# Patient Record
Sex: FEMALE | Race: WHITE | Employment: OTHER | URBAN - METROPOLITAN AREA
[De-identification: names, ages, dates, MRNs, and addresses within clinical notes are randomized per-mention and may not be internally consistent; named-entity substitution may affect disease eponyms.]

---

## 2019-12-17 ENCOUNTER — HOSPITAL ENCOUNTER (OUTPATIENT)
Dept: PHYSICAL THERAPY | Age: 84
Discharge: HOME | End: 2019-12-17
Payer: MEDICARE

## 2019-12-17 PROCEDURE — 97162 PT EVAL MOD COMPLEX 30 MIN: CPT

## 2019-12-17 PROCEDURE — 97530 THERAPEUTIC ACTIVITIES: CPT

## 2019-12-17 NOTE — PROGRESS NOTES
PT DAILY TREATMENT NOTE    Patient Name: Demaris Klinefelter  Date:2019  : 1930  [x]  Patient  Verified  Payor: Mitul Vidales / Plan: [place] MEDICARE [place] & B / Product Type: Medicare /    In time:3:05 pm  Out time:3:55 pm   Total Treatment Time (min): 50  Total Timed Codes (min): 20  1:1 Treatment Time ( [address] only): 50   Visit #: 1 of 16    Treatment Area: Other abnormalities of gait and mobility [R26.89]    SUBJECTIVE  Pain Level (0-10 scale):  2  Any medication changes, allergies to medications, adverse drug reactions, diagnosis change, or new procedure performed?: [x] No    [] Yes (see summary sheet for update)  Subjective functional status/changes:   [] No changes reported    Hx Present Illness: C/C of decreased mobility, right hip pain   S/P Right Tibia fracture -DOI in 2019, per pt's son had ORIF 2 days later  8 weeks of inpatient rehab - SNF in [place]   8 weeks of HHPT and [facility] and in home nursing care  D/C from [facility]   DOI ~8/10/19  Per pt's son - has debilitating arthritis affecting right hip and knee  Per pt and pt's son was walking in home and pt had \"soft fall\"  Lives with son    Prior to fall \"I just started using a cane\"  Denies fear of falling   Since surgery as been walking with FW RW  Since accident has been using  RW in home and WC to exit home   Per pt's son at present is \"sponge bathing\" in 1/2 bath downstairs, unable to get in and out of tub  Per pt's son no assistance for toileting or dressing       Pain:  _7__/10 max       __0_/10 min     _2___/10 now    Location: right hip - lateral hip,buttock    bilateral knees      [] Sharp    [x] Dull      [] Burning     []  Aching     [] Throbbing      [] Tingling     [] Other:       []  Constant                   [x] Intermittent        Previous treatment:   HHPT, SNFx 8 weeks     PMHX: PMHx/Surgical Hx:  please see past medical hx form     Social/Recreation/Work: Work Hx: retited  Living Situation: lives with son, 3 story home, has chair lift, 3 steps on first floor  3 steps to enter/exit   Recreational Activities: gardening, shopping, out to eat      Patient Goal(s): \"get back to where I was before the fall\"    Cognition: A & O x 4      OBJECTIVE    Modalities Rationale:    min [] Estim, type/location:                                      []  att     []  unatt     []  w/US     []  w/ice    []  w/heat    min []  Mechanical Traction: type/lbs                   []  pro   []  sup   []  int   []  cont    []  before manual    []  after manual    min []  Ultrasound, settings/location:      min []  Iontophoresis w/ dexamethasone, location:                                               []  take home patch       []  in clinic    min []  Ice     []  Heat    location/position:     min []  Vasopneumatic Device, press/temp:     min []  Other:    [] Skin assessment post-treatment (if applicable):    []  intact    []  redness- no adverse reaction     []redness - adverse reaction:        30 min [x]Eval                  []Re-Eval             With   [] TE   [x] TA- 20 min   [] neuro   [] other: Patient Education: [x] Review HEP    [] Progressed/Changed HEP based on:   [] positioning   [] body mechanics   [] transfers   [] heat/ice application    [x] other: pt and family education regarding exam findings, fall risk, gait, expectations from PT     Other Objective/Functional Measures:    Movement/gait:  Entered clinic in [facility], with FW RW step to gait pattern    Required UE assistance to transfer Right LE, increased time for sit<>supine     Visual Inspection: substantial edema in bilateral LE Right >Left  Extremely dry skin bilateral LE    Palpation: 2+ pitting edema  Palpable adjustments at all joints at right LE with manual and gait                           AROM/PROM Right Left   Hip IR 15 20   Hip ER 10 22   Hip Flex 83 95                    Strength Right Left   Hip Flex 2- 3   Knee Ext 3 4-   Hamstrings 3 3        DF (gross) 3- 3   PF (gross) 2 2 Special Tests Right Left                                   Other Right Left                                       Other Comments: BP: 104/60 mmHg  5 time sit to stand: able to complete 3 times at 30.3 sec from 20 in with bilateral UE  TU.56 sec with FW RW and CGA with step to gait pattern     Sharp right hip pain with weight bearing     Pain Level (0-10 scale) post treatment: 2    ASSESSMENT/Changes in Function:   Pt is an 80 y.o. female with C/C of decreased mobility and right hip pain. Pt is A&O x4. Per pts son is S/P Right Tibia ORIF following fall in August when was walking inside home with FW RW. Pt lives with son and prior to fall was Mod I with all ADLs. Pt presented to PT in [facility] with 2+ pitting edema in bilateral LE. Objective Findings include decreased LE ROM, palpable adjustments in ankle, knee and hip with weight bearing and ROM, decreased strength,decreasedmobility and increased fall risk as evidenced by 5 time sit to stand and TUG. Patient will continue to benefit from skilled PT services to modify and progress therapeutic interventions, address functional mobility deficits, address ROM deficits, address strength deficits, analyze and address soft tissue restrictions, analyze and cue movement patterns, analyze and modify body mechanics/ergonomics, assess and modify postural abnormalities, address imbalance/dizziness and instruct in home and community integration to attain remaining goals. [x]  See Plan of Care  []  See progress note/recertification  []  See Discharge Summary         Progress towards goals / Updated goals:  Short Term Goals: STG- To be accomplished in 6 treatment(s):  1. Pt will be independent with HEP to encourage prophylaxis. Eval:held due to time    2. Pt will be able to perform 5 sit to stands without rest to indicate increased mobility. Eval: able to complete 3    Long Term Goals: LTG- To be accomplished in 16 treatment(s):  1.   Pt will be able to enter/exit clinic with FW RW and walk with step through gait pattern to indicate improved community intergration. Eval:entered with WC, step to gait pattern     2. Pt will improve 5 time sit to  less than 30 sec Mod I to indicate improved mobility and functional strength. Eval:5 time sit to stand: able to complete 3 times at 30.3 sec from 20 in with bilateral UE    3. Pt will improve TUG to 30 sec with FW RW and mod I to indicate improved mobility.   Eval:TU.56 sec with FW RW and CGA with step to gait pattern       PLAN  [x]  Upgrade activities as tolerated     []  Continue plan of care  []  Update interventions per flow sheet       []  Discharge due to:_  []  Other:_      Chuy Eddy, PT, DPT 2019  2:54 PM    Future Appointments   Date Time Provider [place]   2019  3:00 PM Remigio Lamar, PT, DPT [facility]

## 2019-12-18 NOTE — PROGRESS NOTES
[facility] at the [address], [place], [address] [facility]  Phone: [phone]      Fax:  [phone]       Plan of Care/ Statement of Necessity for Physical Therapy Services      Patient name: Dane Diaz Start of Care: 2019   Referral source: Ibrahima Mata MD : 1930    Medical Diagnosis: Other abnormalities of gait and mobility [R26.89]   Onset Date:DOI 2019    Treatment Diagnosis: Gait Dysfunction    Prior Hospitalization: see medical history Provider#: [id number]   Medications: Verified on Patient summary List    Comorbidities: HTN, Allergies, Arthritis    Prior Level of Function: Prior to fall \"I just started using a cane\", Mod I with all ADLs      The Plan of Care and following information is based on the information from the initial evaluation. Assessment/ key information:   Pt is an 80 y.o. female with C/C of decreased mobility and right hip pain. Pt is A&O x4. Per pts son is S/P Right Tibia ORIF following fall in August when was walking inside home with FW RW. Pt lives with son and prior to fall was Mod I with all ADLs. Pt presented to PT in [facility] with 2+ pitting edema in bilateral LE. Objective Findings include decreased LE ROM, palpable adjustments in ankle, knee and hip with weight bearing and ROM, decreased strength,decreasedmobility and increased fall risk as evidenced by 5 time sit to stand and TUG. Suspect substantial OA changes in right hip and knee contributing to gait and tolerance to mobility. Evaluation Complexity History HIGH Complexity :3+ comorbidities / personal factors will impact the outcome/ POC ; Examination HIGH Complexity : 4+ Standardized tests and measures addressing body structure, function, activity limitation and / or participation in recreation  ;Presentation MEDIUM Complexity : Evolving with changing characteristics  ; Clinical Decision Making MEDIUM Complexity : FOTO score of 26-74  Overall Complexity Rating: MEDIUM  Problem List: pain affecting function, decrease ROM, decrease strength, edema affecting function, impaired gait/ balance, decrease ADL/ functional abilitiies, decrease activity tolerance, decrease flexibility/ joint mobility and decrease transfer abilities   Treatment Plan may include any combination of the following: Therapeutic exercise, Therapeutic activities, Neuromuscular re-education, Physical agent/modality, Gait/balance training, Manual therapy, Patient education, Self Care training, Functional mobility training and Home safety training  Patient / Family readiness to learn indicated by: asking questions, trying to perform skills and interest  Persons(s) to be included in education: patient (P) and family support person (FSP);list pt's son  Barriers to Learning/Limitations: yes;  other premorbid medical condition  Patient Goal (s): get back to where I was before the [address] [place] [address]  Patient Self Reported Health Status: fair  Rehabilitation Potential: fair-good    Short Term Goals: STG- To be accomplished in 6 treatment(s):  1. Pt will be independent with HEP to encourage prophylaxis. Eval:held due to time     2. Pt will be able to perform 5 sit to stands without rest to indicate increased mobility. Eval: able to complete 3     Long Term Goals: LTG- To be accomplished in 16 treatment(s):  1. Pt will be able to enter/exit clinic with FW RW and walk with step through gait pattern to indicate improved community intergration. Eval:entered with WC, step to gait pattern      2. Pt will improve 5 time sit to  less than 30 sec Mod I to indicate improved mobility and functional strength. Eval:5 time sit to stand: able to complete 3 times at 30.3 sec from 20 in with bilateral UE     3. Pt will improve TUG to 30 sec with FW RW and mod I to indicate improved mobility.   Eval:TU.56 sec with FW RW and CGA with step to gait pattern     Frequency / Duration: Patient to be seen 16 treatments. Patient/ Caregiver education and instruction: Diagnosis, prognosis, self care and activity modification   [x]  Plan of care has been reviewed with PTA    Certification Period: 12/17/19 - 3/16/19  Varun Norman PT, DPT 12/17/2019 7:26 PM  _____________________________________________________________________  I certify that the above Therapy Services are being furnished while the patient is under my care. I agree with the treatment plan and certify that this therapy is necessary.     Physician's Signature:____________Date:_________TIME:________    [employer], Date and Time must be completed for valid certification **    Please sign and return to [facility] at the [address], [place], [address] [facility]       Phone: [phone]      Fax:  [phone]

## 2019-12-19 ENCOUNTER — HOSPITAL ENCOUNTER (OUTPATIENT)
Dept: PHYSICAL THERAPY | Age: 84
Discharge: HOME | End: 2019-12-19
Payer: MEDICARE

## 2019-12-19 PROCEDURE — 97110 THERAPEUTIC EXERCISES: CPT

## 2019-12-19 NOTE — PROGRESS NOTES
PT DAILY TREATMENT NOTE    Patient Name: Ebonie Odell  Date:2019  : 1930  [x]  Patient  Verified  Payor: Hu Hind / Plan: [place] MEDICARE PART A & B / Product Type: Medicare /    In time:3:30  Out time:4:25  Total Treatment Time (min): 55  Total Timed Codes (min): 55  1:1 Treatment Time ( [address] only): 55   Visit #: 2 of 16    Treatment Area: Other abnormalities of gait and mobility [R26.89]    SUBJECTIVE  Pain Level (0-10 scale): 5-6  Any medication changes, allergies to medications, adverse drug reactions, diagnosis change, or new procedure performed?: [x] No    [] Yes (see summary sheet for update)  Subjective functional status/changes:   [] No changes reported  \"My knees are stiff. \"    OBJECTIVE      55 min Therapeutic Exercise:  [x] See flow sheet :   Rationale: increase ROM and increase strength to improve the patients ability to perform daily activities with decreased pain and symptom levels          With   [] TE   [] TA   [] neuro   [] other: Patient Education: [x] Review HEP    [] Progressed/Changed HEP based on:   [] positioning   [] body mechanics   [] transfers   [] heat/ice application    [] other:      Other Objective/Functional Measures: Audible hip clunking in standing on right     Pain Level (0-10 scale) post treatment: 7    ASSESSMENT/Changes in Function: Pt tolerated exercises fairly with ability to complete some standing exercises today however challenged with stabilizing on right LE with right lateral lean and increased forward flexion. Updated HEP today to include new exercises with son reporting trying to find compression stockings that fit since the medium size from [facility] was too small. Pt reporting increased right glut soreness post session. SBA for transfers with mod A for LE for supine<>sit.      Patient will continue to benefit from skilled PT services to modify and progress therapeutic interventions, address functional mobility deficits, address strength deficits, analyze and cue movement patterns, analyze and modify body mechanics/ergonomics, assess and modify postural abnormalities, address imbalance/dizziness and instruct in home and community integration to attain remaining goals. []  See Plan of Care  []  See progress note/recertification  []  See Discharge Summary         Progress towards goals / Updated goals:  Short Term Goals: STG- To be accomplished in 6 treatment(s):  1.  Pt will be independent with HEP to encourage prophylaxis. Eval:held due to time  Current: HEP dispesned today      2. Pt will be able to perform 5 sit to stands without rest to indicate increased mobility. Eval: able to complete 3  Current: able to complete 5 from 20 in      Long Term Goals: LTG- To be accomplished in 16 treatment(s):  1.  Pt will be able to enter/exit clinic with FW RW and walk with step through gait pattern to indicate improved community intergration. Eval:entered with WC, step to gait pattern      2.  Pt will improve 5 time sit to  less than 30 sec Mod I to indicate improved mobility and functional strength. Eval:5 time sit to stand: able to complete 3 times at 30.3 sec from 20 in with bilateral UE     3.  Pt will improve TUG to 30 sec with FW RW and mod I to indicate improved mobility.   Eval:TU.56 sec with FW RW and CGA with step to gait pattern        PLAN  [x]  Upgrade activities as tolerated     [x]  Continue plan of care  []  Update interventions per flow sheet       []  Discharge due to:_  []  Other:_      Almita Johnson 2019  3:37 PM    Future Appointments   Date Time Provider [place]   2019 10:15 AM Lynn Ty PT, DPT [facility]   1/3/2020  8:45 AM Janny Overton [facility]   2020  9:30 AM Lynn Ty PT, DPT [facility]   2020 11:30 AM Lynn Ty PT, DPT [facility]   2020 11:15 AM Lynn Ty PT, DPT [facility]   2020 10:45 AM Lynn Ty PT, DPT [facility]   2020 12:00 PM Volodymyr Sumi [facility]   1/17/2020 11:45 AM Curtis Grijalva PT, DPT [facility]   1/21/2020 10:45 AM Curtis Grijalva PT, DPT [facility]   1/23/2020 12:00 PM Winston Overton [facility]   1/24/2020 12:30 PM Curtis Grijalva PT, DPT [facility]   1/28/2020 10:45 AM Curtis Grijalva PT, DPT [facility]   1/30/2020 11:15 AM Curtis Grijalva PT, DPT [facility]   1/31/2020 11:45 AM Curtis Grijalva PT, DPT [facility]

## 2019-12-23 ENCOUNTER — HOSPITAL ENCOUNTER (OUTPATIENT)
Dept: PHYSICAL THERAPY | Age: 84
Discharge: HOME | End: 2019-12-23
Payer: MEDICARE

## 2019-12-23 PROCEDURE — 97110 THERAPEUTIC EXERCISES: CPT

## 2019-12-23 NOTE — PROGRESS NOTES
PT DAILY TREATMENT NOTE    Patient Name: Stuart Varghese  Date:2019  : 1930  [x]  Patient  Verified  Payor: Gerhardt Hinders / Plan: [place] MEDICARE PART A & B / Product Type: Medicare /    In time:10:19 am  Out time:11:00 am  Total Treatment Time (min): 41  Total Timed Codes (min): 41  1:1 Treatment Time ( [address] only): 41   Visit #: 3 of 16    Treatment Area: Other abnormalities of gait and mobility [R26.89]    SUBJECTIVE  Pain Level (0-10 scale): 5  Any medication changes, allergies to medications, adverse drug reactions, diagnosis change, or new procedure performed?: [x] No    [] Yes (see summary sheet for update)  Subjective functional status/changes:   [] No changes reported  \"This right hip hurts me a fair bit. \"  Reports non-compliance with HEP over weekend.     OBJECTIVE    Modalities Rationale:      min [] Estim, type/location:                                      []  att     []  unatt     []  w/US     []  w/ice    []  w/heat    min []  Mechanical Traction: type/lbs                   []  pro   []  sup   []  int   []  cont    []  before manual    []  after manual    min []  Ultrasound, settings/location:      min []  Iontophoresis w/ dexamethasone, location:                                               []  take home patch       []  in clinic    min []  Ice     []  Heat    location/position:     min []  Vasopneumatic Device, press/temp:     min []  Other:    [] Skin assessment post-treatment (if applicable):    []  intact    []  redness- no adverse reaction     []redness - adverse reaction:          41 min Therapeutic Exercise:  [x] See flow sheet :   Rationale: increase ROM, increase strength, improve coordination, improve balance and increase proprioception to improve the patients ability to perform daily activities with decreased pain and symptom levels          With   [] TE   [] TA   [] neuro   [] other: Patient Education: [x] Review HEP    [] Progressed/Changed HEP based on:   [] positioning   [] body mechanics   [] transfers   [] heat/ice application    [] other:      Other Objective/Functional Measures:   Sit to stand from 20 in surface  Utilized manual cues and stabilization for sit to stand to bear weight through right LE and decrease toe out    Audible, palpable re-adjustments at Right LE from hip to ankle with AROM and weight bearing     Pain Level (0-10 scale) post treatment: 7    ASSESSMENT/Changes in Function:   Increased pain with weight bearing and AROM of right LE. Able to decrease with cues to weight bear. Substantial toe out bilaterally. Unable to achieve stance or swing on either LE, increased use of UE with gait. Discussed with pt and pts mother trying to minimize toe out with resting, walking and supine. Patient will continue to benefit from skilled PT services to modify and progress therapeutic interventions, address functional mobility deficits, address ROM deficits, address strength deficits, analyze and address soft tissue restrictions, analyze and cue movement patterns, analyze and modify body mechanics/ergonomics, assess and modify postural abnormalities, address imbalance/dizziness and instruct in home and community integration to attain remaining goals. []  See Plan of Care  []  See progress note/recertification  []  See Discharge Summary         Progress towards goals / Updated goals:  Short Term Goals: STG- To be accomplished in 6 treatment(s):  1.  Pt will be independent with HEP to encourage prophylaxis. Eval:held due to time  Current: Reports non compliance since last appointment      2. Pt will be able to perform 5 sit to stands without rest to indicate increased mobility.    Eval: able to complete 3  Current: Progressing 12/23/19 able to complete 5 from 20 in with increased time       Long Term Goals: LTG- To be accomplished in 16 treatment(s):  1.  Pt will be able to enter/exit clinic with FW RW and walk with step through gait pattern to indicate improved community intergration. Eval:entered with WC, step to gait pattern      2.  Pt will improve 5 time sit to  less than 30 sec Mod I to indicate improved mobility and functional strength. Eval:5 time sit to stand: able to complete 3 times at 30.3 sec from 20 in with bilateral UE     3.  Pt will improve TUG to 30 sec with FW RW and mod I to indicate improved mobility.   Eval:TU.56 sec with FW RW and CGA with step to gait pattern           PLAN  [x]  Upgrade activities as tolerated     []  Continue plan of care  []  Update interventions per flow sheet       []  Discharge due to:_  []  Other:_      Charity Barrios PT, DPT 2019  10:27 AM    Future Appointments   Date Time Provider [place]   1/3/2020  8:45 AM Crista Overton [facility]   2020  9:30 AM Barnet Tang, PT, DPT [facility]   2020 11:30 AM Barnet Tang, PT, DPT [facility]   2020 11:15 AM Barnet Tang, PT, DPT [facility]   2020 10:45 AM Barnet Tang, PT, DPT [facility]   2020 12:00 PM Crista Overton [facility]   2020 11:45 AM Barnet Tang, PT, DPT [facility]   2020 10:45 AM Barnet Tang, PT, DPT [facility]   2020 12:00 PM Crista Overton [facility]   2020 12:30 PM Barmaged Tang, PT, DPT [facility]   2020 10:45 AM Barnet Tang, PT, DPT [facility]   2020 11:15 AM Barnet Tang, PT, DPT [facility]   2020 11:45 AM [facility]

## 2020-01-03 ENCOUNTER — HOSPITAL ENCOUNTER (OUTPATIENT)
Dept: PHYSICAL THERAPY | Age: 85
Discharge: HOME | End: 2020-01-03
Payer: MEDICARE

## 2020-01-03 PROCEDURE — 97110 THERAPEUTIC EXERCISES: CPT

## 2020-01-03 NOTE — PROGRESS NOTES
PT DAILY TREATMENT NOTE    Patient Name: Krys Mackay  Date:1/3/2020  : 1930  [x]  Patient  Verified  Payor: Linder Cheadle / Plan: [place] / Product Type: Medicare /    In time:8:55  Out time:9:55  Total Treatment Time (min): 60  Total Timed Codes (min): 60  1:1 Treatment Time ( [address] only): 45   Visit #: 4 of 16    Treatment Area: Other abnormalities of gait and mobility [R26.89]    SUBJECTIVE  Pain Level (0-10 scale): 5  Any medication changes, allergies to medications, adverse drug reactions, diagnosis change, or new procedure performed?: [x] No    [] Yes (see summary sheet for update)  Subjective functional status/changes:   [] No changes reported  \"My knees and hips are sore this morning. \"    OBJECTIVE      60 min Therapeutic Exercise:  [x] See flow sheet :   Rationale: increase ROM and increase strength to improve the patients ability to perform daily activities with decreased pain and symptom levels    With   [] TE   [] TA   [] neuro   [] other: Patient Education: [x] Review HEP    [] Progressed/Changed HEP based on:   [] positioning   [] body mechanics   [] transfers   [] heat/ice application    [] other:      Other Objective/Functional Measures:   Challenged with putting weight through right LE with standing exercises despite tactile cues     Pain Level (0-10 scale) post treatment: 5    ASSESSMENT/Changes in Function: Pt continues to c/o right LE pain with audible repositioning in standing. Pt very hesistant to place weight through right LE with exercises needing consistent tactile cues from therapist. Added SB rollouts and forward reaching to help with anterior weight shift with sit to stands.      Patient will continue to benefit from skilled PT services to modify and progress therapeutic interventions, address functional mobility deficits, address strength deficits, analyze and cue movement patterns, analyze and modify body mechanics/ergonomics, assess and modify postural abnormalities, address imbalance/dizziness and instruct in home and community integration to attain remaining goals. []  See Plan of Care  []  See progress note/recertification  []  See Discharge Summary         Progress towards goals / Updated goals:  Short Term Goals: STG- To be accomplished in 6 treatment(s):  1.  Pt will be independent with HEP to encourage prophylaxis. Eval:held due to time  Current: Reports non compliance despite education on importance      2. Pt will be able to perform 5 sit to stands without rest to indicate increased mobility. Eval: able to complete 3  Current: Progressing 19 able to complete 5 from 20 in with increased time       Long Term Goals: LTG- To be accomplished in 16 treatment(s):  1.  Pt will be able to enter/exit clinic with FW RW and walk with step through gait pattern to indicate improved community intergration. Eval:entered with WC, step to gait pattern   Current: walked with RW from stepper to parallel bars with cues to lift left LE     2.  Pt will improve 5 time sit to  less than 30 sec Mod I to indicate improved mobility and functional strength. Eval:5 time sit to stand: able to complete 3 times at 30.3 sec from 20 in with bilateral UE  Current:      3.  Pt will improve TUG to 30 sec with FW RW and mod I to indicate improved mobility.   Eval:TU.56 sec with FW RW and CGA with step to gait pattern        PLAN  [x]  Upgrade activities as tolerated     [x]  Continue plan of care  []  Update interventions per flow sheet       []  Discharge due to:_  []  Other:_      Shahab Girard 1/3/2020  8:57 AM    Future Appointments   Date Time Provider Gayle Valencia   2020  9:30 AM Amanda Cruz, PT, DPT [facility]   2020 11:30 AM Amanda Cruz, PT, DPT [facility]   2020 11:15 AM Amanda Cruz, PT, DPT [facility]   2020 10:45 AM Amanda Cruz, PT, DPT [facility]   2020 12:00 PM Rosendo Overton [facility]   2020 11:45 AM Kade Villanueva PT, DPT [facility]   1/21/2020 10:45 AM Kade Villanueva PT, DPT [facility]   1/23/2020 12:00 PM Remesic, Tula Riedel [facility]   1/24/2020 12:30 PM Kade Villanueva PT, DPT [facility]   1/28/2020 10:45 AM Kade Villanueva PT, DPT [facility]   1/30/2020 11:15 AM Kade Villanueva PT, DPT [facility]   1/31/2020 11:45 AM Kade Villanueva PT, DPT [facility]

## 2020-01-06 ENCOUNTER — HOSPITAL ENCOUNTER (OUTPATIENT)
Dept: PHYSICAL THERAPY | Age: 85
Discharge: HOME | End: 2020-01-06
Payer: MEDICARE

## 2020-01-06 PROCEDURE — 97110 THERAPEUTIC EXERCISES: CPT

## 2020-01-06 PROCEDURE — 97530 THERAPEUTIC ACTIVITIES: CPT

## 2020-01-07 ENCOUNTER — HOSPITAL ENCOUNTER (OUTPATIENT)
Dept: PHYSICAL THERAPY | Age: 85
Discharge: HOME | End: 2020-01-07
Payer: MEDICARE

## 2020-01-07 PROCEDURE — 97530 THERAPEUTIC ACTIVITIES: CPT

## 2020-01-07 PROCEDURE — 97110 THERAPEUTIC EXERCISES: CPT

## 2020-01-07 NOTE — PROGRESS NOTES
PT DAILY TREATMENT NOTE    Patient Name: Marisela Mcdaniels  Date:2020  : 1930  [x]  Patient  Verified  Payor: Ronaldo Butler / Plan: [place] MEDICARE PART A & B / Product Type: Medicare /    In time:11:32 am  Out time:12:27 pm   Total Treatment Time (min): 55  Total Timed Codes (min): 55  1:1 Treatment Time ( [address] only): 47   Visit #: 6 of 16    Treatment Area: Other abnormalities of gait and mobility [R26.89]    SUBJECTIVE  Pain Level (0-10 scale): 0  Any medication changes, allergies to medications, adverse drug reactions, diagnosis change, or new procedure performed?: [x] No    [] Yes (see summary sheet for update)  Subjective functional status/changes:   [] No changes reported  \"I am doing ok. \"    OBJECTIVE    Modalities Rationale:       min [] Estim, type/location:                                      []  att     []  unatt     []  w/US     []  w/ice    []  w/heat    min []  Mechanical Traction: type/lbs                   []  pro   []  sup   []  int   []  cont    []  before manual    []  after manual    min []  Ultrasound, settings/location:      min []  Iontophoresis w/ dexamethasone, location:                                               []  take home patch       []  in clinic    min []  Ice     []  Heat    location/position:     min []  Vasopneumatic Device, press/temp:     min []  Other:    [] Skin assessment post-treatment (if applicable):    []  intact    []  redness- no adverse reaction     []redness  adverse reaction:          45 min Therapeutic Exercise:  [x] See flow sheet :   Rationale: increase ROM, increase strength, improve coordination, improve balance and increase proprioception to improve the patients ability to perform daily activities with decreased pain and symptom levels    10 min Therapeutic Activity:  [x]  See flow sheet :   Rationale: increase ROM, increase strength, improve coordination, improve balance and increase proprioception  to improve the patients ability to perform daily activities with decreased pain and symptom levels            With   [] TE   [] TA   [] neuro   [] other: Patient Education: [x] Review HEP    [] Progressed/Changed HEP based on:   [] positioning   [] body mechanics   [] transfers   [] heat/ice application    [] other:      Other Objective/Functional Measures:   Marked right LE valgus    Very challenged with weight bearing through right LE with standing, gait and even through buttock with sitting  Good quad contraction with standing TKE with maximal cues. extreme    Pain Level (0-10 scale) post treatment: 8    ASSESSMENT/Changes in Function:   Continued audible repositioning of right LE from ankle to hip with all activities. Pt very hesistant to place weight through right LE with exercises needing consistent tactile cues from therapist. Increased pain in Right LE following activities. Atone time unable to advance left LE due to pain and lack of weight bearing through right LE. Patient will continue to benefit from skilled PT services to modify and progress therapeutic interventions, address functional mobility deficits, address ROM deficits, address strength deficits, analyze and address soft tissue restrictions, analyze and cue movement patterns, analyze and modify body mechanics/ergonomics, assess and modify postural abnormalities, address imbalance/dizziness and instruct in home and community integration to attain remaining goals. []  See Plan of Care  []  See progress note/recertification  []  See Discharge Summary         Progress towards goals / Updated goals:  Short Term Goals: STG- To be accomplished in 6 treatment(s):  1.  Pt will be independent with HEP to encourage prophylaxis. Eval:held due to time  Current: Pt's son reports focus has been on standing and moving at home     2. Pt will be able to perform 5 sit to stands without rest to indicate increased mobility.    Eval: able to complete 3  Current: Progressing 1/6/20 able to complete 7 during treatment session      Long Term Goals: LTG- To be accomplished in 16 treatment(s):  1.  Pt will be able to enter/exit clinic with FW RW and walk with step through gait pattern to indicate improved community intergration. Eval:entered with WC, step to gait pattern   Current: walked with RW from stepper to parallel bars with cues to lift left LE     2.  Pt will improve 5 time sit to  less than 30 sec Mod I to indicate improved mobility and functional strength. Eval:5 time sit to stand: able to complete 3 times at 30.3 sec from 20 in with bilateral UE  Current: very challenged with sit to stands      3.  Pt will improve TUG to 30 sec with FW RW and mod I to indicate improved mobility.   Eval:TU.56 sec with FW RW and CGA with step to gait pattern   Current: Progressing 20 walking 10-15 yd in clinic with very slow gait pattern     PLAN  [x]  Upgrade activities as tolerated     []  Continue plan of care  []  Update interventions per flow sheet       []  Discharge due to:_  []  Other:_      Yolande Oneal PT, DPT 2020  11:42 AM    Future Appointments   Date Time Provider Gayle Valencia   2020 11:15 AM Najma Patel PT, DPT [facility]   2020 10:45 AM Najma Patel PT, DPT [facility]   2020 12:00 PM Jenise Overton [facility]   2020 11:45 AM Najma Patel PT, DPT [facility]   2020 10:45 AM Najma Patel PT, DPT [facility]   2020 12:00 PM Jenise Overton [facility]   2020 12:30 PM Najma Patel PT, DPT [facility]   2020 10:45 AM Najma Patel PT, DPT [facility]   2020 11:15 AM Naj[facility] Patel PT, [facility]   2020 11:00 AM Jenise Overton [facility]

## 2020-01-09 ENCOUNTER — HOSPITAL ENCOUNTER (OUTPATIENT)
Dept: PHYSICAL THERAPY | Age: 85
Discharge: HOME | End: 2020-01-09
Payer: MEDICARE

## 2020-01-09 PROCEDURE — 97530 THERAPEUTIC ACTIVITIES: CPT

## 2020-01-09 PROCEDURE — 97110 THERAPEUTIC EXERCISES: CPT

## 2020-01-09 NOTE — PROGRESS NOTES
PT DAILY TREATMENT NOTE    Patient Name: Mecca Tucker  Date:2020  : 1930  [x]  Patient  Verified  Payor: Shawnee Barron / Plan: [place] MEDICARE PART A & B / Product Type: Medicare /    In time:11:16 am  Out time:12:15 pm   Total Treatment Time (min): 59  Total Timed Codes (min): 59  1:1 Treatment Time ( [address] only): 45   Visit #: 7 of 16    Treatment Area: Other abnormalities of gait and mobility [R26.89]    SUBJECTIVE  Pain Level (0-10 scale): 0  Any medication changes, allergies to medications, adverse drug reactions, diagnosis change, or new procedure performed?: [x] No    [] Yes (see summary sheet for update)  Subjective functional status/changes:   [] No changes reported  \"I am doing ok. \"  Per pt's son - working on walking at home and pt very reluctant to get up and walk. Pain in right hip with PT    OBJECTIVE    Modalities Rationale:    min [] Estim, type/location:                                      []  att     []  unatt     []  w/US     []  w/ice    []  w/heat    min []  Mechanical Traction: type/lbs                   []  pro   []  sup   []  int   []  cont    []  before manual    []  after manual    min []  Ultrasound, settings/location:      min []  Iontophoresis w/ dexamethasone, location:                                               []  take home patch       []  in clinic    min []  Ice     []  Heat    location/position:     min []  Vasopneumatic Device, press/temp:     min []  Other:    [] Skin assessment post-treatment (if applicable):    []  intact    []  redness- no adverse reaction     []redness  adverse reaction:        45 min Therapeutic Exercise:  [x]? See flow sheet :   Rationale: increase ROM, increase strength, improve coordination, improve balance and increase proprioception to improve the patients ability to perform daily activities with decreased pain and symptom levels     14 min Therapeutic Activity:  [x]?   See flow sheet :   Rationale: increase ROM, increase strength, improve coordination, improve balance and increase proprioception  to improve the patients ability to perform daily activities with decreased pain and symptom levels             With   [] TE   [] TA   [] neuro   [] other: Patient Education: [x] Review HEP    [] Progressed/Changed HEP based on:   [] positioning   [] body mechanics   [] transfers   [] heat/ice application    [] other:      Other Objective/Functional Measures:   Sit to stand from [facility] to allow elevated rail for hands, increased use of UE with standing and sitting but emphasized weight bearing through bilateral LE     Marked Right LE valgus    Very apprehensive with WB through Right LE    Frequent, excessive bilateral LE toe out and ER    Pain Level (0-10 scale) post treatment: 8    ASSESSMENT/Changes in Function:   Pt was able to complete 9 sit to stands from [facility]. While gait is slow pt did demonstrated improved step length and quality with walking with RW. Almost refuses to bear weight through right L, needing consistent tactile cues from therapist. Increased pain in Right LE following activities. . Continued audible repositioning of right LE from ankle to hip with all activities    Patient will continue to benefit from skilled PT services to modify and progress therapeutic interventions, address functional mobility deficits, address ROM deficits, address strength deficits, analyze and address soft tissue restrictions, analyze and cue movement patterns, analyze and modify body mechanics/ergonomics, assess and modify postural abnormalities, address imbalance/dizziness and instruct in home and community integration to attain remaining goals. []  See Plan of Care  []  See progress note/recertification  []  See Discharge Summary         Progress towards goals / Updated goals:  Short Term Goals: STG- To be accomplished in 6 treatment(s):  1.  Pt will be independent with HEP to encourage prophylaxis.   Eval:held due to time  Current: Pt's son reports focus has been on standing and moving at home     2. Pt will be able to perform 5 sit to stands without rest to indicate increased mobility. Eval: able to complete 3  Current: Progressing 20 able to complete 9  This session, 5 slowly without prolonged sitting rest     Long Term Goals: LTG- To be accomplished in 16 treatment(s):  1.  Pt will be able to enter/exit clinic with FW RW and walk with step through gait pattern to indicate improved community intergration. Eval:entered with WC, step to gait pattern   Current: walked with RW from stepper to parallel bars with cues to lift left LE     2.  Pt will improve 5 time sit to  less than 30 sec Mod I to indicate improved mobility and functional strength. Eval:5 time sit to stand: able to complete 3 times at 30.3 sec from 20 in with bilateral UE  Current: very challenged with sit to stands      3.  Pt will improve TUG to 30 sec with FW RW and mod I to indicate improved mobility.   Eval:TU.56 sec with FW RW and CGA with step to gait pattern   Current: Progressing 20 walking 10-15 yd in clinic with very slow gait pattern     PLAN  [x]  Upgrade activities as tolerated     []  Continue plan of care  []  Update interventions per flow sheet       []  Discharge due to:_  []  Other:_      Ha Bush PT, DPT 2020  11:21 AM    Future Appointments   Date Time Provider Gayle Valencia   2020 10:45 AM Antonio oWng PT, DPT [facility]   2020 12:00 PM Kike Overton [facility]   2020 11:45 AM Antonio Wong PT, DPT [facility]   2020 10:45 AM Antonio Wong PT DPT [facility]   2020 12:00 PM Kike Overton [facility]   2020 12:30 PM Antonio Wong PT, DPT [facility]   2020 10:45 AM Antonio Wong PT, DPT [facility]   2020 11:15 AM Antonio Wong PT, JUSTINT [facility]   2020 11:00 AM Kike OvertonHPTBMELANY [facility]

## 2020-01-14 ENCOUNTER — HOSPITAL ENCOUNTER (OUTPATIENT)
Dept: PHYSICAL THERAPY | Age: 85
Discharge: HOME | End: 2020-01-14
Payer: MEDICARE

## 2020-01-14 PROCEDURE — 97530 THERAPEUTIC ACTIVITIES: CPT

## 2020-01-14 PROCEDURE — 97110 THERAPEUTIC EXERCISES: CPT

## 2020-01-14 NOTE — PROGRESS NOTES
[facility] [address] [facility], [place], [address] [facility]  Phone: [phone]      Fax:  [phone]    Progress Note  Patient name: Iliana Riggs Start of Care: 2019   Referral source: Bianca Henriquez MD : 1930               Medical Diagnosis: Other abnormalities of gait and mobility [R26.89]    Onset Date:               Treatment Diagnosis: Gait Dysfunction    Prior Hospitalization: see medical history Provider#: [id number]   Medications: Verified on Patient summary List    Comorbidities: HTN, Allergies, Arthritis    Prior Level of Function: Prior to fall \"I just started using a cane\", Mod I with all ADLs    Visits from Start of Care: 8    Missed Visits: 0    Progress Towards Goals:   Short Term Goals: STG- To be accomplished in 6 treatment(s):  1.  Pt will be independent with HEP to encourage prophylaxis. Eval:held due to time  Current: Pt's son reports focus has been on standing and moving at home     2. Pt will be able to perform 5 sit to stands without rest to indicate increased mobility. Eval: able to complete 3  Current: Progressing 20 able to complete 10 this session, Able to perform 5 sit to stands without rest from  [address]- To be accomplished in 16 treatment(s):  1.  Pt will be able to enter/exit clinic with FW RW and walk with step through gait pattern to indicate improved community intergration. Eval:entered with WC, step to gait pattern   Current: walked with RW from stepper to parallel bars with cues to lift left LE     2.  Pt will improve 5 time sit to  less than 30 sec Mod I to indicate improved mobility and functional strength.   Eval:5 time sit to stand: able to complete 3 times at 30.3 sec from 20 in with bilateral UE  Current: Progressing 20 Able to perform 5 sit to stands without rest from [facility]     3.  Pt will improve TUG to 30 sec with FW RW and mod I to indicate improved mobility. Eval:TU.56 sec with FW RW and CGA with step to gait pattern   Current: Progressing TUG: trial 1: 104 sec with RW, SBA and step through gait, took 20 sec to stand  Trial 2: 96 sec with RW and SBA and step through, took 19 sec to stand    Key Functional Changes:   Able to perform 5 sit to stands without rest from [facility]  TUG: trial 1: 104 sec with RW, SBA and step through gait, took 20 sec to stand  Trial 2: 96 sec with RW and SBA and step through, took 19 sec to stand    Pt has been seen for 8 visits including initial evaluation C/C of decreased mobility and right hip pain. Pt is A&O x4. Per pts son is S/P Right Tibia ORIF following fall in August when was walking inside home with FW RW.  Pt is very limited in participation in PT and tolerance to activities to to constant and painful realignment of right LE from hip to ankle with AROM of right LE and weight bearing. Painful at right hip with walking >2-3 steps. Increased time with transfers and increased use of L-spine. Right LE rests in ER and knee flexion. Apprehensive with weight bearing through right LE.    Please advise as to POC with Right hip - question if femoral head is positioned in acetabulum at rest.     Updated Goals: to be achieved in 8 treatments:   Same as above     ASSESSMENT/RECOMMENDATIONS:  [x]Continue therapy per initial plan/protocol at a frequency of  8 treatments  []Continue therapy with the following recommended changes:_____________________      _____________________________________________________________________  []Discontinue therapy progressing towards or have reached established goals  []Discontinue therapy due to lack of appreciable progress towards goals  []Discontinue therapy due to lack of attendance or compliance  []Await Physician's recommendations/decisions regarding therapy  []Other:________________________________________________________________    Thank you for this referral.   Francheska Adams PT, JUSTINT 1/14/2020 2:08 PM  NOTE TO PHYSICIAN:  PLEASE COMPLETE THE ORDERS BELOW AND   FAX TO [facility]: [phone]  If you are unable to process this request in 24 hours please contact our office: [phone]        []  I have read the above report and request that my patient continue as recommended. []  I have read the above report and request that my patient continue therapy with the following changes/special instructions:________________________________________  []I have read the above report and request that my patient be discharged from therapy.

## 2020-01-14 NOTE — PROGRESS NOTES
PT DAILY TREATMENT NOTE    Patient Name: Jazmin Mccollum  Date:2020  : 1930  [x]  Patient  Verified  Payor: Ana Soliman / Plan: [place] MED[place] & B / Product Type: Medicare /    In time:10:49 am  Out time:11:45 am  Total Treatment Time (min): 56  Total Timed Codes (min): 56  1:1 Treatment Time ( [address] only): 46   Visit #: 8 of 16    Treatment Area: Other abnormalities of gait and mobility [R26.89]    SUBJECTIVE  Pain Level (0-10 scale): 0  Any medication changes, allergies to medications, adverse drug reactions, diagnosis change, or new procedure performed?: [x] No    [] Yes (see summary sheet for update)  Subjective functional status/changes:   [] No changes reported  \"I am ok\"  Pt's son reports that has MD appointment at 1:10 today. This weekend pt was very reluctant to walk. OBJECTIVE    Modalities Rationale:      min [] Estim, type/location:                                      []  att     []  unatt     []  w/US     []  w/ice    []  w/heat    min []  Mechanical Traction: type/lbs                   []  pro   []  sup   []  int   []  cont    []  before manual    []  after manual    min []  Ultrasound, settings/location:      min []  Iontophoresis w/ dexamethasone, location:                                               []  take home patch       []  in clinic    min []  Ice     []  Heat    location/position:     min []  Vasopneumatic Device, press/temp:     min []  Other:    [] Skin assessment post-treatment (if applicable):    []  intact    []  redness- no adverse reaction     []redness  adverse reaction:        26 min Therapeutic Exercise:  [x]? ? See flow sheet :   Rationale: increase ROM, increase strength, improve coordination, improve balance and increase proprioception to improve the patients ability to perform daily activities with decreased pain and symptom levels     31 min Therapeutic Activity:  [x]? ?  See flow sheet :   Rationale: increase ROM, increase strength, improve coordination, improve balance and increase proprioception  to improve the patients ability to perform daily activities with decreased pain and symptom levels          With   [] TE   [] TA   [] neuro   [] other: Patient Education: [x] Review HEP    [] Progressed/Changed HEP based on:   [] positioning   [] body mechanics   [] transfers   [] heat/ice application    [] other:      Other Objective/Functional Measures:   Able to perform 5 sit to stands without rest from [facility]  TUG: trial 1: 104 sec with RW, SBA and step through gait, took 20 sec to stand  Trial 2: 96 sec with RW and SBA and step through, took 19 sec to stand    Pain Level (0-10 scale) post treatment: 8    ASSESSMENT/Changes in Function:   Pt has been seen for 8 visits including initial evaluation C/C of decreased mobility and right hip pain. Pt is A&O x4. Per pts son is S/P Right Tibia ORIF following fall in August when was walking inside home with FW RW.  Pt is very limited in participation in PT and tolerance to activities to to constant and painful realignment of right LE from hip to ankle with AROM of right LE and weight bearing. Painful at right hip with walking >2-3 steps. Increased time with transfers and increased use of L-spine. Right LE rests in ER and knee flexion. Apprehensive with weight bearing through right LE. Please advise as to POC with Right hip - question if femoral head is positioned in acetabulum at rest.     Patient will continue to benefit from skilled PT services to modify and progress therapeutic interventions, address functional mobility deficits, address ROM deficits, address strength deficits, analyze and address soft tissue restrictions, analyze and cue movement patterns, analyze and modify body mechanics/ergonomics, assess and modify postural abnormalities, address imbalance/dizziness and instruct in home and community integration to attain remaining goals.      []  See Plan of Care  [x]  See progress note/recertification  [] See Discharge Summary         Progress towards goals / Updated goals:  Short Term Goals: STG- To be accomplished in 6 treatment(s):  1.  Pt will be independent with HEP to encourage prophylaxis. Eval:held due to time  Current: Pt's son reports focus has been on standing and moving at home     2. Pt will be able to perform 5 sit to stands without rest to indicate increased mobility. Eval: able to complete 3  Current: Progressing 20 able to complete 10 this session, Able to perform 5 sit to stands without rest from  [address]- To be accomplished in 16 treatment(s):  1.  Pt will be able to enter/exit clinic with FW RW and walk with step through gait pattern to indicate improved community intergration. Eval:entered with WC, step to gait pattern   Current: walked with RW from stepper to parallel bars with cues to lift left LE     2.  Pt will improve 5 time sit to  less than 30 sec Mod I to indicate improved mobility and functional strength. Eval:5 time sit to stand: able to complete 3 times at 30.3 sec from 20 in with bilateral UE  Current: Progressing 20 Able to perform 5 sit to stands without rest from [facility]     3.  Pt will improve TUG to 30 sec with FW RW and mod I to indicate improved mobility.   Eval:TU.56 sec with FW RW and CGA with step to gait pattern   Current: Progressing TUG: trial 1: 104 sec with RW, SBA and step through gait, took 20 sec to stand  Trial 2: 96 sec with RW and SBA and step through, took 19 sec to stand    PLAN  [x]  Upgrade activities as tolerated     []  Continue plan of care  []  Update interventions per flow sheet       []  Discharge due to:_  []  Other:_      Ronnie Cross, PT, DPT 2020  11:23 AM    Future Appointments   Date Time Provider Gayle Valencia   2020 12:00 PM Etelvina Overton Manual [facility]   2020 11:45 AM Derek Olguin PT, DPT [facility]   2020 10:45 AM Derek Olguin PT, DPT [facility]   2020 12:00 PM Nadine Davila [facility]   1/24/2020 12:30 PM Lillian Allen PT, DPT [facility]   1/28/2020 10:45 AM Lillian Allen PT, DPT [facility]   1/30/2020 11:15 AM Lillian Allen PT, DPT [facility]   1/31/2020 11:00 AM Carmina Overton [facility]

## 2020-01-16 ENCOUNTER — HOSPITAL ENCOUNTER (OUTPATIENT)
Dept: PHYSICAL THERAPY | Age: 85
Discharge: HOME | End: 2020-01-16
Payer: MEDICARE

## 2020-01-16 PROCEDURE — 97110 THERAPEUTIC EXERCISES: CPT

## 2020-01-16 PROCEDURE — 97530 THERAPEUTIC ACTIVITIES: CPT

## 2020-01-16 NOTE — PROGRESS NOTES
PT DAILY TREATMENT NOTE    Patient Name: Reyes Cliche  Date:2020  : 1930  [x]  Patient  Verified  Payor: Gabi Avendaño / Plan: [place] MEDICARE PART A & B / Product Type: Medicare /    In time:12:02  Out time:12:55  Total Treatment Time (min): 53  Total Timed Codes (min): 53  1:1 Treatment Time ( [address] only): 45   Visit #: 9 of 16    Treatment Area: Other abnormalities of gait and mobility [R26.89]    SUBJECTIVE  Pain Level (0-10 scale): 4  Any medication changes, allergies to medications, adverse drug reactions, diagnosis change, or new procedure performed?: [x] No    [] Yes (see summary sheet for update)  Subjective functional status/changes:   [] No changes reported  Son reporting pt starting on steroid dose pack 3 days in now with getting steroid injection next Thursday in right hip. OBJECTIVE      38 min Therapeutic Exercise:  [x] See flow sheet :   Rationale: increase ROM and increase strength to improve the patients ability to perform daily activities with decreased pain and symptom levels    15 min Therapeutic Activity:  [x]  See flow sheet :   Rationale: improve coordination, improve balance and increase proprioception  to improve the patients ability to perform daily activities with decreased pain and symptom levels     With   [] TE   [] TA   [] neuro   [] other: Patient Education: [x] Review HEP    [] Progressed/Changed HEP based on:   [] positioning   [] body mechanics   [] transfers   [] heat/ice application    [] other:      Other Objective/Functional Measures:   Improved form with sit to stands with cues to look forward      Pain Level (0-10 scale) post treatment: 7    ASSESSMENT/Changes in Function: Pt continues to be challenged with weight shifting onto right LE with standing activities however able to step forward and back with left LE with RW today without dragging it. Improved form with sit to stands with cues to look forward. Less realignment of right LE from hip to ankle today. Patient will continue to benefit from skilled PT services to modify and progress therapeutic interventions, address functional mobility deficits, address strength deficits, analyze and cue movement patterns, analyze and modify body mechanics/ergonomics, assess and modify postural abnormalities, address imbalance/dizziness and instruct in home and community integration to attain remaining goals. []  See Plan of Care  []  See progress note/recertification  []  See Discharge Summary         Progress towards goals / Updated goals:  Short Term Goals: STG- To be accomplished in 6 treatment(s):  1.  Pt will be independent with HEP to encourage prophylaxis. Eval:held due to time  Last PN: Pt's son reports focus has been on standing and moving at home  Current;      2. Pt will be able to perform 5 sit to stands without rest to indicate increased mobility. Eval: able to complete 3  Last PN: able to complete 10 this session, Able to perform 5 sit to stands without rest from [facility]  Current:      Long Term Goals: LTG- To be accomplished in 16 treatment(s):  1.  Pt will be able to enter/exit clinic with FW RW and walk with step through gait pattern to indicate improved community intergration. Eval:entered with WC, step to gait pattern   Last PN: walked with RW from stepper to parallel bars with cues to lift left LE  Current: improved dania with RW today wth walking from stepper to chair outside of parallel bars progressing 20     2.  Pt will improve 5 time sit to  less than 30 sec Mod I to indicate improved mobility and functional strength. Eval:5 time sit to stand: able to complete 3 times at 30.3 sec from 20 in with bilateral UE  Last PN:  Able to perform 5 sit to stands without rest from [facility]  Current:      3.  Pt will improve TUG to 30 sec with FW RW and mod I to indicate improved mobility.   Eval:TU.56 sec with FW RW and CGA with step to gait pattern   Last PN: TUG: trial 1: 104 sec with RW, SBA and step through gait, took 20 sec to stand  Trial 2: 96 sec with RW and SBA and step through, took 19 sec to stand  Current:        PLAN  [x]  Upgrade activities as tolerated     [x]  Continue plan of care  []  Update interventions per flow sheet       []  Discharge due to:_  []  Other:_      Ty Overton 1/16/2020  2:03 PM    Future Appointments   Date Time Provider Gayle Valencia   1/17/2020 11:45 AM Gita Vogel PT, DPT [facility]   1/21/2020 10:45 AM Gita Vogel PT, DPT [facility]   1/23/2020 12:00 PM Ty Ovreton [facility] [place] [facility]   1/24/2020 12:30 PM Gita Vogel PT, DPT [facility]   1/28/2020 10:45 AM Gita Vogel PT, DPT [facility]   1/30/2020 11:15 AM Gita Vogel PT, DPT [facility]   1/31/2020 11:00 AM Ty Overton [facility]

## 2020-01-17 ENCOUNTER — HOSPITAL ENCOUNTER (OUTPATIENT)
Dept: PHYSICAL THERAPY | Age: 85
Discharge: HOME | End: 2020-01-17
Payer: MEDICARE

## 2020-01-17 PROCEDURE — 97110 THERAPEUTIC EXERCISES: CPT

## 2020-01-17 PROCEDURE — 97530 THERAPEUTIC ACTIVITIES: CPT

## 2020-01-17 NOTE — PROGRESS NOTES
PT DAILY TREATMENT NOTE    Patient Name: Jazmin Mccollum  PESB:  : 1930  [x]  Patient  Verified  Payor: Ana Soliman / Plan: [place] MEDICARE PART A & B / Product Type: Medicare /    In time:11:45 am   Out time:12:42 pm   Total Treatment Time (min): 57  Total Timed Codes (min): 57  1:1 Treatment Time ( [address] only): 47   Visit #: 10 of 16    Treatment Area: Other abnormalities of gait and mobility [R26.89]    SUBJECTIVE  Pain Level (0-10 scale): 0  Any medication changes, allergies to medications, adverse drug reactions, diagnosis change, or new procedure performed?: [x] No    [] Yes (see summary sheet for update)  Subjective functional status/changes:   [] No changes reported  Per pt's son Red Crawford is cranky today. \"    OBJECTIVE    Modalities Rationale:   min [] Estim, type/location:                                      []  att     []  unatt     []  w/US     []  w/ice    []  w/heat    min []  Mechanical Traction: type/lbs                   []  pro   []  sup   []  int   []  cont    []  before manual    []  after manual    min []  Ultrasound, settings/location:      min []  Iontophoresis w/ dexamethasone, location:                                               []  take home patch       []  in clinic    min []  Ice     []  Heat    location/position:     min []  Vasopneumatic Device, press/temp:     min []  Other:    [] Skin assessment post-treatment (if applicable):    []  intact    []  redness- no adverse reaction     []redness  adverse reaction:          40 min Therapeutic Exercise:  [x] See flow sheet :   Rationale: increase ROM, increase strength, improve coordination, improve balance and increase proprioception to improve the patients ability to perform daily activities with decreased pain and symptom levels      17 min Therapeutic Activity:  [x]  See flow sheet :   Rationale: increase ROM, increase strength, improve coordination, improve balance and increase proprioception  to improve the patients ability to perform daily activities with decreased pain and symptom levels            With   [] TE   [] TA   [] neuro   [] other: Patient Education: [x] Review HEP    [] Progressed/Changed HEP based on:   [] positioning   [] body mechanics   [] transfers   [] heat/ice application    [] other:      Other Objective/Functional Measures:   Sit to stand from WC 21 in surface - increased use of UE but did respond to cues to look forward   16 yd with RW and CGA  Excessive toe out bilaterally     Pain Level (0-10 scale) post treatment: 7    ASSESSMENT/Changes in Function:   Pt reported feeling gluts with seated PNFs and standing reach. Very reluctant to bear weight through right LE. Increased LE readjustments compared to yesterday with standing. Pt reported muscle soreness. Patient will continue to benefit from skilled PT services to modify and progress therapeutic interventions, address functional mobility deficits, address ROM deficits, address strength deficits, analyze and address soft tissue restrictions, analyze and cue movement patterns, analyze and modify body mechanics/ergonomics, assess and modify postural abnormalities, address imbalance/dizziness and instruct in home and community integration to attain remaining goals. []  See Plan of Care  []  See progress note/recertification  []  See Discharge Summary         Progress towards goals / Updated goals:  Short Term Goals: STG- To be accomplished in 6 treatment(s):  1.  Pt will be independent with HEP to encourage prophylaxis. Eval:held due to time  Last PN: Pt's son reports focus has been on standing and moving at home  Current;      2. Pt will be able to perform 5 sit to stands without rest to indicate increased mobility.    Eval: able to complete 3  Last PN: able to complete 10 this session, Able to perform 5 sit to stands without rest from [facility]  Current:      Long Term Goals: LTG- To be accomplished in 16 treatment(s):  1.  Pt will be able to enter/exit clinic with FW RW and walk with step through gait pattern to indicate improved community intergration. Eval:entered with WC, step to gait pattern   Last PN: walked with RW from stepper to parallel bars with cues to lift left LE  Current: improved dania with RW today wth walking from stepper to chair outside of parallel bars progressing 20     2.  Pt will improve 5 time sit to  less than 30 sec Mod I to indicate improved mobility and functional strength. Eval:5 time sit to stand: able to complete 3 times at 30.3 sec from 20 in with bilateral UE  Last PN:  [facility] perform [facility] sit [facility]  Current:      3.  Pt will improve TUG to 30 sec with FW RW and mod I to indicate improved mobility.   Eval:TU.56 sec with FW RW and CGA with step to gait pattern   Last PN: TUG: trial 1: 104 sec with RW, SBA and step through gait, took 20 sec to stand  Trial 2: 96 sec with RW and SBA and step through, took 19 sec to stand  Current: Pt walked 17 yds in clinic with improved step through - progressing     PLAN  [x]  Upgrade activities as tolerated     []  Continue plan of care  []  Update interventions per flow sheet       []  Discharge due to:_  []  Other:_      Jenn Marie PT, DPT 2020  12:19 PM    Future Appointments   Date Time Provider Gayle Valencia   2020 10:45 AM Dannielle Cooks, PT, DPT [facility]   2020 12:00 PM Rayshawn Overton [facility]   2020 12:30 PM Dannielle Cooks, PT, DPT [facility] [place] [facility]   2020 10:45 AM Dannielle Cooks, PT, DPT [facility] [place] [facility]   2020 11:15 AM [facility]ann[facility]   2020 11:00 AM Rayshawn Overton [facility]

## 2020-01-21 ENCOUNTER — HOSPITAL ENCOUNTER (OUTPATIENT)
Dept: PHYSICAL THERAPY | Age: 85
Discharge: HOME | End: 2020-01-21
Payer: MEDICARE

## 2020-01-21 PROCEDURE — 97530 THERAPEUTIC ACTIVITIES: CPT

## 2020-01-21 PROCEDURE — 97110 THERAPEUTIC EXERCISES: CPT

## 2020-01-21 NOTE — PROGRESS NOTES
PT DAILY TREATMENT NOTE    Patient Name: Joanne Cid  Date:2020  : 1930  [x]  Patient  Verified  Payor: Ewelina Breath / Plan: [place] MEDICARE PART A & B / Product Type: Medicare /    In time: 10:47 am  Out time:11:46 am   Total Treatment Time (min): 59  Total Timed Codes (min): 59  1:1 Treatment Time ( [address] only): 59   Visit #: 11 of 16    Treatment Area: Other abnormalities of gait and mobility [R26.89]    SUBJECTIVE  Pain Level (0-10 scale): 4  Any medication changes, allergies to medications, adverse drug reactions, diagnosis change, or new procedure performed?: [x] No    [] Yes (see summary sheet for update)  Subjective functional status/changes:   [] No changes reported  \"I am here. Earl still a little sore (indicated buttocks)\"    OBJECTIVE    Modalities Rationale:    min [] Estim, type/location:                                      []  att     []  unatt     []  w/US     []  w/ice    []  w/heat    min []  Mechanical Traction: type/lbs                   []  pro   []  sup   []  int   []  cont    []  before manual    []  after manual    min []  Ultrasound, settings/location:      min []  Iontophoresis w/ dexamethasone, location:                                               []  take home patch       []  in clinic    min []  Ice     []  Heat    location/position:     min []  Vasopneumatic Device, press/temp:     min []  Other:    [] Skin assessment post-treatment (if applicable):    []  intact    []  redness- no adverse reaction     []redness  adverse reaction:        44 min Therapeutic Exercise:  [x]? See flow sheet :   Rationale: increase ROM, increase strength, improve coordination, improve balance and increase proprioception to improve the patients ability to perform daily activities with decreased pain and symptom levels        15 min Therapeutic Activity:  [x]?   See flow sheet :   Rationale: increase ROM, increase strength, improve coordination, improve balance and increase proprioception  to improve the patients ability to perform daily activities with decreased pain and symptom levels          With   [] TE   [] TA   [] neuro   [] other: Patient Education: [x] Review HEP    [] Progressed/Changed HEP based on:   [] positioning   [] body mechanics   [] transfers   [] heat/ice application    [] other:      Other Objective/Functional Measures:   Walked 20 yds with RW and CGA,increased WB through UE on walker during right stance   Sit to stand from [facility] - increased forward flexion, decreased anterior weight shift, increased UE use     Pain Level (0-10 scale) post treatment: 8    ASSESSMENT/Changes in Function:   Pt reported increased soreness through weekend - but more muscular. Noted overal decreased Right LE adjustments with steroid dose pack. Demonstrated increased motivation in last 2 sessions. Challenged with seated ball lifts. Patient will continue to benefit from skilled PT services to modify and progress therapeutic interventions, address functional mobility deficits, address ROM deficits, address strength deficits, analyze and address soft tissue restrictions, analyze and cue movement patterns, analyze and modify body mechanics/ergonomics, assess and modify postural abnormalities, address imbalance/dizziness and instruct in home and community integration to attain remaining goals. []  See Plan of Care  []  See progress note/recertification  []  See Discharge Summary         Progress towards goals / Updated goals:  Short Term Goals: STG- To be accomplished in 6 treatment(s):  1.  Pt will be independent with HEP to encourage prophylaxis. Eval:held due to time  Last PN: Pt's son reports focus has been on standing and moving at home  Current;      2. Pt will be able to perform 5 sit to stands without rest to indicate increased mobility.    Eval: able to complete 3  Last PN: able to complete 10 this session, Able to perform 5 sit to stands without rest from [facility]  Current: MET 1/21/20 performed 5 without rest break.     Long Term Goals: LTG- To be accomplished in 16 treatment(s):  1.  Pt will be able to enter/exit clinic with FW RW and walk with step through gait pattern to indicate improved community intergration. Eval:entered with WC, step to gait pattern   Last PN: walked with RW from stepper to parallel bars with cues to lift left LE  Current: improved dania with RW today wth walking from stepper to chair outside of parallel bars progressing 20     2.  Pt will improve 5 time sit to  less than 30 sec Mod I to indicate improved mobility and functional strength. Eval:5 time sit to stand: able to complete 3 times at 30.3 sec from 20 in with bilateral UE  Last PN:  Able to perform 5 sit to stands without rest from [facility]  Current:      3.  Pt will improve TUG to 30 sec with FW RW and mod I to indicate improved mobility.   Eval:TU.56 sec with FW RW and CGA with step to gait pattern   Last PN: TUG: trial 1: 104 sec with RW, SBA and step through gait, took 20 sec to stand  Trial 2: 96 sec with RW and SBA and step through, took 19 sec to stand  Current: Pt walked 20 yds in clinic with improved step through - progressing 20    PLAN  [x]  Upgrade activities as tolerated     []  Continue plan of care  []  Update interventions per flow sheet       []  Discharge due to:_  []  Other:_      Ronnie Greene PT, DPT 2020  10:53 AM    Future Appointments   Date Time Provider Gayle Valencia   2020 12:00 PM Remesic, Tula Riedel [facility]   2020 12:30 PM Kade Villanueva PT, [facility]   2020 10:45 AM Kade Villanueva PT, DPIGOR [facility] [place] [facility]   2020 11:15 AM Kade Villanueva PT, DPT [facility]   2020 11:00 AM Remesic, Tula Riedel [facility]

## 2020-01-23 ENCOUNTER — HOSPITAL ENCOUNTER (OUTPATIENT)
Dept: PHYSICAL THERAPY | Age: 85
Discharge: HOME | End: 2020-01-23
Payer: MEDICARE

## 2020-01-23 PROCEDURE — 97530 THERAPEUTIC ACTIVITIES: CPT

## 2020-01-23 PROCEDURE — 97110 THERAPEUTIC EXERCISES: CPT

## 2020-01-23 NOTE — PROGRESS NOTES
PT DAILY TREATMENT NOTE    Patient Name: Yang Hernández  Date:2020  : 1930  [x]  Patient  Verified  Payor: Ashlyn Conn / Plan: [place] / Product Type: Medicare /    In time:12:00 Out time: 1:17  Total Treatment Time (min): 77 (15min break for rest room)   Total Timed Codes (min): 62  1:1 Treatment Time ( [address] only): 40  Visit #: 12 of 16    Treatment Area: Other abnormalities of gait and mobility [R26.89]    SUBJECTIVE  Pain Level (0-10 scale): 4  Any medication changes, allergies to medications, adverse drug reactions, diagnosis change, or new procedure performed?: [x] No    [] Yes (see summary sheet for update)  Subjective functional status/changes:   [] No changes reported  \"My hip hurts. \"    OBJECTIVE    42 min Therapeutic Exercise:  [x] See flow sheet :   Rationale: increase ROM and increase strength to improve the patients ability to perform daily activities with decreased pain and symptom levels    20 min Therapeutic Activity:  []  See flow sheet :   Rationale: improve coordination, improve balance and increase proprioception  to improve the patients ability to perform daily activities with decreased pain and symptom levels     With   [] TE   [] TA   [] neuro   [] other: Patient Education: [x] Review HEP    [] Progressed/Changed HEP based on:   [] positioning   [] body mechanics   [] transfers   [] heat/ice application    [] other:      Other Objective/Functional Measures:   TUG scores below   Very fatigued with sit to stands with step under left foot     Pain Level (0-10 scale) post treatment: 6-7    ASSESSMENT/Changes in Function: Pt very fatigued with sit to stand with step under left to emphasize weight shift onto right LE. Less \"clunking\" today with exercises. TUG score did improve today on second trial however increased time for standing and turning.     Patient will continue to benefit from skilled PT services to modify and progress therapeutic interventions, address functional mobility deficits, address strength deficits, analyze and cue movement patterns, analyze and modify body mechanics/ergonomics, assess and modify postural abnormalities, address imbalance/dizziness and instruct in home and community integration to attain remaining goals. []  See Plan of Care  []  See progress note/recertification  []  See Discharge Summary         Progress towards goals / Updated goals:  Short Term Goals: STG- To be accomplished in 6 treatment(s):  1.  Pt will be independent with HEP to encourage prophylaxis. Eval:held due to time  Last PN: Pt's son reports focus has been on standing and moving at home  Current;      2. Pt will be able to perform 5 sit to stands without rest to indicate increased mobility. Eval: able to complete 3  Last PN: able to complete 10 this session, Able to perform 5 sit to stands without rest from [facility]  Current: MET 20 performed 5 without rest break.     Long Term Goals: LTG- To be accomplished in 16 treatment(s):  1.  Pt will be able to enter/exit clinic with FW RW and walk with step through gait pattern to indicate improved community intergration. Eval:entered with WC, step to gait pattern   Last PN: walked with RW from stepper to parallel bars with cues to lift left LE  Current: improved dania with RW today wth walking from stepper to chair outside of parallel bars progressing 20     2.  Pt will improve 5 time sit to  less than 30 sec Mod I to indicate improved mobility and functional strength. Eval:5 time sit to stand: able to complete 3 times at 30.3 sec from 20 in with bilateral UE  Last PN:  Able to perform 5 sit to stands without rest from [facility]  Current:      3.  Pt will improve TUG to 30 sec with FW RW and mod I to indicate improved mobility.   Eval:TU.56 sec with FW RW and CGA with step to gait pattern   Last PN: TUG: trial 1: 104 sec with RW, SBA and step through gait, took 20 sec to stand  Trial 2: 96 sec with RW and SBA and step through, took 19 sec to stand  Current: 4 min 3 seconds first  Trial, 2 min 25 sec second trials, 15 seconds sit to stand with increased time with turns progressing 1/23/20    PLAN  [x]  Upgrade activities as tolerated     [x]  Continue plan of care  []  Update interventions per flow sheet       []  Discharge due to:_  []  Other:_      William Tariq 1/23/2020  11:49 AM    Future Appointments   Date Time Provider Gayle Valencia   1/23/2020 12:00 PM Remesic, Elinor Media [facility]   1/24/2020 12:30 PM Dorthey Lints, PT [facility]   1/28/2020 10:45 AM Harpal Raja, PT, DPT [facility]   1/30/2020 11:15 AM Harpal Raja, PT, DPT [facility]   1/31/2020 11:00 AM Remesic, [place] Media [facility]   2/3/2020 12:45 PM Dorthey Lints, PT [facility]   2/6/2020 10:30 AM Dorthey Lints, PT [facility]   2/7/2020 11:00 AM Dorthey Lints, PT [facility]   2/11/2020  3:45 PM Remesic, [place] Media [facility]   2/12/2020 12:15 PM Harpal Raja, PT, DPT [facility]   2/14/2020  1:15 PM Harpal Raja, PT, DPT [facility]   2/18/2020  9:15 AM Remesic, Elinor Media [facility]   2/19/2020 12:15 PM Remesic, [place] Media [facility]   2/21/2020 12:30 PM Harpal Raja, PT, DPT [facility]   2/24/2020  2:30 PM Harpal Raja, PT, DPT [facility]   2/26/2020 12:15 PM Harpal To PT, DPT [facility]   2/28/2020 11:45 AM Harpal To PT, DPT [facility]

## 2020-01-24 ENCOUNTER — HOSPITAL ENCOUNTER (OUTPATIENT)
Dept: PHYSICAL THERAPY | Age: 85
Discharge: HOME | End: 2020-01-24
Payer: MEDICARE

## 2020-01-24 PROCEDURE — 97110 THERAPEUTIC EXERCISES: CPT | Performed by: PHYSICAL THERAPIST

## 2020-01-24 PROCEDURE — 97530 THERAPEUTIC ACTIVITIES: CPT | Performed by: PHYSICAL THERAPIST

## 2020-01-24 NOTE — PROGRESS NOTES
PT DAILY TREATMENT NOTE    Patient Name: Claudeen Shoemaker  Date:2020  : 1930  [x]  Patient  Verified  Payor: Mary Jane Ice / Plan: [place] MEDICARE PART A & B / Product Type: Medicare /    In time:1230  Out time:1335  Total Treatment Time (min): 65  Total Timed Codes (min): 65  1:1 Treatment Time ( [address] only): 65   Visit #: 13 of 16    Treatment Area: Other abnormalities of gait and mobility [R26.89]    SUBJECTIVE  Pain Level (0-10 scale): 4 knees   Any medication changes, allergies to medications, adverse drug reactions, diagnosis change, or new procedure performed?: [x] No    [] Yes (see summary sheet for update)  Subjective functional status/changes:   [] No changes reported  Pt seen by  yesterday and received an injection in her right hip   Today she said her knees are sore     OBJECTIVE  45 min Therapeutic Exercise:  [x]? See flow sheet :   Rationale: increase ROM and increase strength to improve the patients ability to perform daily activities with decreased pain and symptom levels     20 min Therapeutic Activity:  [x]? See flow sheet :   Rationale: improve coordination, improve balance and increase proprioception  to improve the patients ability to perform daily activities with decreased pain and symptom levels          With   [] TE   [] TA   [] neuro   [] other: Patient Education: [x] Review HEP    [] Progressed/Changed HEP based on:   [] positioning   [] body mechanics   [] transfers   [] heat/ice application    [] other:      Other Objective/Functional Measures: ex per grid      Pain Level (0-10 scale) post treatment: 4    ASSESSMENT/Changes in Function: sore in her hip but tolerated 70' walk with wheeled walker , cues to not drag left foot with step . Did well with standing reach , nervous with standing lift ball over head but did very well. Cues for sit to stand .      Patient will continue to benefit from skilled PT services to modify and progress therapeutic interventions, address functional mobility deficits, address ROM deficits, address strength deficits, analyze and address soft tissue restrictions, analyze and cue movement patterns, analyze and modify body mechanics/ergonomics, assess and modify postural abnormalities and address imbalance/dizziness to attain remaining goals. [x]  See Plan of Care  []  See progress note/recertification  []  See Discharge Summary         Progress towards goals / Updated goals:  Short Term Goals: STG- To be accomplished in 6 treatment(s):  1.  Pt will be independent with HEP to encourage prophylaxis. Eval:held due to time  Last PN: Pt's son reports focus has been on standing and moving at home  Current; pt son notes she walks at home approx 76' loop and sometimes can do this 2x .      2. Pt will be able to perform 5 sit to stands without rest to indicate increased mobility. Eval: able to complete 3  Last PN: able to complete 10 this session, Able to perform 5 sit to stands without rest from [facility]  Current: MET 20 performed 5 without rest break.     Long Term Goals: LTG- To be accomplished in 16 treatment(s):  1.  Pt will be able to enter/exit clinic with FW RW and walk with step through gait pattern to indicate improved community intergration. Eval:entered with WC, step to gait pattern   Last PN: walked with RW from stepper to parallel bars with cues to lift left LE  Current: improved dania with RW today wth walking from stepper to chair outside of parallel bars progressing 20, same      2.  Pt will improve 5 time sit to  less than 30 sec Mod I to indicate improved mobility and functional strength. Eval:5 time sit to stand: able to complete 3 times at 30.3 sec from 20 in with bilateral UE  Last PN:  Able to perform 5 sit to stands without rest from [facility]  Current:      3.  Pt will improve TUG to 30 sec with FW RW and mod I to indicate improved mobility.   Eval:TU.56 sec with FW RW and CGA with step to gait pattern   Last PN: TUG: trial 1: 104 sec with RW, SBA and step through gait, took 20 sec to stand  Trial 2: 96 sec with RW and SBA and step through, took 19 sec to stand  Current: 4 min 3 seconds first  Trial, 2 min 25 sec second trials, 15 seconds sit to stand with increased time with turns progressing 1/23/20    PLAN  [x]  Upgrade activities as tolerated     [x]  Continue plan of care  []  Update interventions per flow sheet       []  Discharge due to:_  []  Other:_      Laina Valverde PT 1/24/2020  1:44 PM    Future Appointments   Date Time Provider Gayle Valencia   1/28/2020 10:45 AM Delcimeghan Och, PT, DPT [facility]   1/30/2020 11:15 AM Delcharito Och, PT, DPT [facility]   1/31/2020 11:00 AM Rosendo Overton [facility]   2/3/2020 12:45 PM Dalton Vera, PT [facility]   2/6/2020 10:30 AM Dalton Vera PT [facility]   2/7/2020 11:00 AM Dalton Vera PT [facility]   2/11/2020  3:45 PM Rosendo Overton [facility]   2/12/2020 12:15 PM Amanda Och, PT, DPT [facility]   2/14/2020  1:15 PM Amanda Och, PT, DPT [facility]   2/18/2020  9:15 AM Rosendo Overton [facility]   2/19/2020 12:15 PM Rosendo Overton [facility]   2/21/2020 12:30 PM Delannaa Och, PT, DPT [facility]   2/24/2020  2:30 PM Delcia Och, PT, DPT [facility]   2/26/2020 12:15 PM Delcia Och, PT, DPT [facility]   2/28/2020 11:45 AM Amanda Cruz PTMARCO [facility]

## 2020-01-28 ENCOUNTER — HOSPITAL ENCOUNTER (OUTPATIENT)
Dept: PHYSICAL THERAPY | Age: 85
Discharge: HOME | End: 2020-01-28
Payer: MEDICARE

## 2020-01-28 PROCEDURE — 97530 THERAPEUTIC ACTIVITIES: CPT

## 2020-01-28 PROCEDURE — 97110 THERAPEUTIC EXERCISES: CPT

## 2020-01-28 NOTE — PROGRESS NOTES
PT DAILY TREATMENT NOTE    Patient Name: Alyssa Babb  Date:2020  : 1930  [x]  Patient  Verified  Payor: Rosalio Loya / Plan: [place] MEDICARE PART A & B / Product Type: Medicare /    In time:10:45 am  Out time:11:45 am  Total Treatment Time (min): 60  Total Timed Codes (min): 60  1:1 Treatment Time ( [address] only): 40   Visit #: 14 of 16    Treatment Area: Other abnormalities of gait and mobility [R26.89]    SUBJECTIVE  Pain Level (0-10 scale): 4  Any medication changes, allergies to medications, adverse drug reactions, diagnosis change, or new procedure performed?: [x] No    [] Yes (see summary sheet for update)  Subjective functional status/changes:   [] No changes reported  \"My hips are as sore as a boil. \"    OBJECTIVE    Modalities Rationale:   min [] Estim, type/location:                                      []  att     []  unatt     []  w/US     []  w/ice    []  w/heat    min []  Mechanical Traction: type/lbs                   []  pro   []  sup   []  int   []  cont    []  before manual    []  after manual    min []  Ultrasound, settings/location:      min []  Iontophoresis w/ dexamethasone, location:                                               []  take home patch       []  in clinic    min []  Ice     []  Heat    location/position:     min []  Vasopneumatic Device, press/temp:     min []  Other:    [] Skin assessment post-treatment (if applicable):    []  intact    []  redness- no adverse reaction     []redness  adverse reaction:           30 min Therapeutic Exercise:  [x]? ? See flow sheet :   Rationale: increase ROM, increase strength, improve coordination, improve balance and increase proprioception to improve the patients ability to perform daily activities with decreased pain and symptom levels        30 min Therapeutic Activity:  [x]? ?  See flow sheet :   Rationale: increase ROM, increase strength, improve coordination, improve balance and increase proprioception  to improve the patients ability to perform daily activities with decreased pain and symptom levels          With   [] TE   [] TA   [] neuro   [] other: Patient Education: [x] Review HEP    [] Progressed/Changed HEP based on:   [] positioning   [] body mechanics   [] transfers   [] heat/ice application    [] other:      Other Objective/Functional Measures:   Gait: increased WB through UE on RW with Right Stance, gait 75 ft, 30 ft with SBA    Pain Level (0-10 scale) post treatment: 6    ASSESSMENT/Changes in Function:   Continued audible and palpable Right LE adjustments with WB. Held standing activities due to hip pain. Very challenged with sit to stands. Patient will continue to benefit from skilled PT services to modify and progress therapeutic interventions, address functional mobility deficits, address ROM deficits, address strength deficits, analyze and address soft tissue restrictions, analyze and cue movement patterns, analyze and modify body mechanics/ergonomics, assess and modify postural abnormalities, address imbalance/dizziness and instruct in home and community integration to attain remaining goals. []  See Plan of Care  []  See progress note/recertification  []  See Discharge Summary         Progress towards goals / Updated goals:  Short Term Goals: STG- To be accomplished in 6 treatment(s):  1.  Pt will be independent with HEP to encourage prophylaxis. Eval:held due to time  Last PN: Pt's son reports focus has been on standing and moving at home  Current; pt son notes she walks at home approx 76' loop and sometimes can do this 2x .      2. Pt will be able to perform 5 sit to stands without rest to indicate increased mobility.    Eval: able to complete 3  Last PN: able to complete 10 this session, Able to perform 5 sit to stands without rest from [facility]  Current: MET 1/21/20 performed 5 without rest break.     Long Term Goals: LTG- To be accomplished in 16 treatment(s):  1.  Pt will be able to enter/exit clinic with FW RW and walk with step through gait pattern to indicate improved community intergration. Eval:entered with WC, step to gait pattern   Last PN: walked with RW from stepper to parallel bars with cues to lift left LE  Current: improved dania with RW today wth walking from stepper to chair outside of parallel bars progressing 20, same      2.  Pt will improve 5 time sit to  less than 30 sec Mod I to indicate improved mobility and functional strength. Eval:5 time sit to stand: able to complete 3 times at 30.3 sec from 20 in with bilateral UE  Last PN:  Able to perform 5 sit to stands without rest from [facility]  Current: 20 unsure as to if 30 sec is realistic goal, increased tolerance to sit to stands,can complete 5 without stopping.     3.  Pt will improve TUG to 30 sec with FW RW and mod I to indicate improved mobility.   Eval:TU.56 sec with FW RW and CGA with step to gait pattern   Last PN: TUG: trial 1: 104 sec with RW, SBA and step through gait, took 20 sec to stand  Trial 2: 96 sec with RW and SBA and step through, took 19 sec to stand  Current: 4 min 3 seconds first  Trial, 2 min 25 sec second trials, 15 seconds sit to stand with increased time with turns progressing 20       PLAN  []  Upgrade activities as tolerated     []  Continue plan of care  []  Update interventions per flow sheet       []  Discharge due to:_  []  Other:_      Yaakov Prader, PT, DPT 2020  11:24 AM    Future Appointments   Date Time Provider Gayle Valencia   2020 11:15 AM Santiago Patterson, PT, DPT [facility]   2020 11:00 AM Hilary Overton [facility]   2/3/2020 12:45 PM Shanique Panda PT LIG[facility]   2020 10:30 AM [place] [facility] [place] [facility]   2020 11:00 AM MANDY Lackey [facility]   2020  3:45 PM Hilary Overton [facility]   2020 12:15 PM Santiago Patterson PT, JUSTINT LIGIA [facility]   2020  1:15 PM Santiago Patterson PT, MARCO STRONG [facility]   2020  9:15 AM Remesic, Kike Fabian [facility]   2/19/2020 12:15 PM Remesic, Kike Fabian [facility]   2/21/2020 12:30 PM Antonio Wong PT, DPT [facility]   2/24/2020  2:30 PM Antonio Wong PT, DPT [facility]   2/26/2020 12:15 PM nAtonio Wong PT, DPT [facility]   2/28/2020 11:45 AM Antonio Wong PT, DPT [facility]

## 2020-01-30 ENCOUNTER — HOSPITAL ENCOUNTER (OUTPATIENT)
Dept: PHYSICAL THERAPY | Age: 85
Discharge: HOME | End: 2020-01-30
Payer: MEDICARE

## 2020-01-30 PROCEDURE — 97530 THERAPEUTIC ACTIVITIES: CPT

## 2020-01-30 PROCEDURE — 97110 THERAPEUTIC EXERCISES: CPT

## 2020-01-30 NOTE — PROGRESS NOTES
PT DAILY TREATMENT NOTE    Patient Name: Nicolás Mendieta  Date:2020  : 1930  [x]  Patient  Verified  Payor: Marty Drafts / Plan: [place] & B / Product Type: Medicare /    In time:11:18 am  Out time: 12:15 pm  Total Treatment Time (min): 57  Total Timed Codes (min): 57  1:1 Treatment Time ( [address] only): 40   Visit #: 15 of 16    Treatment Area: Other abnormalities of gait and mobility [R26.89]    SUBJECTIVE  Pain Level (0-10 scale): 4  Any medication changes, allergies to medications, adverse drug reactions, diagnosis change, or new procedure performed?: [x] No    [] Yes (see summary sheet for update)  Subjective functional status/changes:   [] No changes reported  \"My hips have been sore. \"    OBJECTIVE    Modalities Rationale:       min [] Estim, type/location:                                      []  att     []  unatt     []  w/US     []  w/ice    []  w/heat    min []  Mechanical Traction: type/lbs                   []  pro   []  sup   []  int   []  cont    []  before manual    []  after manual    min []  Ultrasound, settings/location:      min []  Iontophoresis w/ dexamethasone, location:                                               []  take home patch       []  in clinic    min []  Ice     []  Heat    location/position:     min []  Vasopneumatic Device, press/temp:     min []  Other:    [] Skin assessment post-treatment (if applicable):    []  intact    []  redness- no adverse reaction     []redness  adverse reaction:        27 min Therapeutic Exercise:  [x]? ?? See flow sheet :   Rationale: increase ROM, increase strength, improve coordination, improve balance and increase proprioception to improve the patients ability to perform daily activities with decreased pain and symptom levels        30 min Therapeutic Activity:  [x]? ??  See flow sheet : included walking with pt and discussion of progress with pt and pt's son and their goals for PT as well as realistic expectations. Rationale: increase ROM, increase strength, improve coordination, improve balance and increase proprioception  to improve the patients ability to perform daily activities with decreased pain and symptom levels          With   [] TE   [] TA   [] neuro   [] other: Patient Education: [x] Review HEP    [] Progressed/Changed HEP based on:   [] positioning   [] body mechanics   [] transfers   [] heat/ice application    [] other:      Other Objective/Functional Measures:   See goals for data      Pain Level (0-10 scale) post treatment: 8    ASSESSMENT/Changes in Function:   Pt reports feeling significant progress since starting PT despite significant right hip and knee pain. Pt has been seen for 15 visits following right tibial fracture. Have been focusing treatment on mobility - sit<>stands, walking household distances with RW and standing and exercise tolerance. Painful at right hip with walking and weight bearing. Increased time with transfers and increased use of L-spine and decreased use of LE. Right LE rests in ER and knee flexion. Apprehensive with weight bearing through right LE. Discussed with pt and pt's sone goals of PT focusing on safety with transfers, gait inside home and general mobility     Patient will continue to benefit from skilled PT services to modify and progress therapeutic interventions, address functional mobility deficits, address ROM deficits, address strength deficits, analyze and address soft tissue restrictions, analyze and cue movement patterns, analyze and modify body mechanics/ergonomics, assess and modify postural abnormalities, address imbalance/dizziness and instruct in home and community integration to attain remaining goals.      []  See Plan of Care  [x]  See progress note/recertification  []  See Discharge Summary         Progress towards goals / Updated goals:  Short Term Goals: STG- To be accomplished in 6 treatment(s):  1.  Pt will be independent with HEP to encourage prophylaxis. Eval:held due to time  Last PN: Pt's son reports focus has been on standing and moving at home  Current; pt son notes she walks at home approx 76' loop and sometimes can do this 2x .      2. Pt will be able to perform 5 sit to stands without rest to indicate increased mobility. Eval: able to complete 3  Last PN: able to complete 10 this session, Able to perform 5 sit to stands without rest from [facility]  Current: MET 20 performed 5 without rest break.     Long Term Goals: LTG- To be accomplished in 16 treatment(s):  1.  Pt will be able to enter/exit clinic with FW RW and walk with step through gait pattern to indicate improved community intergration. Eval:entered with WC, step to gait pattern   Last PN: walked with RW from stepper to parallel bars with cues to lift left LE  Current: improved dania with RW today wth walking from stepper to chair outside of parallel bars progressing 20     2.  Pt will improve 5 time sit to  less than 30 sec Mod I to indicate improved mobility and functional strength. Eval:5 time sit to stand: able to complete 3 times at 30.3 sec from 20 in with bilateral UE  Last PN:  Able to perform 5 sit to stands without rest from [facility]  Current: Progressing 20 unsure as to if 30 sec is realistic goal, increased tolerance to sit to stands can complete 5 without stopping from WC in 90.4 sec     3.  Pt will improve TUG to 30 sec with FW RW and mod I to indicate improved mobility.   Eval:TU.56 sec with FW RW and CGA with step to gait pattern   Last PN: TUG: trial 1: 104 sec with RW, SBA and step through gait, took 20 sec to stand  Trial 2: 96 sec with RW and SBA and step through, took 19 sec to stand  Current: 4 min 3 seconds first  Trial, 2 min 25 sec second trials, 15 seconds sit to stand with increased time with turns progressing 20          PLAN  [x]  Upgrade activities as tolerated     []  Continue plan of care  []  Update interventions per flow sheet []  Discharge due to:_  []  Other:_      Rickey Malhotra, PT, DPT 1/30/2020  11:55 AM    Future Appointments   Date Time Provider [place] Lovei   1/31/2020 11:00 AM Remesic, Rain James [facility]   2/3/2020 12:45 PM Michel Pique, PT [facility]   2/6/2020 10:30 AM Michel Pique, PT [facility]   2/7/2020 11:00 AM Michel Pique, PT [facility]   2/11/2020  3:45 PM Remesic, Rain James [facility]   2/12/2020 12:15 PM Geoffery Hampshire, PT, DPT [facility]   2/14/2020  1:15 PM Geoffery Hampshire, PT, DPT [facility]   2/18/2020  9:15 AM Remesic, Rain James [facility]   2/19/2020 12:15 PM Remesic, Rain James [facility]   2/21/2020 12:30 PM Geoffery Hampshire, PT, DPT [facility]   2/24/2020  2:30 PM Geoffery Hampshire, PT, DPT [facility]   2/26/2020 12:15 PM Geoffery Hampshire, PT, DPT [facility]   2/28/2020 11:45 AM Geoffery Hampshire, PT, DPT [facility]

## 2020-01-31 ENCOUNTER — HOSPITAL ENCOUNTER (OUTPATIENT)
Dept: PHYSICAL THERAPY | Age: 85
Discharge: HOME | End: 2020-01-31
Payer: MEDICARE

## 2020-01-31 PROCEDURE — 97110 THERAPEUTIC EXERCISES: CPT

## 2020-01-31 PROCEDURE — 97530 THERAPEUTIC ACTIVITIES: CPT

## 2020-01-31 NOTE — PROGRESS NOTES
[facility] at the [address], [place], [address] [facility]  Phone: [phone]      Fax:  [phone]    Progress Note  Patient name: Alina Hughes Start of Care: 12/17/2019   Referral source: Liliana Donato MD FOD: 94/7/8370               Medical Diagnosis: Other abnormalities of gait and mobility [R26.89]    Onset Date:DOI August 2019               Treatment Diagnosis: Gait Dysfunction    Prior Hospitalization: see medical history Provider#: [id number]   Medications: Verified on Patient summary List    Comorbidities: HTN, Allergies, Arthritis    Prior Level of Function: Prior to fall \"I just started using a cane\", Mod I with all ADLs       Visits from Start of Care: 15    Missed Visits: 0    Progress Towards Goals: Short Term Goals: STG- To be accomplished in 6 treatment(s):  1.  Pt will be independent with HEP to encourage prophylaxis. Eval:held due to time  Last PN: Pt's son reports focus has been on standing and moving at home  Current; pt son notes she walks at home approx 76' loop and sometimes can do this 2x .      2. Pt will be able to perform 5 sit to stands without rest to indicate increased mobility. Eval: able to complete 3  Last PN: able to complete 10 this session, Able to perform 5 sit to stands without rest from [facility]  Current: MET 1/21/20 performed 5 without rest break.     Long Term Goals: LTG- To be accomplished in 16 treatment(s):  1.  Pt will be able to enter/exit clinic with FW RW and walk with step through gait pattern to indicate improved community intergration. Eval:entered with WC, step to gait pattern   Last PN: walked with RW from stepper to parallel bars with cues to lift left LE  Current: improved dania with RW today wth walking from stepper to chair outside of parallel bars progressing 1/30/20     2.  Pt will improve 5 time sit to  less than 30 sec Mod I to indicate improved mobility and functional strength.   Eval:5 time sit to stand: able to complete 3 times at 30.3 sec from 20 in with bilateral UE  Last PN:  Able to perform 5 sit to stands without rest from [facility]  Current: Progressing 20 unsure as to if 30 sec is realistic goal, increased tolerance to sit to stands can complete 5 without stopping from  in 90.4 sec     3.  Pt will improve TUG to 30 sec with FW RW and mod I to indicate improved mobility. Eval:TU.56 sec with FW RW and CGA with step to gait pattern   Last PN: TUG: trial 1: 104 sec with RW, SBA and step through gait, took 20 sec to stand  Trial 2: 96 sec with RW and SBA and step through, took 19 sec to stand  Current: 4 min 3 seconds first  Trial, 2 min 25 sec second trials, 15 seconds sit to stand with increased time with turns progressing 20       Key Functional Changes:   Pt reports feeling significant progress since starting PT despite significant right hip and knee pain. Pt has been seen for 15 visits following right tibial fracture. Have been focusing treatment on mobility - sit<>stands, walking household distances with RW and standing and exercise tolerance. Painful at right hip with walking and weight bearing. Increased time with transfers and increased use of L-spine and decreased use of LE. Right LE rests in ER and knee flexion. Apprehensive with weight bearing through right LE. Discussed with pt and pt's sone goals of PT focusing on safety with transfers, gait inside home and general mobility     Updated Goals: to be achieved in 12 treatments:  2.  Pt will improve 5 time sit to  less than 30 sec Mod I to indicate improved mobility and functional strength. Eval:5 time sit to stand: able to complete 3 times at 30.3 sec from 20 in with bilateral UE  Last PN:  Able to perform 5 sit to stands without rest from [facility]  Current: Progressing 20 unsure as to if 30 sec is realistic goal, increased tolerance to sit to stands can complete 5 without stopping from [facility] in 90.4 sec  2.  Updated to 5 sit to stands from [facility] in less than 1 min Mod I to indicate improved mobility and functional strength.       3.  Pt will improve TUG to 30 sec with FW RW and mod I to indicate improved mobility. Eval:TU.56 sec with FW RW and CGA with step to gait pattern   Last PN: TUG: trial 1: 104 sec with RW, SBA and step through gait, took 20 sec to stand  Trial 2: 96 sec with RW and SBA and step through, took 19 sec to stand  Current: 4 min 3 seconds first  Trial, 2 min 25 sec second trials, 15 seconds sit to stand with increased time with turns progressing 20  3. Updated: TUG in less than 94 sec with RW W RW and mod I to indicate improved mobility. ASSESSMENT/RECOMMENDATIONS:  [x]Continue therapy per initial plan/protocol at a frequency of  12 vists  []Continue therapy with the following recommended changes:_____________________      _____________________________________________________________________  []Discontinue therapy progressing towards or have reached established goals  []Discontinue therapy due to lack of appreciable progress towards goals  []Discontinue therapy due to lack of attendance or compliance  []Await Physician's recommendations/decisions regarding therapy  []Other:________________________________________________________________    Thank you for this referral.   Juan Manuel Guadarrama, PT, DPT 2020 7:17 PM  NOTE TO PHYSICIAN:  [facility]  AND   FAX TO [facility]: [phone]  If you are unable to process this request in 24 hours please contact our office: [phone]        []  I have read the above report and request that my patient continue as recommended. []  I have read the above report and request that my patient continue therapy with the following changes/special instructions:________________________________________  []I have read the above report and request that my patient be discharged from therapy.

## 2020-01-31 NOTE — PROGRESS NOTES
PT DAILY TREATMENT NOTE    Patient Name: Joanne Cid  Date:2020  : 1930  [x]  Patient  Verified  Payor: [place] MEDICARE / Plan: [place] MEDICARE PART A & B / Product Type: Medicare /    In time:11:00  Out time:12:00  Total Treatment Time (min): 60  Total Timed Codes (min): 60  1:1 Treatment Time ( [address] only): 54   Visit #: 16 of 27    Treatment Area: Other abnormalities of gait and mobility [R26.89]    SUBJECTIVE  Pain Level (0-10 scale): 4  Any medication changes, allergies to medications, adverse drug reactions, diagnosis change, or new procedure performed?: [x] No    [] Yes (see summary sheet for update)  Subjective functional status/changes:   [] No changes reported  \"My thighs are sore. \"    OBJECTIVE        25 min Therapeutic Exercise:  [x] See flow sheet :   Rationale: increase ROM and increase strength to improve the patients ability to perform daily activities with decreased pain and symptom levels    35 min Therapeutic Activity:  [x]  See flow sheet :   Rationale: improve coordination, improve balance and increase proprioception  to improve the patients ability to perform daily activities with decreased pain and symptom levels       With   [] TE   [] TA   [] neuro   [] other: Patient Education: [x] Review HEP    [] Progressed/Changed HEP based on:   [] positioning   [] body mechanics   [] transfers   [] heat/ice application    [] other:      Other Objective/Functional Measures:  Increased time reaching with right hand standing to RW  Very challenged with waking backwards in // bars       Pain Level (0-10 scale) post treatment: 5    ASSESSMENT/Changes in Function: Pt able to tolerated more standing activites today with only 2 rest breaks however still remains challenged with weight shifting to right LE. Very slow dania with walking backwards today in // bars.      Patient will continue to benefit from skilled PT services to modify and progress therapeutic interventions, address functional mobility deficits, address strength deficits, analyze and cue movement patterns, analyze and modify body mechanics/ergonomics, assess and modify postural abnormalities, address imbalance/dizziness and instruct in home and community integration to attain remaining goals. []  See Plan of Care  []  See progress note/recertification  []  See Discharge Summary         Progress towards goals / Updated goals:  Short Term Goals: STG- To be accomplished in 6 treatment(s):  1.  Pt will be independent with HEP to encourage prophylaxis. Eval:held due to time  Last PN: pt son notes she walks at home approx 76' loop and sometimes can do this 2x  Current: .      2. Pt will be able to perform 5 sit to stands without rest to indicate increased mobility. Eval: able to complete 3  Last PN: MET  performed 5 without rest break.     Long Term Goals: LTG- To be accomplished in 16 treatment(s):  1.  Pt will be able to enter/exit clinic with FW RW and walk with step through gait pattern to indicate improved community intergration. Eval:entered with WC, step to gait pattern   Last PN improved dania with RW today wth walking from stepper to chair outside of parallel bars  Current:        2.  Pt will improve 5 time sit to  less than 30 sec Mod I to indicate improved mobility and functional strength. Eval:5 time sit to stand: able to complete 3 times at 30.3 sec from 20 in with bilateral UE  Last PN:  Able to perform 5 sit to stands without rest from [facility]  Current: Progressing 1/30/20 unsure as to if 30 sec is realistic goal, increased tolerance to sit to stands can complete 5 without stopping from WC in 90.4 sec  2.  Updated to 5 sit to stands from [facility] in less than 1 min Mod I to indicate improved mobility and functional strength  Current: challenged with placing weight through right LE upon standing and increased time with reaching with right hand to RW        3.  Pt will improve TUG to 30 sec with FW RW and mod I to indicate improved mobility. Eval:TU.56 sec with FW RW and CGA with step to gait pattern   Last PN: TUG: trial 1: 104 sec with RW, SBA and step through gait, took 20 sec to stand  Trial 2: 96 sec with RW and SBA and step through, took 19 sec to stand  Current: 4 min 3 seconds first  Trial, 2 min 25 sec second trials, 15 seconds sit to stand with increased time with turns   3. Updated: TUG in less than 94 sec with RW W RW and mod I to indicate improved mobility.   Current:     PLAN  [x]  Upgrade activities as tolerated     [x]  Continue plan of care  []  Update interventions per flow sheet       []  Discharge due to:_  []  Other:_      Paulina Chiu 2020  10:51 AM    Future Appointments   Date Time Provider [place]   2020 11:00 AM Jenise Overton [facility]   2/3/2020 12:45 PM Jude Shultz PT [facility]   2020 10:30 AM Jude Shultz PT [facility]   2020 11:00 AM Jude Shultz PT [facility]   2020  3:45 PM Jenise Overton [facility]   2020 12:15 PM Najma Patel PT, DPT [facility]   2020  1:15 PM Najma Patel PT, DPT [facility]   2020  9:15 AM Jenise Overton [facility]   2020 12:15 PM Jenise Overton [facility]   2020 12:30 PM Najma Patel PT, DPT [facility]   2020  2:30 PM Najma Patel PT, DPT [facility]   2020 12:15 PM Najma Patel PT, DPT [facility]   2020 11:45 AM Najma Patel PT, DPT [facility]

## 2020-02-03 ENCOUNTER — HOSPITAL ENCOUNTER (OUTPATIENT)
Dept: PHYSICAL THERAPY | Age: 85
Discharge: HOME | End: 2020-02-03
Payer: MEDICARE

## 2020-02-03 PROCEDURE — 97110 THERAPEUTIC EXERCISES: CPT | Performed by: PHYSICAL THERAPIST

## 2020-02-03 PROCEDURE — 97530 THERAPEUTIC ACTIVITIES: CPT | Performed by: PHYSICAL THERAPIST

## 2020-02-03 NOTE — PROGRESS NOTES
[facility]  Neuroscience   [address] [facility] [place], [address]  Office:  [phone]  Fax: [phone]                  Initial Office Exam  Patient Name: Marc Wyatt  Age: 80 y.o. Gender: female   Handedness: right handed   Presenting Concern: memory loss  Referring Provider: Shiela Cantu FOR REFERRAL:  This comprehensive and medically necessary neuropsychological assessment was requested to assist a differential diagnosis of cognitive complaints as well as to comment on capacity for medical decision making. The use and purpose of this examination, as well as the extent and limitations of confidentiality, were explained prior to obtaining permission to participate. Instructions were provided regarding the necessity to put forth optimal effort and answer questions truthfully in order to obtain reliable and accurate test results. REVIEW OF RECORDS:  Ms. Ana Powell was referred by neurology where she is followed for memory loss which first emerged three years ago. Concerns for decision making have also been raised. Ms. Ana Powell is  and resides with her son who manages her finances. She is independent for ADLs but no longer drives. Records indicate that there is a pending lawsuit being brought by Ms. Hughes's other children stating that her son Trevin Licea has a \"undue influence\" on her. Medications include aspirin, hydrochlorothiazide, losartan, and metoprolol XL 70. An MRI has been ordered. CLINICAL INTERVIEW:  Ms. Ana Powell arrived for her appointment accompanied by her adult son who participated in the clinical interview. Consistent with records, they reported memory loss which first emerged 2-3 years ago. According to her son, memory has recently shown some improvement. History is negative for syncope, seizure, stroke, and head trauma. Sleep and appetite disturbance was denied. Pain complaints include arthritis.  Family history of neurological illness was denied. With regard to emotional functioning, Ms. Hughes denied a significant psychiatric history. Socially, Ms. Hughes has been  since 2004; she resides with her son. An additional biological child and one adopted child lives outside of the home. Academically, Ms. Hughes completed her bachelor's degree in mathematics. She denied a history of LD and ADHD. Hobbies include watching television, reading the newspaper, weekly hair appointments, and weekly luncheon's. Functionally, Ms. Sukhi Renee son no longer drives. Although her son maintains medical and financial POA, she manages her medications and bill payment independently. Since breaking her leg in August 2019, Ms. Hughes receives assistance for shopping, meal preparation, housekeeping, and laundry. In home health care provides services three days a week for ADL care. In home skilled care has been received for this injury. CAPACITY:  Ms. Floyd Schrader was engaged in a semi-structured interview regarding medical decision making. She was able to demonstrate familiarity with her active medical problems, treatment planning including prescribed medical regimens. MENTAL STATUS:    Sensorium  Awake, Aware, Alert   Orientation person, place and situation   Relations cooperative   Eye Contact appropriate   Appearance:  age appropriate   Motor Behavior:  within normal limits   Speech:  normal pitch and normal volume   Vocabulary average   Thought Process: within normal limits   Thought Content free of delusions and free of hallucinations   Suicidal ideations none   Homicidal ideations none   Mood:  euthymic   Affect:  mood-congruent   Memory recent  impaired   Memory remote:  adequate   Concentration:  adequate   Abstraction:  abstract   Insight:  fair   Reliability fair   Judgment:  fair         DIAGNOSTIC IMPRESSIONS:  1. Cognitive Decline: R/O Major Neurocognitive Disorder       PLAN:  1.  Complete a comprehensive neuropsychological assessment to provide a differential diagnosis of presenting concerns as well as to assist with disposition and treatment planning as appropriate. 2. Consider compensatory and remedial cognitive training. 3. Consider nonpharmacological interventions for mood disorder. 4. Consider referral for elder health nurse to provide an in-home functional assessment. 5. Consider placement issues to provide greater structure and supervision to ensure safety, health and well-being. [id number] x 1 Review of records. Face to face interview w/ patient. Determine test protocol: 60 minutes. Total 1 unit      Maggi Escobedo, PHD  Licensed Clinical Psychologist    This note was created using voice recognition software. Despite editing, there may be syntax errors. This note will not be viewable in [address]

## 2020-02-03 NOTE — PROGRESS NOTES
PT DAILY TREATMENT NOTE    Patient Name: Angelito Castano  Date:2/3/2020  : 1930  [x]  Patient  Verified  Payor: Abhinav Signs / Plan: [place] MED[place] & B / Product Type: Medicare /    In time:1250  Out time:1350  Total Treatment Time (min): 60  Total Timed Codes (min): 60  1:1 Treatment Time ( [address] only): 45   Visit #: 17 of 27    Treatment Area: Other abnormalities of gait and mobility [R26.89]    SUBJECTIVE  Pain Level (0-10 scale): 0  Any medication changes, allergies to medications, adverse drug reactions, diagnosis change, or new procedure performed?: [x] No    [] Yes (see summary sheet for update)  Subjective functional status/changes:   [x] No changes reported  Pt alert , had chiropractic appt earlier today then out to lunch to dept in W/C with son pt reports no pain at rest in sitting at begin of appt . OBJECTIVE           25 min Therapeutic Exercise:  [x]? See flow sheet :   Rationale: increase ROM and increase strength to improve the patients ability to perform daily activities with decreased pain and symptom levels     35 min Therapeutic Activity:  [x]? See flow sheet :   Rationale: improve coordination, improve balance and increase proprioception  to improve the patients ability to perform daily activities with decreased pain and symptom levels           With   [] TE   [] TA   [] neuro   [] other: Patient Education: [x] Review HEP    [] Progressed/Changed HEP based on:   [] positioning   [] body mechanics   [] transfers   [] heat/ice application    [] other:      Other Objective/Functional Measures: see grid      Pain Level (0-10 scale) post treatment: 5    ASSESSMENT/Changes in Function: pt with pain with walking in right hip knee, at beginning of appt it did not limit her initial walk but end of session it limited her distance and tolerance. Pt had some difficulty backing up when turning around with walker then backing to sit in chair.  Pt did well with sitting PNF with 2# UE and standing and reaching cross body. No adverse affect with therapy today . Patient will continue to benefit from skilled PT services to modify and progress therapeutic interventions, address functional mobility deficits, address ROM deficits, address strength deficits, analyze and address soft tissue restrictions, analyze and cue movement patterns, analyze and modify body mechanics/ergonomics, assess and modify postural abnormalities and address imbalance/dizziness to attain remaining goals. [x]  See Plan of Care  []  See progress note/recertification  []  See Discharge Summary         Progress towards goals / Updated goals:  Short Term Goals: STG- To be accomplished in 6 treatment(s):  1.  Pt will be independent with HEP to encourage prophylaxis. Eval:held due to time  Last PN: pt son notes she walks at home approx 76' loop and sometimes can do this 2x  Current: same      2. Pt will be able to perform 5 sit to stands without rest to indicate increased mobility. Eval: able to complete 3  Last PN: MET  performed 5 without rest break.     Long Term Goals: LTG- To be accomplished in 16 treatment(s):  1.  Pt will be able to enter/exit clinic with FW RW and walk with step through gait pattern to indicate improved community intergration. Eval:entered with WC, step to gait pattern   Last PN improved dania with RW today wth walking from stepper to chair outside of parallel bars  Current:        2.  Pt will improve 5 time sit to  less than 30 sec Mod I to indicate improved mobility and functional strength. Eval:5 time sit to stand: able to complete 3 times at 30.3 sec from 20 in with bilateral UE  Last PN:  Able to perform 5 sit to stands without rest from [facility]  Current: Progressing 1/30/20 unsure as to if 30 sec is realistic goal, increased tolerance to sit to stands can complete 5 without stopping from WC in 90.4 sec  2.  Updated to 5 sit to stands from [facility] in less than 1 min Mod I to indicate improved mobility and functional strength  Current: challenged with placing weight through right LE upon standing and increased time with reaching with right hand to RW        3.  Pt will improve TUG to 30 sec with FW RW and mod I to indicate improved mobility. Eval:TU.56 sec with FW RW and CGA with step to gait pattern   Last PN: TUG: trial 1: 104 sec with RW, SBA and step through gait, took 20 sec to stand  Trial 2: 96 sec with RW and SBA and step through, took 19 sec to stand  Current: 4 min 3 seconds first  Trial, 2 min 25 sec second trials, 15 seconds sit to stand with increased time with turns   3. Updated: TUG in less than 94 sec with RW W RW and mod I to indicate improved mobility.   Current:     PLAN  [x]  Upgrade activities as tolerated     [x]  Continue plan of care  []  Update interventions per flow sheet       []  Discharge due to:_  []  Other:_      Meagan Santana PT 2/3/2020  4:33 PM    Future Appointments   Date Time Provider Gayle Valencia   2020 10:30 AM Jacqueline Oh PT [facility]   2020 11:00 AM Jacqueline Oh PT [facility]   2020  3:45 PM Rems[place]idacAsia [facility]   2020 12:15 PM Crosbyton Laser, PT, DPT [facility]   2020  1:15 PM Cros[place] Laser, PT, DPT [facility]   2020  9:15 AM [place] [facility]   2020 12:15 PM [place] [facility]   2020 12:30 PM Cros[place] Laser, PT, DPT [facility]   2020  2:30 PM Milo Laser, PT, DPT [facility]   2020 12:15 PM Cros[place]ton Laser, PT, DPT [facility]   2020 11:45 AM Crosbyton Laser, PT, DPT [facility]

## 2020-02-06 ENCOUNTER — HOSPITAL ENCOUNTER (OUTPATIENT)
Dept: PHYSICAL THERAPY | Age: 85
Discharge: HOME | End: 2020-02-06
Payer: MEDICARE

## 2020-02-06 PROCEDURE — 97110 THERAPEUTIC EXERCISES: CPT | Performed by: PHYSICAL THERAPIST

## 2020-02-06 PROCEDURE — 97530 THERAPEUTIC ACTIVITIES: CPT | Performed by: PHYSICAL THERAPIST

## 2020-02-07 ENCOUNTER — HOSPITAL ENCOUNTER (OUTPATIENT)
Dept: PHYSICAL THERAPY | Age: 85
Discharge: HOME | End: 2020-02-07
Payer: MEDICARE

## 2020-02-07 PROCEDURE — 97530 THERAPEUTIC ACTIVITIES: CPT | Performed by: PHYSICAL THERAPIST

## 2020-02-07 PROCEDURE — 97110 THERAPEUTIC EXERCISES: CPT | Performed by: PHYSICAL THERAPIST

## 2020-02-07 NOTE — PROGRESS NOTES
PT DAILY TREATMENT NOTE    Patient Name: Grant Yan  Date:2020  : 1930  [x]  Patient  Verified  Payor: Kathe Linker / Plan: [place] MEDICARE PART A & B / Product Type: Medicare /    In time:1030  Out time:1132  Total Treatment Time (min): 62  Total Timed Codes (min): 62  1:1 Treatment Time ( [address] only): 50  Visit #: 18 of 27    Treatment Area: Other abnormalities of gait and mobility [R26.89]    SUBJECTIVE  Pain Level (0-10 scale): 5 bilateral hips   Any medication changes, allergies to medications, adverse drug reactions, diagnosis change, or new procedure performed?: [x] No    [] Yes (see summary sheet for update)  Subjective functional status/changes:   [x] No changes reported      OBJECTIVE        27 min Therapeutic Exercise:  [x]? ? See flow sheet :   Rationale: increase ROM and increase strength to improve the patients ability to perform daily activities with decreased pain and symptom levels     35 min Therapeutic Activity:  [x]? ?  See flow sheet :   Rationale: improve coordination, improve balance and increase proprioception  to improve the patients ability to perform daily activities with decreased pain and symptom levels            With   [] TE   [] TA   [] neuro   [] other: Patient Education: [x] Review HEP    [] Progressed/Changed HEP based on:   [] positioning   [] body mechanics   [] transfers   [] heat/ice application    [] other:      Other Objective/Functional Measures: see ex grid    Sit to stand 5x: approx 20 sec sit to stand and back to sit each consitantly today 5 in row without break    Pain Level (0-10 scale) post treatment: 3     ASSESSMENT/Changes in Function: pt with popping in knee hip with full heel slides in sitting, initial walking, sit to stand and turning to sit with increase smoothness and less cueing , less verbalization of hip discomfort. End of session tolerated increase distance walking and able to walk backwards with walker 5-6 steps without c/o.  In standing close contact guard assist pt difficulty reach up overhead tends to lean on left leg only and stay flexed even with cues attempted 5 x then rested. Patient will continue to benefit from skilled PT services to modify and progress therapeutic interventions, address functional mobility deficits, address ROM deficits, address strength deficits, analyze and address soft tissue restrictions, analyze and cue movement patterns, analyze and modify body mechanics/ergonomics, assess and modify postural abnormalities and address imbalance/dizziness to attain remaining goals. [x]  See Plan of Care  []  See progress note/recertification  []  See Discharge Summary         Progress towards goals / Updated goals:  Short Term Goals: STG- To be accomplished in 6 treatment(s):  1.  Pt will be independent with HEP to encourage prophylaxis. Eval:held due to time  Last PN: pt son notes she walks at home approx 76' loop and sometimes can do this 2x  Current: same , issued orange tband for abd in sitting and HS for HEP      2. Pt will be able to perform 5 sit to stands without rest to indicate increased mobility. Eval: able to complete 3  Last PN: MET  performed 5 without rest break. Takes approx 20 sec sit to stand and back to sit consitantly today 5 in row without break 2/7/20      Long Term Goals: LTG- To be accomplished in 16 treatment(s):  1.  Pt will be able to enter/exit clinic with FW RW and walk with step through gait pattern to indicate improved community intergration. Eval:entered with WC, step to gait pattern   Last PN improved dania with RW today wth walking from stepper to chair outside of parallel bars  Current: increase ease today with walking at end of session.        2.  Pt will improve 5 time sit to  less than 30 sec Mod I to indicate improved mobility and functional strength.   Eval:5 time sit to stand: able to complete 3 times at 30.3 sec from 20 in with bilateral UE  Last PN:  Able to perform 5 sit to stands without rest from [facility]  Current: Progressing 20 unsure as to if 30 sec is realistic goal, increased tolerance to sit to stands can complete 5 without stopping from WC in 90.4 sec       2. Updated to 5 sit to stands from [facility] in less than 1 min Mod I to indicate improved mobility and functional strength  Current: challenged with placing weight through right LE upon standing and increased time with reaching with right hand to RW  Current 2020 : 1:53 min today 5 reps      3.  Pt will improve TUG to 30 sec with FW RW and mod I to indicate improved mobility. Eval:TU.56 sec with FW RW and CGA with step to gait pattern   Last PN: TUG: trial 1: 104 sec with RW, SBA and step through gait, took 20 sec to stand  Trial 2: 96 sec with RW and SBA and step through, took 19 sec to stand  Current: 4 min 3 seconds first  Trial, 2 min 25 sec second trials, 15 seconds sit to stand with increased time with turns   3. Updated: TUG in less than 94 sec with RW W RW and mod I to indicate improved mobility.   Current:     PLAN  [x]  Upgrade activities as tolerated     [x]  Continue plan of care  []  Update interventions per flow sheet       []  Discharge due to:_  []  Other:_      Lady Christian, PT 2020  8:34 AM    Future Appointments   Date Time Provider Gayle Valencia   2020 11:00 AM Jesika Thompson, PT [facility]   2020  3:45 PM Remesic, Genevie Dessert [facility]   2020 12:15 PM Johnson Kidney, PT, DPT [facility]   2020  1:15 PM Johnson Kidney, PT, DPT [facility]   2020  9:15 AM Aaronc Genevie Dessert [facility]   2020 12:15 PM Remesic, Genevie Dessert [facility]   2020 12:30 PM Johnson Kidney, PT, DPT [facility]   2020  2:30 PM Johnson Kidney[facility]   2020 12:15 PM Johnson Kidney, PT, DPT [facility]   2020 11:45 AM Johnson Kidney, PT, DPT [facility]

## 2020-02-07 NOTE — PROGRESS NOTES
PT DAILY TREATMENT NOTE    Patient Name: Erin Juarez  Date:2020  : 1930  [x]  Patient  Verified  Payor: [place] MEDICARE / Plan: [place] MEDICARE PART A & B / Product Type: Medicare /    In time:1100  Out time:1208  Total Treatment Time (min): 68  Total Timed Codes (min): 68  1:1 Treatment Time ( [address] only): 68   Visit #:     Treatment Area: Other abnormalities of gait and mobility [R26.89]    SUBJECTIVE  Pain Level (0-10 scale): 4  Any medication changes, allergies to medications, adverse drug reactions, diagnosis change, or new procedure performed?: [x] No    [] Yes (see summary sheet for update)  Subjective functional status/changes:   [x] No changes reported      OBJECTIVE        30 min Therapeutic Exercise:  [x]? ?? See flow sheet :   Rationale: increase ROM and increase strength to improve the patients ability to perform daily activities with decreased pain and symptom levels     38 min Therapeutic Activity:  [x]? ??  See flow sheet :   Rationale: improve coordination, improve balance and increase proprioception  to improve the patients ability to perform daily activities with decreased pain and symptom levels          With   [] TE   [] TA   [] neuro   [] other: Patient Education: [x] Review HEP    [] Progressed/Changed HEP based on:   [] positioning   [] body mechanics   [] transfers   [] heat/ice application    [] other:      Other Objective/Functional Measures: sit to stand 5x ( 1 reps pt stood for longer due to pain in right hip) 2:27 min total     Back haynes walking 7 steps with wheeled walker      Pain Level (0-10 scale) post treatment: 4    ASSESSMENT/Changes in Function: pt continues to progress with smoothness and endurance in walking and did the best today with back haynes walking as she has yet done.  In standing reach pt has difficulty keeping weight bearing on right leg and tends to shift to left side only , pt did attempt weight shift to right to toe tap with left in standing able to perform 5 x     Patient will continue to benefit from skilled PT services to modify and progress therapeutic interventions, address functional mobility deficits, address ROM deficits, address strength deficits, analyze and address soft tissue restrictions, analyze and cue movement patterns, analyze and modify body mechanics/ergonomics, assess and modify postural abnormalities and address imbalance/dizziness to attain remaining goals. [x]  See Plan of Care  []  See progress note/recertification  []  See Discharge Summary         Progress towards goals / Updated goals:  Short Term Goals: STG- To be accomplished in 6 treatment(s):  1.  Pt will be independent with HEP to encourage prophylaxis. Eval:held due to time  Last PN: pt son notes she walks at home approx 76' loop and sometimes can do this 2x  Current: same , issued orange tband for abd in sitting and HS for HEP      2. Pt will be able to perform 5 sit to stands without rest to indicate increased mobility. Eval: able to complete 3  Last PN: MET  performed 5 without rest break. Takes approx 20 sec sit to stand and back to sit consitantly today 5 in row without break 2/6 /20 .     Long Term Goals: LTG- To be accomplished in 16 treatment(s):  1.  Pt will be able to enter/exit clinic with FW RW and walk with step through gait pattern to indicate improved community intergration. Eval:entered with WC, step to gait pattern   Last PN improved dania with RW today wth walking from stepper to chair outside of parallel bars  Current: increase ease today with walking at end of session.        2.  Pt will improve 5 time sit to  less than 30 sec Mod I to indicate improved mobility and functional strength.   Eval:5 time sit to stand: able to complete 3 times at 30.3 sec from 20 in with bilateral UE  Last PN:  Able to perform 5 sit to stands without rest from [facility]  Current: Progressing 1/30/20 unsure as to if 30 sec is realistic goal, increased tolerance to sit to stands can complete 5 without stopping from WC in 90.4 sec       2. Updated to 5 sit to stands from [facility] in less than 1 min Mod I to indicate improved mobility and functional strength  Current: challenged with placing weight through right LE upon standing and increased time with reaching with right hand to RW  Current :  fluxuates due to pain today 5x in 2:27 min , yesturday 5x in 1:53 min      3.  Pt will improve TUG to 30 sec with FW RW and mod I to indicate improved mobility. Eval:TU.56 sec with FW RW and CGA with step to gait pattern   Last PN: TUG: trial 1: 104 sec with RW, SBA and step through gait, took 20 sec to stand  Trial 2: 96 sec with RW and SBA and step through, took 19 sec to stand  Current: 4 min 3 seconds first  Trial, 2 min 25 sec second trials, 15 seconds sit to stand with increased time with turns   3. Updated: TUG in less than 94 sec with RW W RW and mod I to indicate improved mobility.   Current:     PLAN  [x]  Upgrade activities as tolerated     [x]  Continue plan of care  []  Update interventions per flow sheet       []  Discharge due to:_  []  Other:_      William Rodriguez PT 2020  11:51 AM    Future Appointments   Date Time Provider Gayle Valencia   2020  3:45 PM Remesic, Tula Riedel [facility]   2020 12:15 PM Kade Villanueva PT, DPT [facility]   2020  1:15 PM Kade Villanueva PT, DPT [facility]   2020  9:15 AM Remesic, Tula Riedel [facility]   2020 12:15 PM Remesic, Tula Riedel [facility]   2020 12:30 PM Kade Villanueva PT, DPT [facility]   2020  2:30 PM Kade Villanueva PT, DPT [facility]   2020 12:15 PM Kade Villanueva PT, DPT [facility]   2020 11:45 AM K[facility]

## 2020-02-11 ENCOUNTER — HOSPITAL ENCOUNTER (OUTPATIENT)
Dept: PHYSICAL THERAPY | Age: 85
Discharge: HOME | End: 2020-02-11
Payer: MEDICARE

## 2020-02-11 PROCEDURE — 97530 THERAPEUTIC ACTIVITIES: CPT

## 2020-02-11 PROCEDURE — 97110 THERAPEUTIC EXERCISES: CPT

## 2020-02-11 NOTE — PROGRESS NOTES
PT DAILY TREATMENT NOTE    Patient Name: Harmony Snow  ADGE:  : 1930  [x]  Patient  Verified  Payor: Sudheer Judi / Plan: [place] MEDICARE PART A & B / Product Type: Medicare /    In time:3:45  Out time:4:55  Total Treatment Time (min): 70  Total Timed Codes (min): 70  1:1 Treatment Time ( [address] only): 50   Visit #: 20 of 27    Treatment Area: Other abnormalities of gait and mobility [R26.89]    SUBJECTIVE  Pain Level (0-10 scale): 4  Any medication changes, allergies to medications, adverse drug reactions, diagnosis change, or new procedure performed?: [x] No    [] Yes (see summary sheet for update)  Subjective functional status/changes:   [] No changes reported  \"My hip. \"    OBJECTIVE      50 min Therapeutic Exercise:  [x] See flow sheet :   Rationale: increase ROM and increase strength to improve the patients ability to perform daily activities with decreased pain and symptom levels    20 min Therapeutic Activity:  [x]  See flow sheet :   Rationale: improve coordination, improve balance and increase proprioception  to improve the patients ability to perform daily activities with decreased pain and symptom levels     With   [] TE   [] TA   [] neuro   [] other: Patient Education: [x] Review HEP    [] Progressed/Changed HEP based on:   [] positioning   [] body mechanics   [] transfers   [] heat/ice application    [] other:      Other Objective/Functional Measures:   Significant decreased speed with walking backwards  Improved sit to stands      Pain Level (0-10 scale) post treatment: 4    ASSESSMENT/Changes in Function: Pt very challenged with standing exercises at the wall today with SBA from therapist and RW in front of pt. Easily fatigued with walking backwards. Pt still very hesistant with placing weight on right LE and lifting left in standing.      Patient will continue to benefit from skilled PT services to modify and progress therapeutic interventions, address functional mobility deficits, address strength deficits, analyze and cue movement patterns, analyze and modify body mechanics/ergonomics, assess and modify postural abnormalities, address imbalance/dizziness and instruct in home and community integration to attain remaining goals. []  See Plan of Care  []  See progress note/recertification  []  See Discharge Summary         Progress towards goals / Updated goals:  Short Term Goals: STG- To be accomplished in 6 treatment(s):  1.  Pt will be independent with HEP to encourage prophylaxis. Eval:held due to time  Last PN: pt son notes she walks at home approx 76' loop and sometimes can do this 2x  Current: compliance per son report, walking everyday goal MET     2. Pt will be able to perform 5 sit to stands without rest to indicate increased mobility. Eval: able to complete 3  Last PN: MET  performed 5 without rest break. Takes approx 20 sec sit to stand and back to sit consitantly today 5 in row without break 2/6 /20 .     Long Term Goals: LTG- To be accomplished in 16 treatment(s):  1.  Pt will be able to enter/exit clinic with FW RW and walk with step through gait pattern to indicate improved community intergration. Eval:entered with WC, step to gait pattern   Last PN improved dania with RW today wth walking from stepper to chair outside of parallel bars  Current: increase ease today with walking at end of session, less VC to decrease foot drag        2.  Pt will improve 5 time sit to  less than 30 sec Mod I to indicate improved mobility and functional strength.   Eval:5 time sit to stand: able to complete 3 times at 30.3 sec from 20 in with bilateral UE  Last PN:  Able to perform 5 sit to stands without rest from [facility]  Current: Progressing 1/30/20 unsure as to if 30 sec is realistic goal, increased tolerance to sit to stands can complete 5 without stopping from WC in 90.4 sec        2. Updated to 5 sit to stands from [facility] in less than 1 min Mod I to indicate improved mobility and functional strength  Current: challenged with placing weight through right LE upon standing and increased time with reaching with right hand to RW  Current :  fluxuates due to pain today 5x in 2:27 min , yesturday 5x in 1:53 min      3.  Pt will improve TUG to 30 sec with FW RW and mod I to indicate improved mobility. Eval:TU.56 sec with FW RW and CGA with step to gait pattern   Last PN: TUG: trial 1: 104 sec with RW, SBA and step through gait, took 20 sec to stand  Trial 2: 96 sec with RW and SBA and step through, took 19 sec to stand  Current: 4 min 3 seconds first  Trial, 2 min 25 sec second trials, 15 seconds sit to stand with increased time with turns   3. Updated: TUG in less than 94 sec with RW W RW and mod I to indicate improved mobility.   Current:        PLAN  [x]  Upgrade activities as tolerated     [x]  Continue plan of care  []  Update interventions per flow sheet       []  Discharge due to:_  []  Other:_      Ephriam Manual Remesic 2020  6:28 PM    Future Appointments   Date Time Provider Gayle Valencia   2020 10:45 AM Remesic, Ephriam Manual [facility]   2020  1:15 PM Derek Olguin, PT, DPT [facility]   2020  9:15 AM Remesic, Ephriam Manual [facility]   2020 12:15 PM Remesic, Ephriam Manual [facility]   2020 12:30 PM Derek Olguin, PT, DPT [facility]   2020  2:30 PM Derek Olguin, PT, DPT [facility]   2020 12:15 PM Derek Olguin, PT, DPT [facility]   2020 11:45 AM Derek Olguin, PT, DPT [facility]

## 2020-02-12 ENCOUNTER — HOSPITAL ENCOUNTER (OUTPATIENT)
Dept: PHYSICAL THERAPY | Age: 85
Discharge: HOME | End: 2020-02-12
Payer: MEDICARE

## 2020-02-12 PROCEDURE — 97110 THERAPEUTIC EXERCISES: CPT

## 2020-02-12 PROCEDURE — 97530 THERAPEUTIC ACTIVITIES: CPT

## 2020-02-12 NOTE — PROGRESS NOTES
PT DAILY TREATMENT NOTE    Patient Name: Reyes Cliche  QYD  : 1930  [x]  Patient  Verified  Payor: Gabi Avendaño / Plan: [place] / Product Type: Medicare /    In time:10:40  Out time:11:40  Total Treatment Time (min): 60  Total Timed Codes (min): 60  1:1 Treatment Time ( [address] only): 45   Visit #: 21 of 27    Treatment Area: Other abnormalities of gait and mobility [R26.89]    SUBJECTIVE  Pain Level (0-10 scale): 3-4  Any medication changes, allergies to medications, adverse drug reactions, diagnosis change, or new procedure performed?: [x] No    [] Yes (see summary sheet for update)  Subjective functional status/changes:   [] No changes reported  \"My hip is killing me. \"    OBJECTIVE    30 min Therapeutic Exercise:  [x] See flow sheet :   Rationale: increase ROM and increase strength to improve the patients ability to perform daily activities with decreased pain and symptom levels    30 min Therapeutic Activity:  [x]  See flow sheet :   Rationale: improve coordination, improve balance and increase proprioception  to improve the patients ability to perform daily activities with decreased pain and symptom levels           With   [] TE   [] TA   [] neuro   [] other: Patient Education: [x] Review HEP    [] Progressed/Changed HEP based on:   [] positioning   [] body mechanics   [] transfers   [] heat/ice application    [] other:      Other Objective/Functional Measures:   5x sit to stand 1 min 24 seconds     Pain Level (0-10 scale) post treatment: 3-4    ASSESSMENT/Changes in Function: Improved 5x sit to stand time today with less break in between reps. Still challenged with transition from stand to walk however. Very fatigued with sidestepping in parallel bars and wall wash flexion and sideways at wall.      Patient will continue to benefit from skilled PT services to modify and progress therapeutic interventions, address functional mobility deficits, address strength deficits, analyze and cue movement patterns, analyze and modify body mechanics/ergonomics, assess and modify postural abnormalities, address imbalance/dizziness and instruct in home and community integration to attain remaining goals. []  See Plan of Care  []  See progress note/recertification  []  See Discharge Summary         Progress towards goals / Updated goals:  Short Term Goals: STG- To be accomplished in 6 treatment(s):  1.  Pt will be independent with HEP to encourage prophylaxis. Eval:held due to time  Last PN: pt son notes she walks at home approx 76' loop and sometimes can do this 2x  Current: compliance per son report, walking everyday goal MET     2. Pt will be able to perform 5 sit to stands without rest to indicate increased mobility. Eval: able to complete 3  Last PN: MET  performed 5 without rest break. Takes approx 20 sec sit to stand and back to sit consitantly today 5 in row without break 2/6 /20 .     Long Term Goals: LTG- To be accomplished in 16 treatment(s):  1.  Pt will be able to enter/exit clinic with FW RW and walk with step through gait pattern to indicate improved community intergration. Eval:entered with WC, step to gait pattern   Last PN improved dania with RW today wth walking from stepper to chair outside of parallel bars  Current: increase ease today with walking at end of session, less VC to decrease foot drag        2.  Pt will improve 5 time sit to  less than 30 sec Mod I to indicate improved mobility and functional strength.   Eval:5 time sit to stand: able to complete 3 times at 30.3 sec from 20 in with bilateral UE  Last PN:  Able to perform 5 sit to stands without rest from [facility]  Current: Progressing 1/30/20 unsure as to if 30 sec is realistic goal, increased tolerance to sit to stands can complete 5 without stopping from WC in 90.4 sec        2. Updated to 5 sit to stands from [facility] [address] [address] in less than 1 min Mod I to indicate improved mobility and functional strength  Last PN: : challenged with placing weight through right LE upon standing and increased time with reaching with right hand to RW  Current :  able to complete in 1 min 24 seconds today progressing 20     3.  Pt will improve TUG to 30 sec with FW RW and mod I to indicate improved mobility. Eval:TU.56 sec with FW RW and CGA with step to gait pattern   Last PN: TUG: trial 1: 104 sec with RW, SBA and step through gait, took 20 sec to stand  Trial 2: 96 sec with RW and SBA and step through, took 19 sec to stand  Current: 4 min 3 seconds first  Trial, 2 min 25 sec second trials, 15 seconds sit to stand with increased time with turns   3. Updated: TUG in less than 94 sec with RW W RW and mod I to indicate improved mobility.   Current:     PLAN  [x]  Upgrade activities as tolerated     [x]  Continue plan of care  []  Update interventions per flow sheet       []  Discharge due to:_  []  Other:_      Gunjan Youssef 2020  11:20 AM    Future Appointments   Date Time Provider Gayle Valencia   2020  1:15 PM Neema Clark PT, DPT [facility]   2020  9:15 AM Cami Overton [facility]   2020 12:15 PM Cami Overton [facility]   2020 12:30 PM Neema Clark PT, DPT [facility]   2020  2:30 PM Neema Clark PT, DPT [facility]   2020 12:15 PM Neema Clark PT, DPT [facility]   2020 11:45 AM Neema Clark PT, DPT [facility]

## 2020-02-14 ENCOUNTER — HOSPITAL ENCOUNTER (OUTPATIENT)
Dept: PHYSICAL THERAPY | Age: 85
Discharge: HOME | End: 2020-02-14
Payer: MEDICARE

## 2020-02-14 PROCEDURE — 97530 THERAPEUTIC ACTIVITIES: CPT

## 2020-02-14 PROCEDURE — 97110 THERAPEUTIC EXERCISES: CPT

## 2020-02-14 PROCEDURE — 97112 NEUROMUSCULAR REEDUCATION: CPT

## 2020-02-14 NOTE — PROGRESS NOTES
PT DAILY TREATMENT NOTE    Patient Name: Grant Yan  VKXG:[id number]  : 1930  [x]  Patient  Verified  Payor: Kathe Linker / Plan: [place] MEDICARE PART A & B / Product Type: Medicare /    In time:1:10 pm  Out time:2:05 pm   Total Treatment Time (min): 55  Total Timed Codes (min): 55  1:1 Treatment Time ( [address] only): 55   Visit #: 22 of 27    Treatment Area: Other abnormalities of gait and mobility [R26.89]    SUBJECTIVE  Pain Level (0-10 scale): 5  Any medication changes, allergies to medications, adverse drug reactions, diagnosis change, or new procedure performed?: [x] No    [] Yes (see summary sheet for update)  Subjective functional status/changes:   [] No changes reported  \"My hips are hurting some. \"    OBJECTIVE    Modalities Rationale:     min [] Estim, type/location:                                      []  att     []  unatt     []  w/US     []  w/ice    []  w/heat    min []  Mechanical Traction: type/lbs                   []  pro   []  sup   []  int   []  cont    []  before manual    []  after manual    min []  Ultrasound, settings/location:      min []  Iontophoresis w/ dexamethasone, location:                                               []  take home patch       []  in clinic    min []  Ice     []  Heat    location/position:     min []  Vasopneumatic Device, press/temp:     min []  Other:    [] Skin assessment post-treatment (if applicable):    []  intact    []  redness- no adverse reaction     []redness - adverse reaction:          15 min Therapeutic Exercise:  [x] See flow sheet :   Rationale: increase ROM, increase strength, improve coordination, improve balance and increase proprioception to improve the patients ability to perform daily activities with decreased pain and symptom levels      30 min Therapeutic Activity:  [x]  See flow sheet :   Rationale: increase ROM, increase strength, improve coordination, improve balance and increase proprioception  to improve the patients ability to perform daily activities with decreased pain and symptom levels       10 min Neuromuscular Re-education:  [x]  See flow sheet :   Rationale: increase ROM, increase strength, improve coordination, improve balance and increase proprioception  to improve the patients ability to perform daily activities with decreased pain and symptom levels            With   [] TE   [] TA   [] neuro   [] other: Patient Education: [x] Review HEP    [] Progressed/Changed HEP based on:   [] positioning   [] body mechanics   [] transfers   [] heat/ice application    [] other:      Other Objective/Functional Measures:   Increased trunk flexion with sit to stands    CGA with wall activities  Gait: 75 ft and 15 ft    Pain Level (0-10 scale) post treatment: 7    ASSESSMENT/Changes in Function:   Challenged with wall reaches - attempted to have alt UE at wall, in PNF pattern to force weight shifting and weight bearing. Pt reported increased knee and hip pain with all gait and WB activities this session. Patient will continue to benefit from skilled PT services to modify and progress therapeutic interventions, address functional mobility deficits, address ROM deficits, address strength deficits, analyze and address soft tissue restrictions, analyze and cue movement patterns, analyze and modify body mechanics/ergonomics, assess and modify postural abnormalities, address imbalance/dizziness and instruct in home and community integration to attain remaining goals. []  See Plan of Care  []  See progress note/recertification  []  See Discharge Summary         Progress towards goals / Updated goals:  Short Term Goals: STG- To be accomplished in 6 treatment(s):  1.  Pt will be independent with HEP to encourage prophylaxis.   Eval:held due to time  Last PN: pt son notes she walks at home approx 76' loop and sometimes can do this 2x  Current: compliance per son report, walking everyday goal MET     2. Pt will be able to perform 5 sit to stands without rest to indicate increased mobility. Eval: able to complete 3  Last PN: MET  performed 5 without rest break. Takes approx 20 sec sit to stand and back to sit consitantly today 5 in row without break  .     Long Term Goals: LTG- To be accomplished in 16 treatment(s):  1.  Pt will be able to enter/exit clinic with FW RW and walk with step through gait pattern to indicate improved community intergration. Eval:entered with WC, step to gait pattern   Last PN improved dania with RW today wth walking from stepper to chair outside of parallel bars  Current: increase ease today with walking at end of session, less VC to decrease foot drag        2.  Pt will improve 5 time sit to  less than 30 sec Mod I to indicate improved mobility and functional strength. Eval:5 time sit to stand: able to complete 3 times at 30.3 sec from 20 in with bilateral UE  Last PN:  Able to perform 5 sit to stands without rest from [facility]  Current: Progressing 20 unsure as to if 30 sec is realistic goal, increased tolerance to sit to stands can complete 5 without stopping from WC in 90.4 sec        2. Updated to 5 sit to stands from [facility] in less than 1 min Mod I to indicate improved mobility and functional strength  Last PN: : challenged with placing weight through right LE upon standing and increased time with reaching with right hand to RW  Current :  able to complete in 1 min 24 seconds today progressing 20     3.  Pt will improve TUG to 30 sec with FW RW and mod I to indicate improved mobility. Eval:TU.56 sec with FW RW and CGA with step to gait pattern   Last PN: TUG: trial 1: 104 sec with RW, SBA and step through gait, took 20 sec to stand  Trial 2: 96 sec with RW and SBA and step through, took 19 sec to stand  Current: 4 min 3 seconds first  Trial, 2 min 25 sec second trials, 15 seconds sit to stand with increased time with turns   3. Updated: TUG in less than 94 sec with RW W RW and mod I to indicate improved mobility.   Current: 2/14/20 not officially addressed but noted improved gait speed - challenged to have step through gait with walker vs step tp       PLAN  []  Upgrade activities as tolerated     []  Continue plan of care  []  Update interventions per flow sheet       []  Discharge due to:_  []  Other:_      Gianfranco Giron PT, DPT 2/14/2020  1:19 PM    Future Appointments   Date Time Provider Gayle Valencia   2/18/2020  9:15 AM Inessa Overton [facility]   2/19/2020 12:15 PM Inessa Overton [facility]   2/21/2020 12:30 PM Dee Martin PT, DPT [facility]   2/24/2020  2:30 PM Dee Martin PT, DPT [facility]   2/26/2020 12:15 PM Dee Martin PT, DPT [facility]   2/28/2020 11:45 AM Dee Martin PT, DPT [facility]

## 2020-02-18 ENCOUNTER — HOSPITAL ENCOUNTER (OUTPATIENT)
Dept: PHYSICAL THERAPY | Age: 85
Discharge: HOME | End: 2020-02-18
Payer: MEDICARE

## 2020-02-18 PROCEDURE — 97110 THERAPEUTIC EXERCISES: CPT

## 2020-02-18 PROCEDURE — 97530 THERAPEUTIC ACTIVITIES: CPT

## 2020-02-18 NOTE — PROGRESS NOTES
PT DAILY TREATMENT NOTE    Patient Name: Donna Laurent  FXHU:  : 1930  [x]  Patient  Verified   Payor: Valerie Ortiz / Plan: [place] MEDICARE PART A & B / Product Type: Medicare /    In time:9:  Out time:10:27  Total Treatment Time (min): 70  Total Timed Codes (min): 70  1:1 Treatment Time ( [address] only): 60   Visit #:     Treatment Area: Other abnormalities of gait and mobility [R26.89]    SUBJECTIVE  Pain Level (0-10 scale): 5  Any medication changes, allergies to medications, adverse drug reactions, diagnosis change, or new procedure performed?: [x] No    [] Yes (see summary sheet for update)  Subjective functional status/changes:   [] No changes reported  \"My hip is killing me. The right one. \"    OBJECTIVE      25 min Therapeutic Exercise:  [x] See flow sheet :   Rationale: increase ROM and increase strength to improve the patients ability to perform daily activities with decreased pain and symptom levels    35 min Therapeutic Activity:  [x]  See flow sheet :   Rationale: improve coordination, improve balance and increase proprioception  to improve the patients ability to perform daily activities with decreased pain and symptom levels     With   [] TE   [] TA   [] neuro   [] other: Patient Education: [x] Review HEP    [] Progressed/Changed HEP based on:   [] positioning   [] body mechanics   [] transfers   [] heat/ice application    [] other:      Other Objective/Functional Measures:   TUG score 1 min 12 sec first trial, 1 min 25second second trial       Pain Level (0-10 scale) post treatment: 8    ASSESSMENT/Changes in Function: Improved TUG score today due to decreased time between transition from standing to walking. Pt refused to take another step in parallel bars today while long-term through sidestepping despite encouragement with having to grab RW to walk out of bars. Able to complete step taps on low level step with left LE however beforehand.      Patient will continue to benefit from skilled PT services to modify and progress therapeutic interventions, address functional mobility deficits, address strength deficits, analyze and cue movement patterns, analyze and modify body mechanics/ergonomics, assess and modify postural abnormalities, address imbalance/dizziness and instruct in home and community integration to attain remaining goals. []  See Plan of Care  []  See progress note/recertification  []  See Discharge Summary         Progress towards goals / Updated goals:  Short Term Goals: STG- To be accomplished in 6 treatment(s):  1.  Pt will be independent with HEP to encourage prophylaxis. Eval:held due to time  Last PN: pt son notes she walks at home approx 76' loop and sometimes can do this 2x  Current: compliance per son report, walking everyday goal MET     2. Pt will be able to perform 5 sit to stands without rest to indicate increased mobility. Eval: able to complete 3  Last PN: MET  performed 5 without rest break. Takes approx 20 sec sit to stand and back to sit consitantly today 5 in row without break 2/6 /20 .     Long Term Goals: LTG- To be accomplished in 16 treatment(s):  1.  Pt will be able to enter/exit clinic with FW RW and walk with step through gait pattern to indicate improved community intergration. Eval:entered with WC, step to gait pattern   Last PN improved dania with RW today wth walking from stepper to chair outside of parallel bars  Current: increase ease today with walking at end of session, less VC to decrease foot drag        2.  Pt will improve 5 time sit to  less than 30 sec Mod I to indicate improved mobility and functional strength.   Eval:5 time sit to stand: able to complete 3 times at 30.3 sec from 20 in with bilateral UE  Last PN:  [facility] to perform 5 sit to stands [facility]  Current: Progressing 1/30/20 unsure as to if 30 sec is realistic goal, increased tolerance to sit to stands can complete 5 without stopping from WC in 90.4 sec        2. Updated to 5 sit to stands from [facility] in less than 1 min Mod I to indicate improved mobility and functional strength  Last PN: : challenged with placing weight through right LE upon standing and increased time with reaching with right hand to RW  Current :  able to complete in 1 min 24 seconds today progressing 20     3.  Pt will improve TUG to 30 sec with FW RW and mod I to indicate improved mobility. Eval:TU.56 sec with FW RW and CGA with step to gait pattern   Last PN: TUG: trial 1: 104 sec with RW, SBA and step through gait, took 20 sec to stand  Trial 2: 96 sec with RW and SBA and step through, took 19 sec to stand  Current: 4 min 3 seconds first  Trial, 2 min 25 sec second trials, 15 seconds sit to stand with increased time with turns   3. Updated: TUG in less than 94 sec with RW W RW and mod I to indicate improved mobility.   Current: 1 min 12 sec with RW, improved step length and transition goal MET    PLAN  [x]  Upgrade activities as tolerated     [x]  Continue plan of care  []  Update interventions per flow sheet       []  Discharge due to:_  []  Other:_      Kanwal Mahoney 2020  9:46 AM    Future Appointments   Date Time Provider Gayle Valencia   2020 12:15 PM Rain Overton [facility]   2020 12:30 PM Geoffery Norton, PT, DPT [facility]   2020  2:30 PM Michel Rodriguez PT [facility]   2020 12:15 PM Geoffery Norton, PT, DPT [facility]   2020 11:45 AM Geoffery Norton, PT, DPT [facility]

## 2020-02-19 ENCOUNTER — HOSPITAL ENCOUNTER (OUTPATIENT)
Dept: PHYSICAL THERAPY | Age: 85
Discharge: HOME | End: 2020-02-19
Payer: MEDICARE

## 2020-02-19 PROCEDURE — 97530 THERAPEUTIC ACTIVITIES: CPT

## 2020-02-19 PROCEDURE — 97110 THERAPEUTIC EXERCISES: CPT

## 2020-02-19 NOTE — PROGRESS NOTES
[facility] at the [address], [place], [address] [facility]  Phone: [phone]      Fax:  [phone]    Progress Note  Patient name: Alina Hughes Start of Care: 12/17/2019   Referral source: Karma Donato MD [facility]: 40/[birth year]/1769               Medical Diagnosis: Other abnormalities of gait and mobility [R26.89]    Onset Date:DOI August 2019               Treatment Diagnosis: Gait Dysfunction    Prior Hospitalization: see medical history Provider#: [id number]   Medications: Verified on Patient summary List    Comorbidities: HTN, Allergies, Arthritis    Prior Level of Function: Prior to fall \"I just started using a cane\", Mod I with all ADLs       Visits from Start of Care: 24    Missed Visits: 0    Progress Towards Goals:   Progress towards goals / Updated goals:  Short Term Goals: STG- To be accomplished in 6 treatment(s):  1.  Pt will be independent with HEP to encourage prophylaxis. Eval:held due to time  Last PN: pt son notes she walks at home approx 76' loop and sometimes can do this 2x  Current: compliance per son report, walking everyday goal MET     2. Pt will be able to perform 5 sit to stands without rest to indicate increased mobility. Eval: able to complete 3  Last PN: MET  performed 5 without rest break. Takes approx 20 sec sit to stand and back to sit consitantly today 5 in row without break 2/6 /20 .     Long Term Goals: LTG- To be accomplished in 16 treatment(s):  1.  Pt will be able to enter/exit clinic with FW RW and walk with step through gait pattern to indicate improved community intergration.   Eval:entered with WC, step to gait pattern   Last PN improved dania with RW today wth walking from stepper to chair outside of parallel bars  Current: improving dania with walking with increased step length on left however not step through progressing       2.  Pt will improve 5 time sit to  less than 30 sec Mod I to indicate improved mobility and functional strength. Eval:5 time sit to stand: able to complete 3 times at 30.3 sec from 20 in with bilateral UE  Last PN:  Able to perform 5 sit to stands without rest from [facility]  Current: Progressing 20 unsure as to if 30 sec is realistic goal, increased tolerance to sit to stands can complete 5 without stopping from WC in 90.4 sec  Updated to 5 sit to stands from [facility] in less than 1 min Mod I to indicate improved mobility and functional strength  Last PN: : challenged with placing weight through right LE upon standing and increased time with reaching with right hand to RW  Current :  able to complete in 1 min 24 seconds progressing      3.  Pt will improve TUG to 30 sec with FW RW and mod I to indicate improved mobility. Eval:TU.56 sec with FW RW and CGA with step to gait pattern   Last PN: TUG: trial 1: 104 sec with RW, SBA and step through gait, took 20 sec to stand  Trial 2: 96 sec with RW and SBA and step through, took 19 sec to stand  Current: 4 min 3 seconds first  Trial, 2 min 25 sec second trials, 15 seconds sit to stand with increased time with turns   3. Updated: TUG in less than 94 sec with RW W RW and mod I to indicate improved mobility. Current: 1 min 12 sec with RW, improved step length and transition goal MET      Updated goals 2020  1. Pt will be able to stand > 10 minutes before fatigue to demonstrate improved standing endurance for ADLs. Current: fatigue after 2 minutes of standing exercises in clinic    2. Pt will be able to complete 8 alternating taps on step with UE assist consecutively to demonstrate improved weight shifting onto right LE. Current: 5 steps on same side with breaks in between, increased time lifting left LE      Key Functional Changes:  Pt is making progress towards goals however right hip pain is major limiting factor in standing and walking activities still.  Improved sit to stand transfers with ability to complete 5x sit to  1 min 24 seconds now. Improved stand to walk transition as well with TUG score now < 90 seconds with RW however continued decreased dania and step through pattern. Pt is very challenged with sidestepping in parallel bars due to hesitancy with placing weight on right LE and easily fatigues with standing activities after 2 minutes. Pt would benefit from continued skilled PT services to address gait, strength, transfers and standing tolerance. Updated Goals: to be achieved in 4 weeks:   See goals above    ASSESSMENT/RECOMMENDATIONS:  [x]Continue therapy per initial plan/protocol at a frequency of  3 x per week for 4 weeks  []Continue therapy with the following recommended changes:_____________________      _____________________________________________________________________  []Discontinue therapy progressing towards or have reached established goals  []Discontinue therapy due to lack of appreciable progress towards goals  []Discontinue therapy due to lack of attendance or compliance  []Await Physician's recommendations/decisions regarding therapy  []Other:________________________________________________________________    Thank you for this referral.   Carmina Edmondson [facility] 2/19/2020 2:44 PM  NOTE TO PHYSICIAN:  PLEASE COMPLETE THE ORDERS BELOW AND   FAX TO [facility]: [phone]  If you are unable to process this request in 24 hours please contact our office: [phone]        []  I have read the above report and request that my patient continue as recommended. []  I have read the above report and request that my patient continue therapy with the following changes/special instructions:________________________________________  []I have read the above report and request that my patient be discharged from therapy.

## 2020-02-19 NOTE — PROGRESS NOTES
PT DAILY TREATMENT NOTE    Patient Name: Karin Chiu  OWTP:  : 1930  [x]  Patient  Verified  Payor: Janessa Honey / Plan: [place] / Product Type: Medicare /    In time:12:20  Out time:1:15  Total Treatment Time (min): 55  Total Timed Codes (min): 55  1:1 Treatment Time ( [address] only): 40   Visit #: 24 of 27    Treatment Area: Other abnormalities of gait and mobility [R26.89]    SUBJECTIVE  Pain Level (0-10 scale): 4  Any medication changes, allergies to medications, adverse drug reactions, diagnosis change, or new procedure performed?: [x] No    [] Yes (see summary sheet for update)  Subjective functional status/changes:   [] No changes reported  \"My hip is really hurting from yesterday. \"    OBJECTIVE    25 min Therapeutic Exercise:  [x] See flow sheet :   Rationale: increase ROM and increase strength to improve the patients ability to perform daily activities with decreased pain and symptom levels    30 min Therapeutic Activity:  [x]  See flow sheet :   Rationale: improve coordination, improve balance and increase proprioception  to improve the patients ability to perform daily activities with decreased pain and symptom levels     With   [] TE   [] TA   [] neuro   [] other: Patient Education: [x] Review HEP    [] Progressed/Changed HEP based on:   [] positioning   [] body mechanics   [] transfers   [] heat/ice application    [] other:      Other Objective/Functional Measures:   See updated goals below    Pain Level (0-10 scale) post treatment: 7    ASSESSMENT/Changes in Function: Pt is making progress towards goals however right hip pain is major limiting factor in standing and walking activities still. Improved sit to stand transfers with ability to complete 5x sit to  1 min 24 seconds now. Improved stand to walk transition as well with TUG score now < 90 seconds with RW however continued decreased dania and step through pattern.  Pt is very challenged with sidestepping in parallel bars due to hesitancy with placing weight on right LE and easily fatigues with standing activities after 2 minutes. Pt would benefit from continued skilled PT services to address gait, strength, transfers and standing tolerance. Patient will continue to benefit from skilled PT services to modify and progress therapeutic interventions, address functional mobility deficits, address strength deficits, analyze and cue movement patterns, analyze and modify body mechanics/ergonomics, assess and modify postural abnormalities, address imbalance/dizziness and instruct in home and community integration to attain remaining goals. []  See Plan of Care  []  See progress note/recertification  []  See Discharge Summary         Progress towards goals / Updated goals:  Short Term Goals: STG- To be accomplished in 6 treatment(s):  1.  Pt will be independent with HEP to encourage prophylaxis. Eval:held due to time  Last PN: pt son notes she walks at home approx 76' loop and sometimes can do this 2x  Current: compliance per son report, walking everyday goal MET     2. Pt will be able to perform 5 sit to stands without rest to indicate increased mobility. Eval: able to complete 3  Last PN: MET  performed 5 without rest break. Takes approx 20 sec sit to stand and back to sit consitantly today 5 in row without break 2/6 /20 .     Long Term Goals: LTG- To be accomplished in 16 treatment(s):  1.  Pt will be able to enter/exit clinic with FW RW and walk with step through gait pattern to indicate improved community intergration. Eval:entered with WC, step to gait pattern   Last PN improved dania with RW today wth walking from stepper to chair outside of parallel bars  Current: improving dania with walking with increased step length on left however not step through progressing       2.  Pt will improve 5 time sit to  less than 30 sec Mod I to indicate improved mobility and functional strength.   Eval:5 time sit to stand: able to complete 3 times at 30.3 sec from 20 in with bilateral UE  Last PN:  [facility]  Current: Progressing 20 unsure as to if 30 sec is realistic goal, increased tolerance to sit to stands can complete 5 without stopping from WC in 90.4 sec  Updated to 5 sit to stands from [facility] in less than 1 min Mod I to indicate improved mobility and functional strength  Last PN: : challenged with placing weight through right LE upon standing and increased time with reaching with right hand to RW  Current :  able to complete in 1 min 24 seconds progressing      3.  Pt will improve TUG to 30 sec with FW RW and mod I to indicate improved mobility. Eval:TU.56 sec with FW RW and CGA with step to gait pattern   Last PN: TUG: trial 1: 104 sec with RW, SBA and step through gait, took 20 sec to stand  Trial 2: 96 sec with RW and SBA and step through, took 19 sec to stand  Current: 4 min 3 seconds first  Trial, 2 min 25 sec second trials, 15 seconds sit to stand with increased time with turns   3. Updated: TUG in less than 94 sec with RW W RW and mod I to indicate improved mobility.   Current: 1 min 12 sec with RW, improved step length and transition goal MET       PLAN  []  Upgrade activities as tolerated     [x]  Continue plan of care  []  Update interventions per flow sheet       []  Discharge due to:_  []  Other:_      Blane Ill Remesic 2020  12:35 PM    Future Appointments   Date Time Provider Gayle Valencia   2020 12:30 PM Sunshine Gerard, PT, DPT [facility]   2020  2:30 PM Sudheer Gerardo PT [facility]   2020 12:15 PM Sunshine Gerard PT, DPT [facility]   2020 11:45 AM Sunshine Gerard PT, DPT [facility]

## 2020-02-20 ENCOUNTER — OFFICE VISIT (OUTPATIENT)
Dept: NEUROLOGY | Age: 85
End: 2020-02-20

## 2020-02-20 DIAGNOSIS — R41.3 MEMORY LOSS: Primary | ICD-10-CM

## 2020-02-21 ENCOUNTER — HOSPITAL ENCOUNTER (OUTPATIENT)
Dept: PHYSICAL THERAPY | Age: 85
End: 2020-02-21
Payer: MEDICARE

## 2020-02-21 ENCOUNTER — HOSPITAL ENCOUNTER (OUTPATIENT)
Dept: PHYSICAL THERAPY | Age: 85
Discharge: HOME | End: 2020-02-21
Payer: MEDICARE

## 2020-02-21 PROCEDURE — 97530 THERAPEUTIC ACTIVITIES: CPT

## 2020-02-21 PROCEDURE — 97110 THERAPEUTIC EXERCISES: CPT

## 2020-02-21 NOTE — PROGRESS NOTES
PT DAILY TREATMENT NOTE    Patient Name: Grant Yan  Date:2020  : 1930  [x]  Patient  Verified  Payor: Kathe Linker / Plan: [place] MED[place] PART A [place] / Product Type: Medicare /    In time:1:00  Out time:2:06  Total Treatment Time (min): 66  Total Timed Codes (min): 66  1:1 Treatment Time ( [address] only): 55   Visit #: 25 of 37    Treatment Area: Other abnormalities of gait and mobility [R26.89]    SUBJECTIVE  Pain Level (0-10 scale): 5  Any medication changes, allergies to medications, adverse drug reactions, diagnosis change, or new procedure performed?: [x] No    [] Yes (see summary sheet for update)  Subjective functional status/changes:   [] No changes reported  \"My knees and hip are sore today. \"    OBJECTIVE      31 min Therapeutic Exercise:  [x] See flow sheet :   Rationale: increase ROM and increase strength to improve the patients ability to perform daily activities with decreased pain and symptom levels    35 min Therapeutic Activity:  []  See flow sheet :   Rationale: increase strength, improve coordination, improve balance and increase proprioception  to improve the patients ability to perform daily activities with decreased pain and symptom levels     With   [] TE   [] TA   [] neuro   [] other: Patient Education: [x] Review HEP    [] Progressed/Changed HEP based on:   [] positioning   [] body mechanics   [] transfers   [] heat/ice application    [] other:      Other Objective/Functional Measures:   Increased fatigue with standing activities today  chalenged with reaching for balls across body without UE assist      Pain Level (0-10 scale) post treatment: 7    ASSESSMENT/Changes in Function: Increased fatigue today with very challenged with standing activities for endurance. Hesitant with reaching for baseballs out of bucket without UE assist however able to complete with encouragement and no LOB.      Patient will continue to benefit from skilled PT services to modify and progress therapeutic interventions, address functional mobility deficits, address strength deficits, analyze and cue movement patterns, analyze and modify body mechanics/ergonomics, assess and modify postural abnormalities, address imbalance/dizziness and instruct in home and community integration to attain remaining goals. []  See Plan of Care  []  See progress note/recertification  []  See Discharge Summary         Progress towards goals / Updated goals:  Long Term Goals: LTG- To be accomplished in 16 treatment(s):  1.  Pt will be able to enter/exit clinic with FW RW and walk with step through gait pattern to indicate improved community intergration. Eval:entered with WC, step to gait pattern   Last PNt: improving dania with walking with increased step length on left however not step through   Current:        2.  Pt will improve 5 time sit to  less than 30 sec Mod I to indicate improved mobility and functional strength. Eval:5 time sit to stand: able to complete 3 times at 30.3 sec from 20 in with bilateral UE  Last PN:  Able to perform 5 sit to stands without rest from [facility]  Current: Progressing 20 unsure as to if 30 sec is realistic goal, increased tolerance to sit to stands can complete 5 without stopping from WC in 90.4 sec  Updated to 5 sit to stands from [facility] in less than 1 min Mod I to indicate improved mobility and functional strength  Last PN:  able to complete in 1 min 24 seconds   Current: increased time with sit to stands today due to soreness      3.  Pt will improve TUG to 30 sec with FW RW and mod I to indicate improved mobility. Eval:TU.56 sec with FW RW and CGA with step to gait pattern   Last PN: TUG: trial 1: 104 sec with RW, SBA and step through gait, took 20 sec to stand  Trial 2: 96 sec with RW and SBA and step through, took 19 sec to stand  Current: 4 min 3 seconds first  Trial, 2 min 25 sec second trials, 15 seconds sit to stand with increased time with turns   3. Updated: TUG in less than 94 sec with RW W RW and mod I to indicate improved mobility. LAst PN: 1 min 12 sec with RW, improved step length and transition goal MET      Updated goals 2/19/2020  1. Pt will be able to stand > 10 minutes before fatigue to demonstrate improved standing endurance for ADLs. Last PN: fatigue after 2 minutes of standing exercises in clinic  Current:      2. Pt will be able to complete 8 alternating taps on step with UE assist consecutively to demonstrate improved weight shifting onto right LE.   Last PN:  5 steps on same side with breaks in between, increased time lifting left LE  Current:           PLAN  [x]  Upgrade activities as tolerated     [x]  Continue plan of care  []  Update interventions per flow sheet       []  Discharge due to:_  []  Other:_      Rosa Maria Overton 2/21/2020  1:06 PM    Future Appointments   Date Time Provider Gayle Valencia   2/24/2020  2:30 PM Anai Ch [facility]   2/26/2020 12:15 PM Myla Guzman PT, DPT [facility]   2/28/2020 11:45 AM Myla Guzman PT, DPT [facility]

## 2020-02-24 ENCOUNTER — HOSPITAL ENCOUNTER (OUTPATIENT)
Dept: PHYSICAL THERAPY | Age: 85
Discharge: HOME | End: 2020-02-24
Payer: MEDICARE

## 2020-02-24 PROCEDURE — 97110 THERAPEUTIC EXERCISES: CPT | Performed by: PHYSICAL THERAPIST

## 2020-02-24 PROCEDURE — 97530 THERAPEUTIC ACTIVITIES: CPT | Performed by: PHYSICAL THERAPIST

## 2020-02-24 NOTE — PROGRESS NOTES
PT DAILY TREATMENT NOTE    Patient Name: Jaspreet Wong  Date:2020  : 1930  [x]  Patient  Verified  Payor: Eitan Zavala / Plan: [place] MEDICARE PART A & B / Product Type: Medicare /    In time:1420  Out time:1520  Total Treatment Time (min): 60  Total Timed Codes (min): 52  1:1 Treatment Time ( [address] only): 60   Visit #: 26 of 37    Treatment Area: Other abnormalities of gait and mobility [R26.89]    SUBJECTIVE  Pain Level (0-10 scale): 2  Any medication changes, allergies to medications, adverse drug reactions, diagnosis change, or new procedure performed?: [x] No    [] Yes (see summary sheet for update)  Subjective functional status/changes:   [x] No changes reported  Pt just came from chiropractic appt     OBJECTIVE        25 min Therapeutic Exercise:  [x]? See flow sheet :   Rationale: increase ROM and increase strength to improve the patients ability to perform daily activities with decreased pain and symptom levels     35 min Therapeutic Activity:  []?   See flow sheet :   Rationale: increase strength, improve coordination, improve balance and increase proprioception  to improve the patients ability to perform daily activities with decreased pain and symptom levels    With   [] TE   [] TA   [] neuro   [] other: Patient Education: [x] Review HEP    [] Progressed/Changed HEP based on:   [] positioning   [] body mechanics   [] transfers   [] heat/ice application    [] other:      Other Objective/Functional Measures: see grid      Pain Level (0-10 scale) post treatment: 6    ASSESSMENT/Changes in Function: some hip clicking right hip with abd in sitting at end range if attempts to hold but tolerated against green band if doesn't hold , increase focus on standing with 1 hand hold and weight shift , walking back wards , no adverse affect with therapy     Patient will continue to benefit from skilled PT services to modify and progress therapeutic interventions, address functional mobility deficits, address ROM deficits, address strength deficits, analyze and address soft tissue restrictions, analyze and cue movement patterns, analyze and modify body mechanics/ergonomics, assess and modify postural abnormalities and address imbalance/dizziness to attain remaining goals. [x]  See Plan of Care  []  See progress note/recertification  []  See Discharge Summary         Progress towards goals / Updated goals:  Long Term Goals: LTG- To be accomplished in 16 treatment(s):  1.  Pt will be able to enter/exit clinic with FW RW and walk with step through gait pattern to indicate improved community intergration. Eval:entered with WC, step to gait pattern   Last PNt: improving dania with walking with increased step length on left however not step through   Current:        2.  Pt will improve 5 time sit to  less than 30 sec Mod I to indicate improved mobility and functional strength. Eval:5 time sit to stand: able to complete 3 times at 30.3 sec from 20 in with bilateral UE  Last PN:  Able to perform 5 sit to stands without rest from [facility]  Current: Progressing 20 unsure as to if 30 sec is realistic goal, increased tolerance to sit to stands can complete 5 without stopping from WC in 90.4 sec  Updated to 5 sit to stands from [facility] in less than 1 min Mod I to indicate improved mobility and functional strength  Last PN:  able to complete in 1 min 24 seconds   Current: increased time with sit to stands today due to soreness     3.  Pt will improve TUG to 30 sec with FW RW and mod I to indicate improved mobility. Eval:TU.56 sec with FW RW and CGA with step to gait pattern   Last PN: TUG: trial 1: 104 sec with RW, SBA and step through gait, took 20 sec to stand  Trial 2: 96 sec with RW and SBA and step through, took 19 sec to stand  Current: 4 min 3 seconds first  Trial, 2 min 25 sec second trials, 15 seconds sit to stand with increased time with turns   3. Updated: TUG in less than 94 sec with RW W RW and mod I to indicate improved mobility. LAst PN: 1 min 12 sec with RW, improved step length and transition goal MET      Updated goals 2/19/2020  1. Pt will be able to stand > 10 minutes before fatigue to demonstrate improved standing endurance for ADLs. Last PN: fatigue after 2 minutes of standing exercises in clinic  Current: same     2. Pt will be able to complete 8 alternating taps on step with UE assist consecutively to demonstrate improved weight shifting onto right LE.   Last PN:  5 steps on same side with breaks in between, increased time lifting left LE  Current:     PLAN  [x]  Upgrade activities as tolerated     [x]  Continue plan of care  []  Update interventions per flow sheet       []  Discharge due to:_  []  Other:_      Manohar Miller PT 2/24/2020  6:35 PM    Future Appointments   Date Time Provider Gayle Valencia   2/26/2020 12:15 PM Monico Gutierrez, PT, DPT [facility]   2/28/2020 11:45 AM Monico Gutierrez, PT, DPT [facility]   3/2/2020  2:30 PM Nelson Morgan, PT [facility]   3/4/2020  3:00 PM Nelson Morgan, PT [facility]   3/6/2020  3:15 PM Remesic, Langston Gitelman [facility]   3/9/2020  1:45 PM Nelson Morgan, PT [facility]   3/11/2020 10:45 AM Nelson Morgan PT [facility]   3/13/2020  1:45 PM Remesic, Langston Gitelman [facility]   3/16/2020 11:00 AM Nelson Morgan PT [facility]   3/18/2020  1:00 PM Michael Leroy, PHD [facility]   3/19/2020  9:00 AM Remesic, Langston Gitelman [facility]   3/20/2020 11:00 AM Remesic, Langston Gitelman [facility]   3/23/2020 11:00 AM Nelson Morgan PT [facility]   3/25/2020 10:45 AM Remesic[facility] Lang[facility] Git[facility]   3/27/2020 11:00 AM Remesic, Langston Gitelman [facility]

## 2020-02-26 ENCOUNTER — HOSPITAL ENCOUNTER (OUTPATIENT)
Dept: PHYSICAL THERAPY | Age: 85
Discharge: HOME | End: 2020-02-26
Payer: MEDICARE

## 2020-02-26 PROCEDURE — 97530 THERAPEUTIC ACTIVITIES: CPT

## 2020-02-26 PROCEDURE — 97112 NEUROMUSCULAR REEDUCATION: CPT

## 2020-02-26 NOTE — PROGRESS NOTES
PT DAILY TREATMENT NOTE    Patient Name: Rona Myers  Date:2020  : 1930  [x]  Patient  Verified  Payor: Milla Dear / Plan: [place] MED[place] & [place] / Product Type: Medicare /    In time:12:15 pm  Out time:1:23 pm  Total Treatment Time (min): 60  Total Timed Codes (min): 60  1:1 Treatment Time ([facility] only): 36   Visit #: 27 of 37    Treatment Area: Other abnormalities of gait and mobility [R26.89]    SUBJECTIVE  Pain Level (0-10 scale): 2  Any medication changes, allergies to medications, adverse drug reactions, diagnosis change, or new procedure performed?: [x] No    [] Yes (see summary sheet for update)  Subjective functional status/changes:   [] No changes reported  \"I am here. Ok I guess. \"    OBJECTIVE    Modalities Rationale:     min [] Estim, type/location:                                      []  att     []  unatt     []  w/US     []  w/ice    []  w/heat    min []  Mechanical Traction: type/lbs                   []  pro   []  sup   []  int   []  cont    []  before manual    []  after manual    min []  Ultrasound, settings/location:      min []  Iontophoresis w/ dexamethasone, location:                                               []  take home patch       []  in clinic    min []  Ice     []  Heat    location/position:     min []  Vasopneumatic Device, press/temp:     min []  Other:    [] Skin assessment post-treatment (if applicable):    []  intact    []  redness- no adverse reaction     []redness - adverse reaction:        15 min Therapeutic Exercise:  [x]? See flow sheet :   Rationale: increase ROM, increase strength, improve coordination, improve balance and increase proprioception to improve the patients ability to perform daily activities with decreased pain and symptom levels        15 min Therapeutic Activity:  [x]?   See flow sheet :   Rationale: increase ROM, increase strength, improve coordination, improve balance and increase proprioception  to improve the patients ability to perform daily activities with decreased pain and symptom levels        30 min Neuromuscular Re-education:  [x]? See flow sheet : use of Karen board for WB feedback with WB at [address] and in // bars    Rationale: increase ROM, increase strength, improve coordination, improve balance and increase proprioception  to improve the patients ability to perform daily activities with decreased pain and symptom levels          With   [] TE   [] TA   [] neuro   [] other: Patient Education: [x] Review HEP    [] Progressed/Changed HEP based on:   [] positioning   [] body mechanics   [] transfers   [] heat/ice application    [] other:      Other Objective/Functional Measures:   Very challenged with weight shifting  Able to tolerate toe taps      Pain Level (0-10 scale) post treatment: 7    ASSESSMENT/Changes in Function:   Pt's son reports that noticing increased activity and ease of movement in the kitchen. Increased tolerance to toe taps. Audible clunking at right hip with weight bearing. Patient will continue to benefit from skilled PT services to modify and progress therapeutic interventions, address functional mobility deficits, address ROM deficits, address strength deficits, analyze and address soft tissue restrictions, analyze and cue movement patterns, analyze and modify body mechanics/ergonomics, assess and modify postural abnormalities, address imbalance/dizziness and instruct in home and community integration to attain remaining goals. []  See Plan of Care  []  See progress note/recertification  []  See Discharge Summary         Progress towards goals / Updated goals:  Short Term Goals: STG- To be accomplished in 6 treatment(s):  1.  Pt will be independent with HEP to encourage prophylaxis.   Eval:held due to time  Last PN: pt son notes she walks at home approx 76' loop and sometimes can do this 2x  Current: compliance per son report, walking everyday goal MET     2. Pt will be able to perform 5 sit to stands without rest to indicate increased mobility. Eval: able to complete 3  Last PN: MET  performed 5 without rest break. Takes approx 20 sec sit to stand and back to sit consitantly today 5 in row without break  .     Long Term Goals: LTG- To be accomplished in 16 treatment(s):  1.  Pt will be able to enter/exit clinic with FW RW and walk with step through gait pattern to indicate improved community intergration. Eval:entered with WC, step to gait pattern   Last PN improved dania with RW today wth walking from stepper to chair outside of parallel bars  Current:Progressing 20 increased speed with walking in clinic       2.  Pt will improve 5 time sit to  less than 30 sec Mod I to indicate improved mobility and functional strength. Eval:5 time sit to stand: able to complete 3 times at 30.3 sec from 20 in with bilateral UE  Last PN:  Able to perform 5 sit to stands without rest from [facility]  Current: Progressing 20 unsure as to if 30 sec is realistic goal, increased tolerance to sit to stands can complete 5 without stopping from WC in 90.4 sec        2. Updated to 5 sit to stands from [facility] in less than 1 min Mod I to indicate improved mobility and functional strength  Last PN: : challenged with placing weight through right LE upon standing and increased time with reaching with right hand to RW  Current :  able to complete in 1 min 24 seconds today progressing 20     3.  Pt will improve TUG to 30 sec with FW RW and mod I to indicate improved mobility. Eval:TU.56 sec with FW RW and CGA with step to gait pattern   Last PN: TUG: trial 1: 104 sec with RW, SBA and step through gait, took 20 sec to stand  Trial 2: 96 sec with RW and SBA and step through, took 19 sec to stand  Current: 4 min 3 seconds first  Trial, 2 min 25 sec second trials, 15 seconds sit to stand with increased time with turns   3. Updated: TUG in less than 94 sec with RW W RW and mod I to indicate improved mobility.   Current: 2/14/20 not officially addressed but noted improved gait speed - challenged to have step through gait with walker vs step tp       PLAN  [x]  Upgrade activities as tolerated     []  Continue plan of care  []  Update interventions per flow sheet       []  Discharge due to:_  []  Other:_      Wander Lyo, PT, DPT 2/26/2020  1:29 PM    Future Appointments   Date Time Provider Gayle Valencia   2/28/2020 11:45 AM Lillian Allen, PT, DPT [facility]   3/2/2020  2:30 PM Ronny Ng, PT [facility]   3/4/2020  3:00 PM Ronny Ng, PT [facility]   3/6/2020  3:15 PM Remesic, Carmina Delvis [facility]   3/9/2020  1:45 PM Ronny Ng, PT [facility]   3/11/2020 10:45 AM Ronny Ng, PT [facility]   3/13/2020  1:45 PM Remesic, Carmina Delvis [facility]   3/16/2020 11:00 AM oRnny Ng, PT [facility]   3/18/2020  1:00 PM Alexey Khan, PHD [address]   3/19/2020  9:00 AM Remesic, Carmina Delvis [facility] [place]   3/20/2020 11:00 AM Remesic, Carmina Delvis [facility]   3/23/2020 11:00 AM Ronny Ng, PT [facility]   3/25/2020 10:45 AM Remesic, Carmina Delvis [facility]   3/27/2020 11:00 AM Remesic, Carmina Delvis [facility]

## 2020-02-28 ENCOUNTER — HOSPITAL ENCOUNTER (OUTPATIENT)
Dept: PHYSICAL THERAPY | Age: 85
Discharge: HOME | End: 2020-02-28
Payer: MEDICARE

## 2020-02-28 PROCEDURE — 97112 NEUROMUSCULAR REEDUCATION: CPT

## 2020-02-28 PROCEDURE — 97530 THERAPEUTIC ACTIVITIES: CPT

## 2020-02-28 NOTE — PROGRESS NOTES
PT DAILY TREATMENT NOTE    Patient Name: Georgia Rivera  Date:2020  : 1930  [x]  Patient  Verified  Payor: Swetha Cabrera / Plan: [place] MEDICARE PART A & B / Product Type: Medicare /    In time:11:45 am  Out time:12:45 pm   Total Treatment Time (min): 60  Total Timed Codes (min): 60  1:1 Treatment Time ( [address] only): 40   Visit #: 28 of 37    Treatment Area: Other abnormalities of gait and mobility [R26.89]    SUBJECTIVE  Pain Level (0-10 scale): 2  Any medication changes, allergies to medications, adverse drug reactions, diagnosis change, or new procedure performed?: [x] No    [] Yes (see summary sheet for update)  Subjective functional status/changes:   [] No changes reported  \"My right hip. \"  Reports that just came from chiropractor    OBJECTIVE    Modalities Rationale:      min [] Estim, type/location:                                      []  att     []  unatt     []  w/US     []  w/ice    []  w/heat    min []  Mechanical Traction: type/lbs                   []  pro   []  sup   []  int   []  cont    []  before manual    []  after manual    min []  Ultrasound, settings/location:      min []  Iontophoresis w/ dexamethasone, location:                                               []  take home patch       []  in clinic    min []  Ice     []  Heat    location/position:     min []  Vasopneumatic Device, press/temp:     min []  Other:    [] Skin assessment post-treatment (if applicable):    []  intact    []  redness- no adverse reaction     []redness - adverse reaction:          15 min Therapeutic Exercise:  [x] See flow sheet :   Rationale: increase ROM, increase strength, improve coordination, improve balance and increase proprioception to improve the patients ability to perform daily activities with decreased pain and symptom levels      30 min Therapeutic Activity:  [x]  See flow sheet :   Rationale: increase ROM, increase strength, improve coordination, improve balance and increase proprioception  to improve the patients ability to perform daily activities with decreased pain and symptom levels       15 min Neuromuscular Re-education:  [x]  See flow sheet :   Rationale: increase ROM, increase strength, improve coordination, improve balance and increase proprioception  to improve the patients ability to perform daily activities with decreased pain and symptom levels            With   [] TE   [] TA   [] neuro   [] other: Patient Education: [x] Review HEP    [] Progressed/Changed HEP based on:   [] positioning   [] body mechanics   [] transfers   [] heat/ice application    [] other:      Other Objective/Functional Measures:   Gait: 75 ft, 35 ft, 60 ft with RW,SBA,min A to move walker  Able to take 4 reciprocal steps in // bars     Pain Level (0-10 scale) post treatment: 7    ASSESSMENT/Changes in Function:   Focussed treatment session on gait and trying to correct shuffle steps with RW with goal toward reciprocal gait with RW. Patient will continue to benefit from skilled PT services to modify and progress therapeutic interventions, address functional mobility deficits, address ROM deficits, address strength deficits, analyze and address soft tissue restrictions, analyze and cue movement patterns, analyze and modify body mechanics/ergonomics, assess and modify postural abnormalities, address imbalance/dizziness and instruct in home and community integration to attain remaining goals. []  See Plan of Care  []  See progress note/recertification  []  See Discharge Summary         Progress towards goals / Updated goals:  Short Term Goals: STG- To be accomplished in 6 treatment(s):  1.  Pt will be independent with HEP to encourage prophylaxis. Eval:held due to time  Last PN: pt son notes she walks at home approx 76' loop and sometimes can do this 2x  Current: compliance per son report, walking everyday goal MET     2. Pt will be able to perform 5 sit to stands without rest to indicate increased mobility. Eval: able to complete 3  Last PN: MET  performed 5 without rest break. Takes approx 20 sec sit to stand and back to sit consitantly today 5 in row without break  .     Long Term Goals: LTG- To be accomplished in 16 treatment(s):  1.  Pt will be able to enter/exit clinic with FW RW and walk with step through gait pattern to indicate improved community intergration. Eval:entered with WC, step to gait pattern   Last PN improved dania with RW today wth walking from stepper to chair outside of parallel bars  Current:Progressing 20 increased speed with walking in clinic, walked from gym to        2.  Pt will improve 5 time sit to  less than 30 sec Mod I to indicate improved mobility and functional strength. Eval:5 time sit to stand: able to complete 3 times at 30.3 sec from 20 in with bilateral UE  Last PN:  Able to perform 5 sit to stands without rest from [facility]  Current: Progressing 20 unsure as to if 30 sec is realistic goal, increased tolerance to sit to stands can complete 5 without stopping from WC in 90.4 sec        2. Updated to 5 sit to stands from [facility] in less than 1 min Mod I to indicate improved mobility and functional strength  Last PN: : challenged with placing weight through right LE upon standing and increased time with reaching with right hand to RW  Current :  able to complete in 1 min 24 seconds today progressing 20     3.  Pt will improve TUG to 30 sec with FW RW and mod I to indicate improved mobility. Eval:TU.56 sec with FW RW and CGA with step to gait pattern   Last PN: TUG: trial 1: 104 sec with RW, SBA and step through gait, took 20 sec to stand  Trial 2: 96 sec with RW and SBA and step through, took 19 sec to stand  Current: 4 min 3 seconds first  Trial, 2 min 25 sec second trials, 15 seconds sit to stand with increased time with turns   3. Updated: TUG in less than 94 sec with RW W RW and mod I to indicate improved mobility.   Current: 20 not officially addressed but noted improved gait speed - challenged to have step through gait with walker vs step tap    PLAN  [x]  Upgrade activities as tolerated     []  Continue plan of care  []  Update interventions per flow sheet       []  Discharge due to:_  []  Other:_      Aiden Stewart, PT, DPT 2/28/2020  12:12 PM    Future Appointments   Date Time Provider Gayle Valencia   3/2/2020  2:30 PM Daniele Hollis, PT [facility]   3/4/2020  3:00 PM Daniele Hollis, PT [facility]   3/6/2020  3:15 PM Remesic, Mari Rich [facility]   3/9/2020  1:45 PM Daniele Hollis, PT [facility]   3/11/2020 10:45 AM Daniele Hollis, PT [facility]   3/13/2020  1:45 PM Remesic, Mari Rich [facility]   3/16/2020 11:00 AM Daniele Hollis, PT [facility]   3/18/2020  1:00 PM Whitney Moon, PHD [address]   3/19/2020  9:00 AM Remesic, Mari Vipul [facility]   3/20/2020 11:00 AM Remesic, Mari Rich [facility]   3/23/2020 11:00 AM Daniele Hollis, PT [facility]   3/25/2020 10:45 AM Remesic, Mari Rich [facility]   3/27/2020 11:00 AM Remesic, Mari Rich [facility]

## 2020-03-02 ENCOUNTER — HOSPITAL ENCOUNTER (OUTPATIENT)
Dept: PHYSICAL THERAPY | Age: 85
Discharge: HOME | End: 2020-03-02
Payer: MEDICARE

## 2020-03-02 PROCEDURE — 97530 THERAPEUTIC ACTIVITIES: CPT

## 2020-03-02 PROCEDURE — 97110 THERAPEUTIC EXERCISES: CPT

## 2020-03-02 NOTE — PROGRESS NOTES
PT DAILY TREATMENT NOTE    Patient Name: Jazmin Mccollum  Date:3/2/2020  : 1930  [x]  Patient  Verified  Payor: Ana Soliman / Plan: [place] MEDICARE PART A & B / Product Type: Medicare /    In time:227  Out time:325  Total Treatment Time (min): 58  Total Timed Codes (min): 58  1:1 Treatment Time ( [address] only): 45    Visit #: 29 of 37    Treatment Area:  Other abnormalities of gait and mobility [R26.89]    SUBJECTIVE  Pain Level (0-10 scale): 4  Any medication changes, allergies to medications, adverse drug reactions, diagnosis change, or new procedure performed?: [x] No    [] Yes (see summary sheet for update)  Subjective functional status/changes:   [] No changes reported  Patient son reports regular lap walking at home 5-6x daily    OBJECTIVE        20 min Therapeutic Exercise:  [x] See flow sheet :   Rationale: increase ROM, increase strength, improve coordination, improve balance and increase proprioception to improve the patients ability to improved household ambulation    38 min Therapeutic Activity:  [x]  See flow sheet :gait training with RW for 180 feet with multiple rest periods, worked on balance activities, lat steps and improved reciprocal gait   Rationale: improve coordination, improve balance and increase proprioception  to improve the patients ability to return to PLOF         With   [] TE   [x] TA   [] neuro   [] other: Patient Education: [x] Review HEP    [] Progressed/Changed HEP based on:   [] positioning   [] body mechanics   [] transfers   [] heat/ice application    [] other:      Other Objective/Functional Measures: intermittent sharp pain right hip causing WB difficulty  Needs VC for proper foot placement     Pain Level (0-10 scale) post treatment: 4    ASSESSMENT/Changes in Function: ambulation at times limited by right hip pain due to abnormal alignment and advanced OA    Patient will continue to benefit from skilled PT services to address strength deficits, analyze and address soft tissue restrictions, analyze and cue movement patterns and assess and modify postural abnormalities to attain remaining goals. []  See Plan of Care  []  See progress note/recertification  []  See Discharge Summary           Progress towards goals / Updated goals:  Short Term Goals: STG- To be accomplished in 6 treatment(s):  1.  Pt will be independent with HEP to encourage prophylaxis. Eval:held due to time  Last PN: pt son notes she walks at home approx 76' loop and sometimes can do this 2x  Current: compliance per son report, walking everyday goal MET     2. Pt will be able to perform 5 sit to stands without rest to indicate increased mobility. Eval: able to complete 3  Last PN: MET  performed 5 without rest break. Takes approx 20 sec sit to stand and back to sit consitantly today 5 in row without break 2/6 /20 .     Long Term Goals: LTG- To be accomplished in 16 treatment(s):  1.  Pt will be able to enter/exit clinic with FW RW and walk with step through gait pattern to indicate improved community intergration. Eval:entered with WC, step to gait pattern   Last PN improved dania with RW today wth walking from stepper to chair outside of parallel bars  Current:Progressing 2/28/20 increased speed with walking in clinic, walked from gym to   Status on 3/2/20: increased distance with ambulation (180 feet) with slow dania overall, continue with step to gait pattern, progressing       2.  Pt will improve 5 time sit to  less than 30 sec Mod I to indicate improved mobility and functional strength.   Eval:5 time sit to stand: able to complete 3 times at 30.3 sec from 20 in with bilateral UE  Last PN:  Able to perform 5 sit to stands without rest from [facility]  Current: Progressing 1/30/20 unsure as to if 30 sec is realistic goal, increased tolerance to sit to stands can complete 5 without stopping from WC in 90.4 sec        2. Updated to 5 sit to stands from [facility] in less than 1 min Mod I to indicate improved mobility and functional strength  Last PN: : challenged with placing weight through right LE upon standing and increased time with reaching with right hand to RW  Current :  able to complete in 1 min 24 seconds today progressing 20     3.  Pt will improve TUG to 30 sec with FW RW and mod I to indicate improved mobility. Eval:TU.56 sec with FW RW and CGA with step to gait pattern   Last PN: TUG: trial 1: 104 sec with RW, SBA and step through gait, took 20 sec to stand  Trial 2: 96 sec with RW and SBA and step through, took 19 sec to stand  Current: 4 min 3 seconds first  Trial, 2 min 25 sec second trials, 15 seconds sit to stand with increased time with turns     3. Updated: TUG in less than 94 sec with RW W RW and mod I to indicate improved mobility.   Current: 20 not officially addressed but noted improved gait speed - challenged to have step through gait with walker vs step tap       PLAN  []  Upgrade activities as tolerated     [x]  Continue plan of care  []  Update interventions per flow sheet       []  Discharge due to:_  []  Other:_      Kirill Mckeon, PT 3/2/2020  2:35 PM    Future Appointments   Date Time Provider Gayle Valencia   3/4/2020  3:00 PM Ragini Salgado, PT [facility]   3/6/2020  3:15 PM [place] Tasneem Cornea [facility]   3/9/2020  1:45 PM Ragini Salgado, PT [facility]   3/11/2020 10:45 AM Ragini Humbles, PT [facility]   3/13/2020  1:45 PM Remesic, Tasneem Cornea [facility]   3/16/2020 11:00 AM Ragini Salgado, PT [facility]   3/18/2020  1:00 PM Skye Hammonds, PHD [address]   3/19/2020  9:00 AM Remesic, Tasneem Cornea [facility]   3/20/2020 11:00 AM Remesic, Tasneem Cornea [facility]   3/23/2020 11:00 AM [facility] PT [place] [facility]   3/25/2020 10:45 AM Tasneem Overton Cornea [facility]   3/27/2020 11:00 AM Tasneem Overton Cornea [facility]

## 2020-03-04 ENCOUNTER — HOSPITAL ENCOUNTER (OUTPATIENT)
Dept: PHYSICAL THERAPY | Age: 85
Discharge: HOME | End: 2020-03-04
Payer: MEDICARE

## 2020-03-04 PROCEDURE — 97110 THERAPEUTIC EXERCISES: CPT

## 2020-03-04 PROCEDURE — 97530 THERAPEUTIC ACTIVITIES: CPT

## 2020-03-04 NOTE — PROGRESS NOTES
PT DAILY TREATMENT NOTE    Patient Name: Krys Mackay  Date:3/4/2020  : 1930  [x]  Patient  Verified  Payor: Linder Cheadle / Plan: [place] / Product Type: Medicare /    In time:300  Out time:350  Total Treatment Time (min): 50  Total Timed Codes (min): 50  1:1 Treatment Time ([facility] only): 50    Visit #: 30 of 37    Treatment Area:  Other abnormalities of gait and mobility [R26.89]    SUBJECTIVE  Pain Level (0-10 scale): 0  Any medication changes, allergies to medications, adverse drug reactions, diagnosis change, or new procedure performed?: [x] No    [] Yes (see summary sheet for update)  Subjective functional status/changes:   [x] No changes reported      OBJECTIVE      15 min Therapeutic Exercise:  [x] See flow sheet :   Rationale: increase ROM and increase strength to improve the patients ability to return to PLOF    35 min Therapeutic Activity:  [x]  See flow sheet :WS activities in seated and standing to increase right leg WB and muscle activation, attempted left lateral step up to 1.5\" box x1 with poor form, ambulation with RW for total of 120' with CGA and VC for foot placement,    Rationale: increase ROM, increase strength, improve coordination, improve balance, increase proprioception and stability  to improve the patients ability to increase tolerance to basic ADL                 With   [] TE   [x] TA   [] neuro   [] other: Patient Education: [x] Review HEP    [] Progressed/Changed HEP based on:   [] positioning   [] body mechanics   [] transfers   [] heat/ice application    [] other:      Other Objective/Functional Measures: marked right hip instability and crepitation causing difficulty with all right leg WB     Pain Level (0-10 scale) post treatment: 7 right hip    ASSESSMENT/Changes in Function: increased walking distance, patient did well with RW ambulation, poor performance with parallel bar activities    Patient will continue to benefit from skilled PT services to address ROM deficits, address strength deficits, analyze and cue movement patterns and assess and modify postural abnormalities to attain remaining goals. [x]  See Plan of Care  []  See progress note/recertification  []  See Discharge Summary           Progress towards goals / Updated goals:  Short Term Goals: STG- To be accomplished in 6 treatment(s):  1.  Pt will be independent with HEP to encourage prophylaxis. Eval:held due to time  Last PN: pt son notes she walks at home approx 76' loop and sometimes can do this 2x  Current: compliance per son report, walking everyday goal MET     2. Pt will be able to perform 5 sit to stands without rest to indicate increased mobility. Eval: able to complete 3  Last PN: MET  performed 5 without rest break. Takes approx 20 sec sit to stand and back to sit consitantly today 5 in row without break 2/6 /20 .     Long Term Goals: LTG- To be accomplished in 16 treatment(s):  1.  Pt will be able to enter/exit clinic with FW RW and walk with step through gait pattern to indicate improved community intergration. Eval:entered with WC, step to gait pattern   Last PN improved dania with RW today wth walking from stepper to chair outside of parallel bars  Current:Progressing 2/28/20 increased speed with walking in clinic, walked from gym to   Status on 3/2/20: increased distance with ambulation (180 feet) with slow dania overall, continue with step to gait pattern, progressing       2.  Pt will improve 5 time sit to  less than 30 sec Mod I to indicate improved mobility and functional strength.   Eval:5 time sit to stand: able to complete 3 times at 30.3 sec from 20 in with bilateral UE  Last PN:  Able to perform 5 sit to stands without rest from [facility]  Current: Progressing 1/30/20 unsure as to if 30 sec is realistic goal, increased tolerance to sit to stands can complete 5 without stopping from WC in 90.4 sec        2. Updated to 5 sit to stands from [facility] in less than 1 min Mod I to indicate improved mobility and functional strength  Last PN: : challenged with placing weight through right LE upon standing and increased time with reaching with right hand to RW  Current :  able to complete in 1 min 24 seconds today progressing 20     3.  Pt will improve TUG to 30 sec with FW RW and mod I to indicate improved mobility. Eval:TU.56 sec with FW RW and CGA with step to gait pattern   Last PN: TUG: trial 1: 104 sec with RW, SBA and step through gait, took 20 sec to stand  Trial 2: 96 sec with RW and SBA and step through, took 19 sec to stand  Current: 4 min 3 seconds first  Trial, 2 min 25 sec second trials, 15 seconds sit to stand with increased time with turns      3.Updated: TUG in less than 94 sec with RW W RW and mod I to indicate improved mobility.   Current: 20 not officially addressed but noted improved gait speed - challenged to have step through gait with walker vs step tap       PLAN  []  Upgrade activities as tolerated     [x]  Continue plan of care  []  Update interventions per flow sheet       []  Discharge due to:_  []  Other:_      Cynthia Valdivia PT 3/4/2020  3:32 PM    Future Appointments   Date Time Provider Gayle Valencia   3/6/2020  3:15 PM Remesic, Tula Riedel MIHPTBW [facility]   3/9/2020  1:45 PM MANDY SmithHPANN THE [facility]   3/11/2020 10:45 AM MANDY SmithHPANN THE [facility]   3/13/2020  1:45 PM Remesic, Tula Riedel MIHPTBW [facility]   3/16/2020 11:00 AM MANDY SmithHPANN THE [facility]   3/18/2020  1:00 PM Jeri Campos, PHD Sarika Saldivar   3/19/2020  9:00 AM Remesic, Tula Riedel MIHPTBW [facility]   3/20/2020 11:00 AM Remesic, Tula Riedel MIHPTBW [facility]   3/23/2020 11:00 AM Audrey STRONG THE [facility]   3/25/2020 10:45 AM [facility]ic[facility]   3/27/2020 11:00 AM Remesic, Tula Riedel MIHPTBW [facility]

## 2020-03-06 ENCOUNTER — HOSPITAL ENCOUNTER (OUTPATIENT)
Dept: PHYSICAL THERAPY | Age: 85
Discharge: HOME | End: 2020-03-06
Payer: MEDICARE

## 2020-03-06 PROCEDURE — 97110 THERAPEUTIC EXERCISES: CPT

## 2020-03-06 PROCEDURE — 97530 THERAPEUTIC ACTIVITIES: CPT

## 2020-03-06 NOTE — PROGRESS NOTES
PT DAILY TREATMENT NOTE    Patient Name: Nicolás Mendieta  Date:3/6/2020  : 1930  [x]  Patient  Verified  Payor: [place] MEDICARE / Plan: [place] MEDICARE PART A & B / Product Type: Medicare /    In time:1:00  Out time:2:00  Total Treatment Time (min): 60  Total Timed Codes (min): 60  1:1 Treatment Time ( [address] only): 50   Visit #: 31 of 37    Treatment Area: Other abnormalities of gait and mobility [R26.89]    SUBJECTIVE  Pain Level (0-10 scale): 6  Any medication changes, allergies to medications, adverse drug reactions, diagnosis change, or new procedure performed?: [x] No    [] Yes (see summary sheet for update)  Subjective functional status/changes:   [] No changes reported  \"My hip. \"    OBJECTIVE    25 min Therapeutic Exercise:  [x] See flow sheet :   Rationale: increase ROM and increase strength to improve the patients ability to perform daily activities with decreased pain and symptom levels    35 min Therapeutic Activity:  [x]  See flow sheet :   Rationale: improve coordination, improve balance and increase proprioception  to improve the patients ability to perform daily activities with decreased pain and symptom levels           With   [] TE   [] TA   [] neuro   [] other: Patient Education: [x] Review HEP    [] Progressed/Changed HEP based on:   [] positioning   [] body mechanics   [] transfers   [] heat/ice application    [] other:      Other Objective/Functional Measures:   5x sit to stand 41seconds  Mod A for RW with walking from parallel bars to front door, able to complete 2-3 step through steps before stopping    Pain Level (0-10 scale) post treatment: 6    ASSESSMENT/Changes in Function: Pt with improved ability to complete sit to stand transfers consecutively today with meeting 5x sit to stand goal today. Able to complete step taps on cones and step over small hurdles today with RW. Pt able to complete step through pattern with RW with mod A for RW propulsion and significant verbal cues.      Patient will continue to benefit from skilled PT services to modify and progress therapeutic interventions, address functional mobility deficits, address strength deficits, analyze and cue movement patterns, analyze and modify body mechanics/ergonomics, assess and modify postural abnormalities, address imbalance/dizziness and instruct in home and community integration to attain remaining goals. []  See Plan of Care  []  See progress note/recertification  []  See Discharge Summary         Progress towards goals / Updated goals:  Short Term Goals: STG- To be accomplished in 6 treatment(s):  1.  Pt will be independent with HEP to encourage prophylaxis. Eval:held due to time  Last PN: pt son notes she walks at home approx 76' loop and sometimes can do this 2x  Current: compliance per son report, walking everyday goal MET     2. Pt will be able to perform 5 sit to stands without rest to indicate increased mobility. Eval: able to complete 3  Last PN: MET  performed 5 without rest break. Takes approx 20 sec sit to stand and back to sit consitantly today 5 in row without break 2/6 /20 .     Long Term Goals: LTG- To be accomplished in 16 treatment(s):  1.  Pt will be able to enter/exit clinic with FW RW and walk with step through gait pattern to indicate improved community intergration. Eval:entered with WC, step to gait pattern   Last PN improved dania with RW today wth walking from stepper to chair outside of parallel bars  Current:Progressing 2/28/20 increased speed with walking in clinic, walked from gym to   Status on 3/2/20: increased distance with ambulation (180 feet) with slow dania overall, continue with step to gait pattern, progressing       2.  Pt will improve 5 time sit to  less than 30 sec Mod I to indicate improved mobility and functional strength.   Eval:5 time sit to stand: able to complete 3 times at 30.3 sec from 20 in with bilateral UE  Last PN:  Able to perform 5 sit to stands without rest from [facility]  Current: Progressing 20 unsure as to if 30 sec is realistic goal, increased tolerance to sit to stands can complete 5 without stopping from WC in 90.4 sec   2. Updated to 5 sit to stands from [facility] in less than 1 min Mod I to indicate improved mobility and functional strength  Last PN:  able to complete in 1 min 24 seconds today   Current: 41 secs with improved ability to transition between reps goal MET     3.  Pt will improve TUG to 30 sec with FW RW and mod I to indicate improved mobility. Eval:TU.56 sec with FW RW and CGA with step to gait pattern   Last PN: TUG: trial 1: 104 sec with RW, SBA and step through gait, took 20 sec to stand  Trial 2: 96 sec with RW and SBA and step through, took 19 sec to stand  Current: 4 min 3 seconds first  Trial, 2 min 25 sec second trials, 15 seconds sit to stand with increased time with turns   3. Updated: TUG in less than 94 sec with RW W RW and mod I to indicate improved mobility.   Current: 20 not officially addressed but noted improved gait speed - challenged to have step through gait with walker vs step tap       PLAN  [x]  Upgrade activities as tolerated     [x]  Continue plan of care  []  Update interventions per flow sheet       []  Discharge due to:_  []  Other:_      Effie Encarnacion 3/6/2020  1:11 PM    Future Appointments   Date Time Provider Gayle Valencia   3/9/2020  1:45 PM Miri Marks PT [facility]IG[facility]   3/11/2020 10:45 AM Miri Marks PT [facility]   3/13/2020  1:45 PM Hugo Overton THE [facility]   3/16/2020 11:00 AM Miri Marks PT [facility]   3/18/2020  1:00 PM Alon Vazquez, PHD [address]   3/19/2020  9:00 AM Hugo Overton [facility]   3/20/2020 11:00 AM Hugo Overton [facility]   3/23/2020 11:00 AM [facility]   3/25/2020 10:45 AM Hugo OvertonHPANN THE [facility]   3/27/2020 11:00 AM Hugo OvertonHPANN THE [facility]

## 2020-03-09 ENCOUNTER — HOSPITAL ENCOUNTER (OUTPATIENT)
Dept: PHYSICAL THERAPY | Age: 85
Discharge: HOME | End: 2020-03-09
Payer: MEDICARE

## 2020-03-09 PROCEDURE — 97112 NEUROMUSCULAR REEDUCATION: CPT

## 2020-03-09 PROCEDURE — 97530 THERAPEUTIC ACTIVITIES: CPT

## 2020-03-09 NOTE — PROGRESS NOTES
PT DAILY TREATMENT NOTE    Patient Name: Grant Yan  Date:3/9/2020  : 1930  [x]  Patient  Verified  Payor: Kathe Linker / Plan: [place] MEDICARE PART A & B / Product Type: Medicare /    In time:1:48 pm  Out time:2:59 pm  Total Treatment Time (min): 65 (waiting on PT)  Total Timed Codes (min): 65  1:1 Treatment Time ( [address] only): 45   Visit #: 32 of 37    Treatment Area: Other abnormalities of gait and mobility [R26.89]    SUBJECTIVE  Pain Level (0-10 scale): 6  Any medication changes, allergies to medications, adverse drug reactions, diagnosis change, or new procedure performed?: [x] No    [] Yes (see summary sheet for update)  Subjective functional status/changes:   [] No changes reported  \"I am sore in my hips. \"    OBJECTIVE    Modalities Rationale:       min [] Estim, type/location:                                      []  att     []  unatt     []  w/US     []  w/ice    []  w/heat    min []  Mechanical Traction: type/lbs                   []  pro   []  sup   []  int   []  cont    []  before manual    []  after manual    min []  Ultrasound, settings/location:      min []  Iontophoresis w/ dexamethasone, location:                                               []  take home patch       []  in clinic    min []  Ice     []  Heat    location/position:     min []  Vasopneumatic Device, press/temp:     min []  Other:    [] Skin assessment post-treatment (if applicable):    []  intact    []  redness- no adverse reaction     []redness - adverse reaction:          15 min Therapeutic Exercise:  [x] See flow sheet :   Rationale: increase ROM, increase strength, improve coordination, improve balance and increase proprioception to improve the patients ability to perform daily activities with decreased pain and symptom levels      30 min Therapeutic Activity:  [x]  See flow sheet :included gait/walking and education andassistancewithRWadvancement   Rationale: increase ROM, increase strength, improve coordination, improve balance and increase proprioception  to improve the patients ability to perform daily activities with decreased pain and symptom levels       20 min Neuromuscular Re-education:  [x]  See flow sheet :   Rationale: increase ROM, increase strength, improve coordination, improve balance and increase proprioception  to improve the patients ability to perform daily activities with decreased pain and symptom levels            With   [] TE   [] TA   [] neuro   [] other: Patient Education: [x] Review HEP    [] Progressed/Changed HEP based on:   [] positioning   [] body mechanics   [] transfers   [] heat/ice application    [] other:      Other Objective/Functional Measures:   Gait: 75 ft and ~65 ft with RW and Mod A for RW advancement      Pain Level (0-10 scale) post treatment: 8    ASSESSMENT/Changes in Function:   Increased audible and palpable adjustment at right LE and hip with weight bearing. Pt very apprehensive with WB on right LE, increased WB through left LE on // bars with Left LE advancement. Provided Mod A for RW advancement to encourage reciprocal gait. Noted decreased adjustments with increased gait speed and improved reciprocal gait. Pt required max cues for WB and encouragement. Patient will continue to benefit from skilled PT services to modify and progress therapeutic interventions, address functional mobility deficits, address ROM deficits, address strength deficits, analyze and address soft tissue restrictions, analyze and cue movement patterns, analyze and modify body mechanics/ergonomics, assess and modify postural abnormalities, address imbalance/dizziness and instruct in home and community integration to attain remaining goals.      []  See Plan of Care  []  See progress note/recertification  []  See Discharge Summary         Progress towards goals / Updated goals:  Short Term Goals: STG- To be accomplished in 6 treatment(s):  1.  Pt will be independent with HEP to encourage prophylaxis. Eval:held due to time  Last PN: pt son notes she walks at home approx 76' loop and sometimes can do this 2x  Current: compliance per son report, walking everyday goal MET     2. Pt will be able to perform 5 sit to stands without rest to indicate increased mobility. Eval: able to complete 3  Last PN: MET  performed 5 without rest break. Takes approx 20 sec sit to stand and back to sit consitantly today 5 in row without break  .     Long Term Goals: LTG- To be accomplished in 16 treatment(s):  1.  Pt will be able to enter/exit clinic with FW RW and walk with step through gait pattern to indicate improved community intergration. Eval:entered with WC, step to gait pattern   Last PN improved dania with RW today wth walking from stepper to chair outside of parallel bars  Current:Progressing 20 increased speed with walking in clinic, walked from gym to   Status on 3/2/20: increased distance with ambulation (180 feet) with slow dania overall, continue with step to gait pattern, progressing       2.  Pt will improve 5 time sit to  less than 30 sec Mod I to indicate improved mobility and functional strength. Eval:5 time sit to stand: able to complete 3 times at 30.3 sec from 20 in with bilateral UE  Last PN:  Able to perform 5 sit to stands without rest from [facility]  Current: Progressing 20 unsure as to if 30 sec is realistic goal, increased tolerance to sit to stands can complete 5 without stopping from WC in 90.4 sec   2. Updated to 5 sit to stands from [facility] in less than 1 min Mod I to indicate improved mobility and functional strength  Last PN:  able to complete in 1 min 24 seconds today   Current: 41 secs with improved ability to transition between reps goal MET     3.  Pt will improve TUG to 30 sec with FW RW and mod I to indicate improved mobility.   Eval:TU.56 sec with FW RW and CGA with step to gait pattern   Last PN: TUG: trial 1: 104 sec with RW, SBA and step through gait, took 20 sec to stand  Trial 2: 96 sec with RW and SBA and step through, took 19 sec to stand  Current: 4 min 3 seconds first  Trial, 2 min 25 sec second trials, 15 seconds sit to stand with increased time with turns   3. Updated: TUG in less than 94 sec with RW W RW and mod I to indicate improved mobility.   Current: 2/14/20 not officially addressed but noted improved gait speed - challenged to have step through gait with walker vs step tap    PLAN  [x]  Upgrade activities as tolerated     []  Continue plan of care  []  Update interventions per flow sheet       []  Discharge due to:_  []  Other:_      Juan Manuel Guadarrama PT, DPT 3/9/2020  2:26 PM    Future Appointments   Date Time Provider Gayle Valencia   3/11/2020 10:45 AM Hiral Ventura PT [facility]PTBW [facility]   3/13/2020  1:45 PM Lavinia Overton [facility]PT[facility]ANY [facility]   3/16/2020 11:00 AM Hiral Ventura PT [facility]PT[facility]   3/18/2020  1:00 PM Sara Butler, PHD [address]   3/19/2020  9:00 AM Lavinia Overton [facility]PTBW [facility]   3/20/2020 11:00 AM Lavinia Overton [facility]PTBW [facility]   3/23/2020 11:00 AM Desmond Snellen MIHPTBW [facility]   3/25/2020 10:45 AM Lavinia Overton [facility]   3/27/2020 11:00 AM Lavinia Overton MI[facility]PTBW [facility]

## 2020-03-11 ENCOUNTER — HOSPITAL ENCOUNTER (OUTPATIENT)
Dept: PHYSICAL THERAPY | Age: 85
Discharge: HOME | End: 2020-03-11
Payer: MEDICARE

## 2020-03-11 PROCEDURE — 97530 THERAPEUTIC ACTIVITIES: CPT

## 2020-03-11 PROCEDURE — 97110 THERAPEUTIC EXERCISES: CPT

## 2020-03-11 NOTE — PROGRESS NOTES
PT DAILY TREATMENT NOTE    Patient Name: Syeda Chapin  Date:3/11/2020  : 1930  [x]  Patient  Verified  Payor: Leila Harrington / Plan: [place] / Product Type: Medicare /    In time:10:50  Out time:11:50  Total Treatment Time (min): 60  Total Timed Codes (min): 60  1:1 Treatment Time ( [address] only): 50   Visit #: 33 of 37    Treatment Area: Other abnormalities of gait and mobility [R26.89]    SUBJECTIVE  Pain Level (0-10 scale): 7  Any medication changes, allergies to medications, adverse drug reactions, diagnosis change, or new procedure performed?: [x] No    [] Yes (see summary sheet for update)  Subjective functional status/changes:   [] No changes reported  \"My hip. \"    OBJECTIVE    30 min Therapeutic Exercise:  [x] See flow sheet :   Rationale: increase ROM and increase strength to improve the patients ability to perform daily activities with decreased pain and symptom levels    30 min Therapeutic Activity:  [x]  See flow sheet :   Rationale: improve coordination, improve balance and increase proprioception  to improve the patients ability to perform daily activities with decreased pain and symptom levels           With   [] TE   [] TA   [] neuro   [] other: Patient Education: [x] Review HEP    [] Progressed/Changed HEP based on:   [] positioning   [] body mechanics   [] transfers   [] heat/ice application    [] other:      Other Objective/Functional Measures:   Decreased WB on right LE with standing activities in parallel bars  Able to ambulate from parallel bars to front door of clinic today      Pain Level (0-10 scale) post treatment: 7    ASSESSMENT/Changes in Function: Pt able to complete alternating step taps to cones today in parallel bars however apprehension on increasing WB on right LE with standing TB rows and PNF today needing min A to improve weight shift.  Mod A for RW advancement for walking from parallel bars to front door of clinic with pt unable to complete recipricol steps > 3 today. Able to clear small green ivy today with RW however increased time needed. Patient will continue to benefit from skilled PT services to modify and progress therapeutic interventions, address functional mobility deficits, address strength deficits, analyze and cue movement patterns, analyze and modify body mechanics/ergonomics, assess and modify postural abnormalities, address imbalance/dizziness and instruct in home and community integration to attain remaining goals. []  See Plan of Care  []  See progress note/recertification  []  See Discharge Summary         Progress towards goals / Updated goals:  Short Term Goals: STG- To be accomplished in 6 treatment(s):  1.  Pt will be independent with HEP to encourage prophylaxis. Eval:held due to time  Last PN: pt son notes she walks at home approx 76' loop and sometimes can do this 2x  Current: compliance per son report, walking everyday goal MET     2. Pt will be able to perform 5 sit to stands without rest to indicate increased mobility. Eval: able to complete 3  Last PN: MET  performed 5 without rest break. Takes approx 20 sec sit to stand and back to sit consitantly today 5 in row without break 2/6 /20 .     Long Term Goals: LTG- To be accomplished in 16 treatment(s):  1.  Pt will be able to enter/exit clinic with FW RW and walk with step through gait pattern to indicate improved community intergration. Eval:entered with WC, step to gait pattern   Last PN improved dania with RW today wth walking from stepper to chair outside of parallel bars  Current:walked from gym to front door of clinic with mod A for RW advancement 3/11/20       2.  Pt will improve 5 time sit to  less than 30 sec Mod I to indicate improved mobility and functional strength.   Eval:5 time sit to stand: able to complete 3 times at 30.3 sec from 20 in with bilateral UE  Last PN:  Able to perform 5 sit to stands without rest from [facility] HEART  Current: Progressing 1/30/20 unsure as to if 30 sec is realistic goal, increased tolerance to sit to stands can complete 5 without stopping from WC in 90.4 sec   2. Updated to 5 sit to stands from [facility] in less than 1 min Mod I to indicate improved mobility and functional strength  Last PN:  able to complete in 1 min 24 seconds today   Current: 41 secs with improved ability to transition between reps goal MET     3.  Pt will improve TUG to 30 sec with FW RW and mod I to indicate improved mobility. Eval:TU.56 sec with FW RW and CGA with step to gait pattern   Last PN: TUG: trial 1: 104 sec with RW, SBA and step through gait, took 20 sec to stand  Trial 2: 96 sec with RW and SBA and step through, took 19 sec to stand  Current: 4 min 3 seconds first  Trial, 2 min 25 sec second trials, 15 seconds sit to stand with increased time with turns   3. Updated: TUG in less than 94 sec with RW W RW and mod I to indicate improved mobility.   Current: 20 not officially addressed but noted improved gait speed - challenged to have step through gait with walker vs step tap       PLAN  [x]  Upgrade activities as tolerated     [x]  Continue plan of care  []  Update interventions per flow sheet       []  Discharge due to:_  []  Other:_      Carry Cornel 3/11/2020  2:23 PM    Future Appointments   Date Time Provider [place]   3/13/2020  1:45 PM Tracy Overton [facility]   3/16/2020 11:00 AM Lorne Saldana, PT MIHPANN [facility]   3/18/2020  1:00 PM Danny Brock, PHD [address]   3/19/2020  9:00 AM Tracy Overton [facility]   3/20/2020 11:00 AM Tracy Overton [facility]   3/23/2020 11:00 AM Raymond Nelson [facility]PT[facility]   3/25/2020 10:45 AM Tracy Overton [facility]   3/27/2020 11:00 AM [facility]

## 2020-03-13 ENCOUNTER — HOSPITAL ENCOUNTER (OUTPATIENT)
Dept: PHYSICAL THERAPY | Age: 85
Discharge: HOME | End: 2020-03-13
Payer: MEDICARE

## 2020-03-13 PROCEDURE — 97110 THERAPEUTIC EXERCISES: CPT

## 2020-03-13 PROCEDURE — 97530 THERAPEUTIC ACTIVITIES: CPT

## 2020-03-13 NOTE — PROGRESS NOTES
[facility] at the [address], [place], [address] [facility]  Phone: [phone]      Fax:  [phone]    Continued Plan of Care/ Re-certification for Physical Therapy Services    Patient name: Alina Hughes Start of Care: 12/17/2019   Referral source: Kelin Donato MD XHD: 62/[id number]               Medical Diagnosis: Other abnormalities of gait and mobility [R26.89]    Onset Date:DOI August 2019               Treatment Diagnosis: Gait Dysfunction    Prior Hospitalization: see medical history Provider#: [id number]   Medications: Verified on Patient summary List    Comorbidities: HTN, Allergies, Arthritis    Prior Level of Function: Prior to fall \"I just started using a cane\", Mod I with all ADLs       Visits from Start of Care: 34    Missed Visits: 0    The Plan of Care and following information is based on the patient's current status:    PROGRESS TOWARDS GOALS:   Short Term Goals: STG- To be accomplished in 6 treatment(s):  1.  Pt will be independent with HEP to encourage prophylaxis. Eval:held due to time  Last PN: pt son notes she walks at home approx 76' loop and sometimes can do this 2x  Current: compliance per son report, walking everyday goal MET     2. Pt will be able to perform 5 sit to stands without rest to indicate increased mobility. Eval: able to complete 3  Last PN: MET  performed 5 without rest break. Takes approx 20 sec sit to stand and back to sit consitantly today 5 in row without break 2/6 /20 .     Long Term Goals: LTG- To be accomplished in 16 treatment(s):  1.  Pt will be able to enter/exit clinic with FW RW and walk with step through gait pattern to indicate improved community intergration.   Eval:entered with WC, step to gait pattern   Last PN improved dania with RW today wth walking from stepper to chair outside of parallel bars  Current:walked from gym to outside of clinic with mod A for RW advancement however still step to pattern progressing       2.  Pt will improve 5 time sit to  less than 30 sec Mod I to indicate improved mobility and functional strength. Eval:5 time sit to stand: able to complete 3 times at 30.3 sec from 20 in with bilateral UE  Last PN:  [facility] perform [facility]  Current: Progressing 20 unsure as to if 30 sec is realistic goal, increased tolerance to sit to stands can complete 5 without stopping from WC in 90.4 sec   2. Updated to 5 sit to stands from [facility] in less than 1 min Mod I to indicate improved mobility and functional strength  Last PN:  able to complete in 1 min 24 seconds today   Current: 41 secs with improved ability to transition between reps goal MET     3.  Pt will improve TUG to 30 sec with FW RW and mod I to indicate improved mobility. Eval:TU.56 sec with FW RW and CGA with step to gait pattern   Last PN: TUG: trial 1: 104 sec with RW, SBA and step through gait, took 20 sec to stand  Trial 2: 96 sec with RW and SBA and step through, took 19 sec to stand  Current: 4 min 3 seconds first  Trial, 2 min 25 sec second trials, 15 seconds sit to stand with increased time with turns   3. Updated: TUG in less than 94 sec with RW W RW and mod I to indicate improved mobility. Current: 53.75 seconds goal MET     Updated goals 2020  1. Pt will be able to stand > 10 minutes before fatigue to demonstrate improved standing endurance for ADLs.    Last PN: fatigue after 2 minutes of standing exercises in clinic  Current: able to stand for > 5 minutes before rest today progressing      2. Pt will be able to complete 8 alternating taps on step with UE assist consecutively to demonstrate improved weight shifting onto right LE. Last PN:  5 steps on same side with breaks in between, increased time lifting left LE  Current: able to complete 8 alternating to cones goal MET      Updated goals 3/13/2020:  1.  Pt will be able to complete 3 steps ascending forwards and backwards with right HR to be able to enter through front of house  Current: challenged with step up onto 2in step in parallel bars    2. Pt will be able to complete TUG in < 45 seconds with RW to demonstrate improved functional gait speed  Current: 53.75 seconds with RW      Key functional changes: improved TUG, 5x sit to stand,  Standing tolerance, able to alternating cone taps and step over hurdles with UE assist    Problems/ barriers to goal attainment: pain, right hip audible and palpable adjustment with walking and hesitancy with placing weight on right LE with exercises, decreased endurance     Problem List: pain affecting function, decrease strength, impaired gait/ balance, decrease ADL/ functional abilitiies, decrease activity tolerance, decrease flexibility/ joint mobility and decrease transfer abilities    Treatment Plan: Therapeutic exercise, Therapeutic activities, Neuromuscular re-education, Physical agent/modality, Gait/balance training, Patient education, Self Care training, Functional mobility training, Home safety training and Stair training     Patient Goal (s) has been updated and includes: \"walk better\"     Goals for this certification period to be accomplished in 8 weeks:  See goals above    Frequency / Duration: Patient to be seen 2 times per week for 8 weeks:    Assessment / Recommendations: Pt is continuing to make progress towards goals with TUG score improving to 54 seconds and 5x sit to  41 seconds demonstrating improving functional mobility. Continues to rely heavily on RW with UE to advance right LE with gait with audible and palpable adjustment still at right hip however improved gait speed with min to mod A for RW advancement with ability to complete reciprocal pattern up to 3 steps. Pt's son reporting maxA needed for managing blankets for pt to get off couch in morning to go to bathroom and inability to manage stairs to get into home.  Pt would benefit from continued skilled PT services to address gait, balance, stairs, and overall mobility to decrease burden of care for son and increase independence for pt.         Certification Period: 3/13/20 - 5/13/2020    Filiberto Atkinson CAS [place] 3/13/2020 4:25 PM    ________________________________________________________________________  I certify that the above Therapy Services are being furnished while the patient is under my care. I agree with the treatment plan and certify that this therapy is necessary. [] I have read the above and request that my patient continue as recommended.   [] I have read the above report and request that my patient continue therapy with the following changes/special instructions: ______________________________________  [] I have read the above report and request that my patient be discharged from therapy    Physician's Signature:____________Date:_________TIME:________    ** Signature, Date and Time must be completed for valid certification **    Please sign and return to [facility] at the [address], [address] [facility]  Phone: [phone]      Fax:  [phone]

## 2020-03-13 NOTE — PROGRESS NOTES
PT DAILY TREATMENT NOTE    Patient Name: Angelito Castano  Date:3/13/2020  : 1930  [x]  Patient  Verified  Payor: Abhinav Signs / Plan: [place] MEDICARE PART A & B / Product Type: Medicare /    In time:1:40  Out time:2:50  Total Treatment Time (min): 70  Total Timed Codes (min): 50  1:1 Treatment Time ([facility] only): 50 (20 min total rest and discussion with son)   Visit #: 34 of 40    Treatment Area: Other abnormalities of gait and mobility [R26.89]    SUBJECTIVE  Pain Level (0-10 scale): 7  Any medication changes, allergies to medications, adverse drug reactions, diagnosis change, or new procedure performed?: [x] No    [] Yes (see summary sheet for update)  Subjective functional status/changes:   [] No changes reported  \"My back and hip are sore. \"Son reporting going to chiropractor prior to therapy today. OBJECTIVE      20 min Therapeutic Exercise:  [x] See flow sheet :   Rationale: increase ROM and increase strength to improve the patients ability to perform daily activities with decreased pain and symptom levels    30 min Therapeutic Activity:  [x]  See flow sheet :   Rationale: improve coordination, improve balance and increase proprioception  to improve the patients ability to perform daily activities with decreased pain and symptom levels       With   [] TE   [] TA   [] neuro   [] other: Patient Education: [x] Review HEP    [] Progressed/Changed HEP based on:   [] positioning   [] body mechanics   [] transfers   [] heat/ice application    [] other:      Other Objective/Functional Measures:   See updated goals below  Challenged with step ups on level 1 step     Pain Level (0-10 scale) post treatment: 6    ASSESSMENT/Changes in Function: Pt is continuing to make progress towards goals with TUG score improving to 54 seconds and 5x sit to  41 seconds demonstrating improving functional mobility.  Continues to rely heavily on RW with UE to advance right LE with gait with audible and palpable adjustment still at right hip however improved gait speed with min to mod A for RW advancement with ability to complete reciprocal pattern up to 3 steps. Pt's son reporting maxA needed for managing blankets for pt to get off couch in morning to go to bathroom and inability to manage stairs to get into home. Pt would benefit from continued skilled PT services to address gait, balance, stairs, and overall mobility to decrease burden of care for son and increase independence for pt. Patient will continue to benefit from skilled PT services to modify and progress therapeutic interventions, address functional mobility deficits, address strength deficits, analyze and cue movement patterns, analyze and modify body mechanics/ergonomics, assess and modify postural abnormalities, address imbalance/dizziness and instruct in home and community integration to attain remaining goals. []  See Plan of Care  []  See progress note/recertification  []  See Discharge Summary         Progress towards goals / Updated goals:  Short Term Goals: STG- To be accomplished in 6 treatment(s):  1.  Pt will be independent with HEP to encourage prophylaxis. Eval:held due to time  Last PN: pt son notes she walks at home approx 76' loop and sometimes can do this 2x  Current: compliance per son report, walking everyday goal MET     2. Pt will be able to perform 5 sit to stands without rest to indicate increased mobility. Eval: able to complete 3  Last PN: MET  performed 5 without rest break. Takes approx 20 sec sit to stand and back to sit consitantly today 5 in row without break 2/6 /20 .     Long Term Goals: LTG- To be accomplished in 16 treatment(s):  1.  Pt will be able to enter/exit clinic with FW RW and walk with step through gait pattern to indicate improved community intergration.   Eval:entered with WC, step to gait pattern   Last PN improved dania with RW today wth walking from stepper to chair outside of parallel bars  Current:walked from gym to outside of clinic with mod A for RW advancement however still step to pattern progressing       2.  Pt will improve 5 time sit to  less than 30 sec Mod I to indicate improved mobility and functional strength. Eval:5 time sit to stand: able to complete 3 times at 30.3 sec from 20 in with bilateral UE  Last PN:  A[facility] to perform 5 sit to stands [facility]  Current: Progressing 20 unsure as to if 30 sec is realistic goal, increased tolerance to sit to stands can complete 5 without stopping from WC in 90.4 sec   2. Updated to 5 sit to stands from [facility] in less than 1 min Mod I to indicate improved mobility and functional strength  Last PN:  able to complete in 1 min 24 seconds today   Current: 41 secs with improved ability to transition between reps goal MET     3.  Pt will improve TUG to 30 sec with FW RW and mod I to indicate improved mobility. Eval:TU.56 sec with FW RW and CGA with step to gait pattern   Last PN: TUG: trial 1: 104 sec with RW, SBA and step through gait, took 20 sec to stand  Trial 2: 96 sec with RW and SBA and step through, took 19 sec to stand  Current: 4 min 3 seconds first  Trial, 2 min 25 sec second trials, 15 seconds sit to stand with increased time with turns   3. Updated: TUG in less than 94 sec with RW W RW and mod I to indicate improved mobility. Current: 53.75 seconds goal MET    Updated goals 2020  1. Pt will be able to stand > 10 minutes before fatigue to demonstrate improved standing endurance for ADLs. Last PN: fatigue after 2 minutes of standing exercises in clinic  Current: able to stand for > 5 minutes before rest today progressing      2. Pt will be able to complete 8 alternating taps on step with UE assist consecutively to demonstrate improved weight shifting onto right LE.   Last PN:  5 steps on same side with breaks in between, increased time lifting left LE  Current: able to complete 8 alternating to cones goal MET    PLAN  [x]  Upgrade activities as tolerated     [x]  Continue plan of care  []  Update interventions per flow sheet       []  Discharge due to:_  []  Other:_      Aashish Josr 3/13/2020  1:27 PM    Future Appointments   Date Time Provider [place]le Valencia   3/13/2020  1:45 PM Elsy Overton [facility]   3/16/2020 11:00 AM Magno Green, PT L[facility]   3/18/2020  1:00 PM Jermain Blood, PHD [address]   3/19/2020  9:00 AM Elsy Overton [facility]   3/20/2020 11:00 AM Elsy Overton [facility]   3/23/2020 11:00 AM Peter STRONG [facility]   3/25/2020 10:45 AM Elsy Overton [facility]   3/27/2020 11:00 AM Elsy Overton [facility]

## 2020-03-16 ENCOUNTER — HOSPITAL ENCOUNTER (OUTPATIENT)
Dept: PHYSICAL THERAPY | Age: 85
Discharge: HOME | End: 2020-03-16
Payer: MEDICARE

## 2020-03-16 PROCEDURE — 97110 THERAPEUTIC EXERCISES: CPT

## 2020-03-16 PROCEDURE — 97116 GAIT TRAINING THERAPY: CPT

## 2020-03-16 NOTE — PROGRESS NOTES
PT DAILY TREATMENT NOTE    Patient Name: Gian Palafox  Date:3/16/2020  : 1930  [x]  Patient  Verified  Payor: Joann Henry / Plan: [place] / Product Type: Medicare /    In time:1100  Out time:1220  Total Treatment Time (min): 80  Total Timed Codes (min): 80  1:1 Treatment Time ([facility] only): 80    Visit #: 35 of 34+16    Treatment Area:  Other abnormalities of gait and mobility [R26.89]    SUBJECTIVE  Pain Level (0-10 scale): 5  Any medication changes, allergies to medications, adverse drug reactions, diagnosis change, or new procedure performed?: [x] No    [] Yes (see summary sheet for update)  Subjective functional status/changes:   [x] No changes reported      OBJECTIVE      50 min Therapeutic Exercise:  [x] See flow sheet :   Rationale: increase ROM and increase strength to improve the patients ability to maintain functional mobility /strength with ADL      30 min Gait Trainin feet with RW device on level surfaces with min level of assist to advanced RW and constant VC for improved step length and foot placement, patient requires multiple rest periods   Rationale:improved gait pattern and endurance with ambulation to return to max function/household ambulation          With   [x] TE   [] TA   [] neuro   [] other: Patient Education: [x] Review HEP    [] Progressed/Changed HEP based on:   [] positioning   [] body mechanics   [] transfers   [] heat/ice application    [] other:      Other Objective/Functional Measures: improved step length right   Continues to need almost constant VC and encouragement     Pain Level (0-10 scale) post treatment: 5    ASSESSMENT/Changes in Function: reports right hip pain during ambulation, crepitation and right hip instability persist    Patient will continue to benefit from skilled PT services to address ROM deficits, address strength deficits, analyze and address soft tissue restrictions, analyze and cue movement patterns and assess and modify postural abnormalities to attain remaining goals. []  See Plan of Care  [x]  See progress note/recertification  []  See Discharge Summary         Progress towards goals / Updated goals:  Short Term Goals: STG- To be accomplished in 6 treatment(s):  1.  Pt will be independent with HEP to encourage prophylaxis. Eval:held due to time  Last PN: pt son notes she walks at home approx 76' loop and sometimes can do this 2x  Current: compliance per son report, walking everyday goal MET     2. Pt will be able to perform 5 sit to stands without rest to indicate increased mobility. Eval: able to complete 3  Last PN: MET  performed 5 without rest break. Takes approx 20 sec sit to stand and back to sit consitantly today 5 in row without break 2/6 /20 .     Long Term Goals: LTG- To be accomplished in 16 treatment(s):  1.  Pt will be able to enter/exit clinic with FW RW and walk with step through gait pattern to indicate improved community intergration. Eval:entered with WC, step to gait pattern   Last PN improved dania with RW today wth walking from stepper to chair outside of parallel bars  Current:walked from gym to outside of clinic with mod A for RW advancement however still step to pattern progressing       2.  Pt will improve 5 time sit to  less than 30 sec Mod I to indicate improved mobility and functional strength.   Eval:5 time sit to stand: able to complete 3 times at 30.3 sec from 20 in with bilateral UE  Last PN:  Able to perform 5 sit to stands without rest from [facility]  Current: Progressing 1/30/20 unsure as to if 30 sec is realistic goal, increased tolerance to sit to stands can complete 5 without stopping from WC in 90.4 sec   2. Updated to 5 sit to stands from [facility] in less than 1 min Mod I to indicate improved mobility and functional strength  Last PN:  able to complete in 1 min 24 seconds today   Current: 41 secs with improved ability to transition between reps goal MET     3.  Pt will improve TUG to 30 sec with FW RW and mod I to indicate improved mobility. Eval:TU.56 sec with FW RW and CGA with step to gait pattern   Last PN: TUG: trial 1: 104 sec with RW, SBA and step through gait, took 20 sec to stand  Trial 2: 96 sec with RW and SBA and step through, took 19 sec to stand  Current: 4 min 3 seconds first  Trial, 2 min 25 sec second trials, 15 seconds sit to stand with increased time with turns   3. Updated: TUG in less than 94 sec with RW W RW and mod I to indicate improved mobility. Current: 53.75 seconds goal MET     Updated goals 2020  1. Pt will be able to stand > 10 minutes before fatigue to demonstrate improved standing endurance for ADLs.    Last PN: fatigue after 2 minutes of standing exercises in clinic  Current: able to stand for > 5 minutes before rest today progressing      2. Pt will be able to complete 8 alternating taps on step with UE assist consecutively to demonstrate improved weight shifting onto right LE. Last PN:  5 steps on same side with breaks in between, increased time lifting left LE  Current: able to complete 8 alternating to cones goal MET    Updated goals 3/13/2020:  1.  Pt will be able to complete 3 steps ascending forwards and backwards with right HR to be able to enter through front of house  Current: challenged with step up onto 2in step in parallel bars     2. Pt will be able to complete TUG in < 45 seconds with RW to demonstrate improved functional gait speed  Current: 53.75 seconds with RW     PLAN  []  Upgrade activities as tolerated     [x]  Continue plan of care for 8 weeks  []  Update interventions per flow sheet       []  Discharge due to:_  []  Other:_      Ella Fontenot, PT 3/16/2020  12:35 PM    Future Appointments   Date Time Provider Gayle Valencia   3/18/2020  1:00 PM Holland Garrison, PHD [address]   3/19/2020  9:00 AM Aileen Overton [facility]   3/20/2020 11:00 AM Aileen Overton [facility]   3/23/2020 11:00 AM Lauren Vallejo [facility]   3/25/2020 10:45 AM David Overton [facility]   3/27/2020 11:00 AM David Overton [facility]

## 2020-03-18 ENCOUNTER — OFFICE VISIT (OUTPATIENT)
Dept: NEUROLOGY | Age: 85
End: 2020-03-18

## 2020-03-18 DIAGNOSIS — F01.50 MIXED DEMENTIA (HCC): Primary | ICD-10-CM

## 2020-03-18 DIAGNOSIS — G30.9 MIXED DEMENTIA (HCC): Primary | ICD-10-CM

## 2020-03-18 DIAGNOSIS — F02.80 MIXED DEMENTIA (HCC): Primary | ICD-10-CM

## 2020-03-19 ENCOUNTER — HOSPITAL ENCOUNTER (OUTPATIENT)
Dept: PHYSICAL THERAPY | Age: 85
Discharge: HOME | End: 2020-03-19
Payer: MEDICARE

## 2020-03-19 PROCEDURE — 97116 GAIT TRAINING THERAPY: CPT

## 2020-03-19 PROCEDURE — 97110 THERAPEUTIC EXERCISES: CPT

## 2020-03-19 PROCEDURE — 97530 THERAPEUTIC ACTIVITIES: CPT

## 2020-03-19 NOTE — PROGRESS NOTES
PT DAILY TREATMENT NOTE    Patient Name: Yarely Wells  Date:3/19/2020  : 1930  [x]  Patient  Verified  Payor: Shawnee Barron / Plan: [place] MED[place] PART A & B / Product Type: Medicare /    In time:9:02  Out time:10:15  Total Treatment Time (min): 88  Total Timed Codes (min): 78 (10 min total rest break)  1:1 Treatment Time ( [address] only): 78   Visit #: 36 of 50    Treatment Area: Other abnormalities of gait and mobility [R26.89]    SUBJECTIVE  Pain Level (0-10 scale): 4  Any medication changes, allergies to medications, adverse drug reactions, diagnosis change, or new procedure performed?: [x] No    [] Yes (see summary sheet for update)  Subjective functional status/changes:   [] No changes reported  \"My hip on the right. \"    OBJECTIVE    53 min Therapeutic Exercise:  [x] See flow sheet :   Rationale: increase ROM and increase strength to improve the patients ability to perform daily activities with decreased pain and symptom levels    20 min Therapeutic Activity:  [x]  See flow sheet :   Rationale: increase strength, improve coordination, improve balance and increase proprioception  to improve the patients ability to perform daily activities with decreased pain and symptom levels    15 min Gait Training: parallel bars to car in parking lot with SBA with cues for RW advancement    Rationale: To improve ambulation safety and efficiency in order to improve patient's ability to safely ambulate at home for self care.            With   [] TE   [] TA   [] neuro   [] other: Patient Education: [x] Review HEP    [] Progressed/Changed HEP based on:   [] positioning   [] body mechanics   [] transfers   [] heat/ice application    [] other:      Other Objective/Functional Measures:   14min 30 sec to walk to parking lot into car today, able to lift LE with UE assist  Challenged with standing up straight without UE assist     Pain Level (0-10 scale) post treatment: 7    ASSESSMENT/Changes in Function: Pt able to amb out to parking lot and into car today without rest break however decreased dania and continued step to pattern. Continues to be most challenged with standing activities without UE assist due to fatigue and back pain. Ascending and descending 4in step today forwards with bilateral UE assist in parallel bars. Patient will continue to benefit from skilled PT services to modify and progress therapeutic interventions, address functional mobility deficits, address strength deficits, analyze and cue movement patterns, analyze and modify body mechanics/ergonomics, assess and modify postural abnormalities, address imbalance/dizziness and instruct in home and community integration to attain remaining goals. []  See Plan of Care  []  See progress note/recertification  []  See Discharge Summary         Progress towards goals / Updated goals:   Long Term Goals: LTG- To be accomplished in 16 treatment(s):  1.  Pt will be able to enter/exit clinic with FW RW and walk with step through gait pattern to indicate improved community intergration. Eval:entered with WC, step to gait pattern   Last PN:walked from gym to outside of clinic with mod A for RW advancement however still step to pattern  Current: 14min 30 seconds to walk with RW from parallel bars to car in parking lot, SBA with cues occasionally to advance RW before right LE, still step to pattern progressing 3/19/20       2.  Pt will improve 5 time sit to  less than 30 sec Mod I to indicate improved mobility and functional strength.   Eval:5 time sit to stand: able to complete 3 times at 30.3 sec from 20 in with bilateral UE  Last PN:  Able to perform 5 sit to stands without rest from [facility]  Current: Progressing 1/30/20 unsure as to if 30 sec is realistic goal, increased tolerance to sit to stands can complete 5 without stopping from WC in 90.4 sec   2. Updated to 5 sit to stands from [facility] in less than 1 min Mod I to indicate improved mobility and functional strength  Last PN[de-identified] 41 secs with improved ability to transition between reps goal MET     3.  Pt will improve TUG to 30 sec with FW RW and mod I to indicate improved mobility. Eval:TU.56 sec with FW RW and CGA with step to gait pattern   Last PN: TUG: trial 1: 104 sec with RW, SBA and step through gait, took 20 sec to stand  Trial 2: 96 sec with RW and SBA and step through, took 19 sec to stand  Current: 4 min 3 seconds first  Trial, 2 min 25 sec second trials, 15 seconds sit to stand with increased time with turns   3. Updated: TUG in less than 94 sec with RW W RW and mod I to indicate improved mobility. Last PN: 53.75 seconds goal MET     Updated goals 2020  1. Pt will be able to stand > 10 minutes before fatigue to demonstrate improved standing endurance for ADLs.    Last PN:: able to stand for > 5 minutes before rest today   Current:      2. Pt will be able to complete 8 alternating taps on step with UE assist consecutively to demonstrate improved weight shifting onto right LE. Last PN:  5 steps on same side with breaks in between, increased time lifting left LE  Last PN: able to complete 8 alternating to cones goal MET        Updated goals 3/13/2020:  1. Pt will be able to complete 3 steps ascending forwards and backwards with right HR to be able to enter through front of house  Last PN: challenged with step up onto 2in step in parallel bars  Current: able to complete 4in step today with ascending and descending forwards 3/19/20     2.  Pt will be able to complete TUG in < 45 seconds with RW to demonstrate improved functional gait speed  Last PN: 53.75 seconds with RW  Current:     PLAN  [x]  Upgrade activities as tolerated     [x]  Continue plan of care  []  Update interventions per flow sheet       []  Discharge due to:_  []  Other:_      Shahab Girard 3/19/2020  10:17 AM    Future Appointments   Date Time Provider Gayle Valencia   3/20/2020 11:00 AM Rosendo Overton [facility]   3/23/2020 11:00 AM Ragini Salgado, PT [facility]   3/25/2020 10:45 AM Tasneem Overton Cornea [facility]   3/27/2020 11:00 AM Tasneem Overton Cornea [facility]   4/7/2020  1:00 PM Skye Hammonds, PHD [address] [place] [address]

## 2020-03-20 ENCOUNTER — HOSPITAL ENCOUNTER (OUTPATIENT)
Dept: PHYSICAL THERAPY | Age: 85
Discharge: HOME | End: 2020-03-20
Payer: MEDICARE

## 2020-03-20 PROCEDURE — 97110 THERAPEUTIC EXERCISES: CPT

## 2020-03-20 PROCEDURE — 97530 THERAPEUTIC ACTIVITIES: CPT

## 2020-03-20 PROCEDURE — 97116 GAIT TRAINING THERAPY: CPT

## 2020-03-20 NOTE — PROGRESS NOTES
PT DAILY TREATMENT NOTE    Patient Name: Verner Jewel  Date:3/20/2020  : 1930  [x]  Patient  Verified  Payor: Ronaldo Butler / Plan: [place] MED[place] PART A [place] B / Product Type: Medicare /    In time:11:01  Out time:12:05  Total Treatment Time (min): 64  Total Timed Codes (min): 64  1:1 Treatment Time ( [address] only): 64   Visit #: 37 of 50    Treatment Area: Other abnormalities of gait and mobility [R26.89]    SUBJECTIVE  Pain Level (0-10 scale): 4  Any medication changes, allergies to medications, adverse drug reactions, diagnosis change, or new procedure performed?: [x] No    [] Yes (see summary sheet for update)  Subjective functional status/changes:   [] No changes reported  \"My hip is sore today. \"    OBJECTIVE    31 min Therapeutic Exercise:  [x] See flow sheet :   Rationale: increase ROM and increase strength to improve the patients ability to perform daily activities with decreased pain and symptom levels    20 min Therapeutic Activity:  [x]  See flow sheet :   Rationale: increase strength, improve coordination, improve balance and increase proprioception  to improve the patients ability to perform daily activities with decreased pain and symptom levels       15 min Gait Training:  parallel bars to car in parking lot with Superivsion with cues for RW advancement prior to right LE advancement   Rationale: To improve ambulation safety and efficiency in order to improve patient's ability to safely ambulate at home for self care.            With   [] TE   [] TA   [] neuro   [] other: Patient Education: [x] Review HEP    [] Progressed/Changed HEP based on:   [] positioning   [] body mechanics   [] transfers   [] heat/ice application    [] other:      Other Objective/Functional Measures:   Improved sit to stands with 1 UE today  14 min 20 seconds amb to car today with supervision      Pain Level (0-10 scale) post treatment: 7    ASSESSMENT/Changes in Function: Consistent cueing still to place weight through right LE with standing activities. Step to pattern with walking with RW still with cues to increase left step length today. Patient will continue to benefit from skilled PT services to modify and progress therapeutic interventions, address functional mobility deficits, address strength deficits, analyze and cue movement patterns, analyze and modify body mechanics/ergonomics, assess and modify postural abnormalities, address imbalance/dizziness and instruct in home and community integration to attain remaining goals. []  See Plan of Care  []  See progress note/recertification  []  See Discharge Summary         Progress towards goals / Updated goals:  Long Term Goals: LTG- To be accomplished in 16 treatment(s):  1.  Pt will be able to enter/exit clinic with FW RW and walk with step through gait pattern to indicate improved community intergration. Eval:entered with WC, step to gait pattern   Last PN:walked from gym to outside of clinic with mod A for RW advancement however still step to pattern  Current: 14min 30 seconds to walk with RW from parallel bars to car in parking lot, SBA with cues occasionally to advance RW before right LE, still step to pattern progressing 3/19/20       2.  Pt will improve 5 time sit to  less than 30 sec Mod I to indicate improved mobility and functional strength. Eval:5 time sit to stand: able to complete 3 times at 30.3 sec from 20 in with bilateral UE  Last PN:  Able to perform 5 sit to stands without rest from [facility]  Current: Progressing 20 unsure as to if 30 sec is realistic goal, increased tolerance to sit to stands can complete 5 without stopping from WC in 90.4 sec   2. Updated to 5 sit to stands from [facility] in less than 1 min Mod I to indicate improved mobility and functional strength  Last PN[de-identified] 41 secs with improved ability to transition between reps goal MET     3.  Pt will improve TUG to 30 sec with FW RW and mod I to indicate improved mobility.   Eval:TU.56 sec with FW RW and CGA with step to gait pattern   Last PN: TUG: trial 1: 104 sec with RW, SBA and step through gait, took 20 sec to stand  Trial 2: 96 sec with RW and SBA and step through, took 19 sec to stand  Current: 4 min 3 seconds first  Trial, 2 min 25 sec second trials, 15 seconds sit to stand with increased time with turns   3. Updated: TUG in less than 94 sec with RW W RW and mod I to indicate improved mobility. Last PN: 53.75 seconds goal MET     Updated goals 2/19/2020  1. Pt will be able to stand > 10 minutes before fatigue to demonstrate improved standing endurance for ADLs.    Last PN:: able to stand for > 5 minutes before rest today   Current:      2. Pt will be able to complete 8 alternating taps on step with UE assist consecutively to demonstrate improved weight shifting onto right LE. Last PN:  5 steps on same side with breaks in between, increased time lifting left LE  Last PN: able to complete 8 alternating to cones goal MET        Updated goals 3/13/2020:  1. Pt will be able to complete 3 steps ascending forwards and backwards with right HR to be able to enter through front of house  Last PN: challenged with step up onto 2in step in parallel bars  Current: able to complete 4in step today with ascending and descending forwards 3/19/20     2.  Pt will be able to complete TUG in < 45 seconds with RW to demonstrate improved functional gait speed  Last PN: 53.75 seconds with RW  Current:     PLAN  []  Upgrade activities as tolerated     [x]  Continue plan of care  []  Update interventions per flow sheet       []  Discharge due to:_  []  Other:_      Yosvany Barron 3/20/2020  12:25 PM    Future Appointments   Date Time Provider Gayle Valencia   3/23/2020 11:00 AM Donavon Renteria, PT [place] [facility]   3/25/2020 10:45 AM Rosa Maria Overton [facility]   3/27/2020 11:00 AM Rosa Maria Overton [facility]   4/7/2020  1:00 PM Luciano Caro, PHD [address]

## 2020-03-23 ENCOUNTER — HOSPITAL ENCOUNTER (OUTPATIENT)
Dept: PHYSICAL THERAPY | Age: 85
Discharge: HOME | End: 2020-03-23
Payer: MEDICARE

## 2020-03-23 PROCEDURE — 97110 THERAPEUTIC EXERCISES: CPT

## 2020-03-23 PROCEDURE — 97116 GAIT TRAINING THERAPY: CPT

## 2020-03-23 PROCEDURE — 97530 THERAPEUTIC ACTIVITIES: CPT

## 2020-03-23 NOTE — PROGRESS NOTES
PT DAILY TREATMENT NOTE    Patient Name: Ana Lilia Skinner  Date:3/23/2020  : 1930  [x]  Patient  Verified  Payor: Luis Navarro / Plan: [place] / Product Type: Medicare /    In time:1055  Out time:1205  Total Treatment Time (min): 70  Total Timed Codes (min): 70  1:1 Treatment Time (MC/BCBS only): 70    Visit #: 38 of 50    Treatment Area:  Other abnormalities of gait and mobility [R26.89]    SUBJECTIVE  Pain Level (0-10 scale): 4  Any medication changes, allergies to medications, adverse drug reactions, diagnosis change, or new procedure performed?: [x] No    [] Yes (see summary sheet for update)  Subjective functional status/changes:   [] No changes reported  Pain right hip    OBJECTIVE      25 min Therapeutic Exercise:  [x] See flow sheet :   Rationale: increase ROM, increase strength, improve balance and increase proprioception to improve the patients ability to return to PLOF    20 min Therapeutic Activity:  [x]  See flow sheet :balance and stepping activities   Rationale: improve balance and increase proprioception  to improve the patients ability to improve overall ambulatory and standing function       25 min Gait Training:  >300 feet with RW device on level surfaces with min level of assist to advance walker and assure proper gait pattern   Rationale:improve ambulatory function and overall endurance          With   [x] TE   [x] TA   [] neuro   [] other: Patient Education: [x] Review HEP    [] Progressed/Changed HEP based on:   [] positioning   [] body mechanics   [] transfers   [] heat/ice application    [] other:      Other Objective/Functional Measures: right hip pain with progressive ambulation  Patient able to walk almost to car   Improved ability to perform step up/down fwd / Level 2 step     Pain Level (0-10 scale) post treatment: 6    ASSESSMENT/Changes in Function: patient continues to be limited by right hip pain and endurance deficit, improved stepping/dania    Patient will continue to benefit from skilled PT services to address ROM deficits, address strength deficits, analyze and address soft tissue restrictions, analyze and cue movement patterns and assess and modify postural abnormalities to attain remaining goals. [x]  See Plan of Care  []  See progress note/recertification  []  See Discharge Summary         Progress towards goals / Updated goals:  Long Term Goals: LTG- To be accomplished in 16 treatment(s):  1.  Pt will be able to enter/exit clinic with FW RW and walk with step through gait pattern to indicate improved community intergration. Eval:entered with WC, step to gait pattern   Last PN:walked from gym to outside of clinic with mod A for RW advancement however still step to pattern  Current: 14min 30 seconds to walk with RW from parallel bars to car in parking lot, SBA with cues occasionally to advance RW before right LE, still step to pattern progressing 3/19/20       2.  Pt will improve 5 time sit to  less than 30 sec Mod I to indicate improved mobility and functional strength. Eval:5 time sit to stand: able to complete 3 times at 30.3 sec from 20 in with bilateral UE  Last PN:  Able to perform 5 sit to stands without rest from [facility]  Current: Progressing 20 unsure as to if 30 sec is realistic goal, increased tolerance to sit to stands can complete 5 without stopping from WC in 90.4 sec   2. Updated to 5 sit to stands from [facility] in less than 1 min Mod I to indicate improved mobility and functional strength  Last PN[de-identified] 41 secs with improved ability to transition between reps goal MET     3.  Pt will improve TUG to 30 sec with FW RW and mod I to indicate improved mobility.   Eval:TU.56 sec with FW RW and CGA with step to gait pattern   Last PN: TUG: trial 1: 104 sec with RW, SBA and step through gait, took 20 sec to stand  Trial 2: 96 sec with RW and SBA and step through, took 19 sec to stand  Current: 4 min 3 seconds first  Trial, 2 min 25 sec second trials, 15 seconds sit to stand with increased time with turns   3. Updated: TUG in less than 94 sec with RW W RW and mod I to indicate improved mobility. Last PN: 53.75 seconds goal MET     Updated goals 2/19/2020  1. Pt will be able to stand > 10 minutes before fatigue to demonstrate improved standing endurance for ADLs.    Last PN:: able to stand for > 5 minutes before rest today   Current: patient demonstrating ability to perform various standing activities for 5 min 35 sec with reports of right hip pain, progressing     2. Pt will be able to complete 8 alternating taps on step with UE assist consecutively to demonstrate improved weight shifting onto right LE. Last PN:  5 steps on same side with breaks in between, increased time lifting left LE  Last PN: able to complete 8 alternating to cones goal MET        Updated goals 3/13/2020:  1. Pt will be able to complete 3 steps ascending forwards and backwards with right HR to be able to enter through front of house  Last PN: challenged with step up onto 2in step in parallel bars  Current: able to complete 4 in step today with ascending and descending forwards 3/19/20     2.  Pt will be able to complete TUG in < 45 seconds with RW to demonstrate improved functional gait speed  Last PN: 53.75 seconds with RW  Current: nt    PLAN  []  Upgrade activities as tolerated     [x]  Continue plan of care  []  Update interventions per flow sheet       []  Discharge due to:_  []  Other:_      Cynthia Valdivia, PT 3/23/2020  11:20 AM    Future Appointments   Date Time Provider Gayle Valencia   3/25/2020 11:30 AM Meghann Martínez PT [facility]   3/27/2020 10:15 AM Kade Villanueva, PT, DPT [facility]   4/7/2020  1:00 PM Jeri Campos, PHD [address]

## 2020-03-24 NOTE — PROGRESS NOTES
[facility]  Neuroscience   [address] [place] [address]. [facility], [address]  Office:  [phone]  Fax: [phone]                  Neuropsychological Evaluation Report  Patient Name: Lucas Mares  Age: 80 y.o. Gender: female   Handedness: right handed   Presenting Concern: memory loss  Referring Provider: Dayana La    PATIENT HISTORY (OBTAINED DURING INITIAL CLINICAL EVALUATION):    REASON FOR REFERRAL:  This comprehensive and medically necessary neuropsychological assessment was requested to assist a differential diagnosis of cognitive complaints as well as to comment on capacity for medical decision making. The use and purpose of this examination, as well as the extent and limitations of confidentiality, were explained prior to obtaining permission to participate. Instructions were provided regarding the necessity to put forth optimal effort and answer questions truthfully in order to obtain reliable and accurate test results. REVIEW OF RECORDS:  Ms. Edy Rosario was referred by neurology where she is followed for memory loss which first emerged three years ago. Concerns for decision making have also been raised. Ms. Edy Rosario is  and resides with her son who manages her finances. She is independent for ADLs but no longer drives. Records indicate that there is a pending lawsuit being brought by Ms. Hughes's other children stating that her son Elizabeth Raya has a \"undue influence\" on her. Medications include aspirin, hydrochlorothiazide, losartan, and metoprolol XL 70. An MRI has been ordered. CLINICAL INTERVIEW:  Ms. Edy Rosario arrived for her appointment accompanied by her adult son who participated in the clinical interview. Consistent with records, they reported memory loss which first emerged 2-3 years ago. According to her son, memory has recently shown some improvement. History is negative for syncope, seizure, stroke, and head trauma.  Sleep and appetite disturbance was denied. Pain complaints include arthritis. Family history of neurological illness was denied. With regard to emotional functioning, Ms. Hughes denied a significant psychiatric history. Socially, Ms. Hughes has been  since 2004; she resides with her adult son. An additional biological child and one adopted child live outside of the home. Academically, Ms. Hughes completed her bachelor's degree in mathematics. She denied a history of LD and ADHD. Hobbies include watching television, reading the newspaper, weekly hair appointments, and weekly luncheons. Functionally, Ms. Taina Gray no longer drives. Although her son maintains medical and financial POA, she manages her medications and bill payment independently. Since breaking her leg in August 2019, Ms. Hughes receives assistance for shopping, meal preparation, housekeeping, and laundry. In home health care provides services three days a week for ADL care. In home skilled care has also been received for this injury. CAPACITY:  Ms. Gerry French was engaged in a semi-structured interview regarding medical decision making. She was able to demonstrate familiarity with her active medical problems and treatment planning including prescribed medical regimens.      MENTAL STATUS:    Sensorium  Awake, Aware, Alert   Orientation person, place and situation   Relations cooperative   Eye Contact appropriate   Appearance:  age appropriate   Motor Behavior:  within normal limits   Speech:  normal pitch and normal volume   Vocabulary average   Thought Process: within normal limits   Thought Content free of delusions and free of hallucinations   Suicidal ideations none   Homicidal ideations none   Mood:  euthymic   Affect:  mood-congruent   Memory recent  impaired   Memory remote:  adequate   Concentration:  adequate   Abstraction:  abstract   Insight:  fair   Reliability fair   Judgment:  fair       METHODS OF ASSESSMENT (Current Evaluation):  Clinician Administered: Adaptive Behavioral Assessment System (ABAS)  Clinical Assessment of Geriatric Emotional Status (CASE)  Clock Drawing Task  Geriatric Depression Scale: Short Form (GDS)  Modified Mini-Mental Status Exam (3MS)  Test of Premorbid Functioning (TOPF)    Technician Administered:  Dementia Rating Scales  Neuropsychological Assessment Battery-Select Subtests  Trailmaking Test    TEST OBSERVATIONS:  Ms. Tino Malone arrived promptly for the testing session. Dress and grooming were appropriate; physical presentation was unchanged from that observed during the clinical interview. Speech was fluent and coherent with normal rate, rhythm, tone, and volume. No expressive or receptive language deficits were noted. No unusual behaviors were observed though motor function was slow. Thought process was logical, relevant, and focused. Thought content showed no apparent delusional ideation. Auditory and visual hallucinations were denied and there was no obvious response to internal stimuli. Affect was congruent with the euthymic mood conveyed. Ms. Tino Malone was adequately cooperative and appeared to put forth good effort throughout this examination. Rapport with the examiner was adequately established and maintained. Minimal prompting was required. Comprehension of test instructions was not problematic. Given the above observations, plus comments contained in the Mental Status section, the results of this examination are regarded as reasonably reliable and valid. TEST RESULTS:  Quantitative test results are derived from comparisons to age and education corrected cohort normative data, where applicable. Percentiles are included in these instances. Qualitative test results are determined using clinical observations.              General Orientation and Awareness:       Orientation to person yes   Time no  Place yes  Circumstance yes                     Sensory-Perceptual and Motor Functioning:    Visual and auditory acuity:  WNL       Gait and balance:  Ambulated by wheelchair                 Cognitive Screening: On the Modified Mini-Mental Status Exam, Ms. Tino Malone obtained a severely impaired score. Difficulties were noted in the following areas: mental reversal, immediate and delayed spontaneous and cued memory, orientation (unable to correctly identify the year, month, date, and day of the week), verbal fluency, and figure copy. Clock drawing was significant for missing numbers. Attention/Concentration:       Simple visuomotor tracking (<1 percentile)                    Severely Impaired     Language: Auditory comprehension (1 percentile)                             Severely Impaired   Naming (8 percentile)            Mildly Impaired    Cognitive Tests of Executive Functioning:     Ability to think flexibly, Trailmaking B (<1 percentile)                  Severely Impaired            Dementia Rating Scale -2   Scale:     Age-Corrected MOANS Scaled Score  Percentile   Attention    12       72-81   Initiation and Preservation  4       2    Construction    10       41-59   Conceptualization   10       41-59   Memory    2       <1   Total Score     4       <1    Intellectual and Basic Cognitive Functioning:  ACS Test of pre-morbid functioning reading recognition lower limit estimated WAIS-IV FSIQ score:       Estimated full scale IQ: 115   Percentile: 84     Rating: High Average          Emotional Functioning:        Screening of Emotional/Psychiatric Status:  Level of self-reported depression   (0/15)  Normal    On a measure of emotional functioning completed by Mr. Garcia Sample son, he denied observing clinical mood and anxiety symptoms in his mother. IMPRESSIONS/RECOMMENDATIONS:  Overall impressions are consistent with an evolving dementia of moderate severity.  The pattern of deficits noted on testing was primarily amnestic in nature, suggestive of Alzheimer's pathology for which pharmacotherapy may be beneficial. Other impaired scores (e.g. cognitive flexibility and verbal fluency) could suggest the presence of vascular pathology though a qualitative review of test performance raises the possibility that these scores were the result of motor slowing and impaired processing speed as opposed to executive dysfunction. There was also no evidence of perseveration, another correlate of frontal lobe dysfunction. Pending neuroimaging will hopefully assist with the nuances of this differential diagnosis. Fortunately, in spite of the dementia observed, Ms. Hughes does appear to be able to participate in her treatment, family decisions, and planning for her future. Nevertheless, she will require assistance and supervision for medication management and bill payment. It will also be important to check for comprehension when engaging Ms. Hughes in more complex decision making. Reassuring was the absence of neuropsychiatric features. Her circadian rhythm remains intact, her lifestyle includes adaptive levels of behavior and social activation, and mood and anxiety symptoms were not detected. These are promising aspects that promote a more favorable prognosis and are strongly encouraged, in addition to a physician-approved level of exercise. Serial testing in 12 months is suggested to track progression of symptoms and to inform treatment accordingly. DIAGNOSTIC IMPRESSIONS:  1. Mixed Dementia, moderate    Thank you for allowing me the opportunity to assist you in Ms. Hughes's care. Please do not hesitate to contact me should you have additional questions that I may not have addressed.     96136 x 1  96137 x 1  96138 x 1  96139 x 4  96132 x 1  95278 x 1    Licensed Clinical Psychologist        Time Documentation:     65334 x 1 Neuropsych testing/data gathering by Neuropsychologist: (1 hour) [id number] x1 (first 30 minutes), 96805 x1 (additional 30 minutes)  [id number]     96138 x 1  96139 x 4 Test Administration/Data Gathering By Technician: (3.5 hours). 42329 x 1 (first 30 minutes), 96139 x 4 (each additional 30 minutes)     96132 x 1  96133 x 1 Testing Evaluation Services by Neuropsychologist (1 hour, 50 minutes) 96132 x 1 (first hour), 96133 x 1 (50 minutes)     Definitions:       27533/04441:  Neurobehavioral Status Exam, Clinical interview. Clinical assessment of thinking, reasoning and judgment, by neuropsychologist, both face to face time with patient and time interpreting those test results and reporting, first and subsequent hours)    20012/05118: Neuropsychological Test Administration by Neuropsychologist, first 30 minutes and each additional 30 minutes. 73184/48509: Neuropsychological Test Administration by Technician/Psychometrist, first 30 minutes and each additional 30 minutes. The above includes: Record review. Review of history provided by patient. Review of collaborative information. Testing by Clinician. Review of raw data. Scoring. Report writing of individual tests administered by Clinician. Integration of individual tests administered by psychometrist with NSE/testing by clinician, review of records/history/collaborative information, case Conceptualization, treatment planning, clinical decision making, report writing, coordination Of Care. Psychometry test codes as time spent by psychometrist administering and scoring neurocognitive/psychological tests under supervision of neuropsychologist.  Integral services including scoring of raw data, data interpretation, case conceptualization, report writing etcetera were initiated after the patient finished testing/raw data collected and was completed on the date the report was signed. This note will not be viewable in [address] This note was created using voice recognition software. Despite editing, there may be syntax errors.      I have reviewed the documentation provided by Dr. Ether Collet, PhD, Jalen Leahy. Dr. Tasha Conway is in her second year of Fellowship in Clinical Neuropsychology. Dr. Mai Rothman is licensed and credentialed to practice in the [facility], is providing the current services via her NPI and licensure, and had been providing similar services prior to her employment with [address] [place] [address]. No additional insurance billing is done by me on her cases, my NPI is not being used, etc.   I have not had any face to face engagement with her patients, and am providing supervision and consultation services with her as she works towards advancing her career and subspecialities. I have reviewed the history, the neurocognitive and psychological test results, the medical records available, and the impressions and recommendations generated by Dr. Mai Rothman. We have engaged in peer to peer supervision as needed. I have reviewed history noted in the records, the tests administered, the test scores, and the overall case history and profile and report generated by Dr. Mai Rothman. Dr. Corado Massed clinical case formulation, diagnostic impressions, and the proposed management plans/treatment recommendations are her own and based on her clinical training, level of expertise, and judgment. Any additional comments, concerns, or recommendations that I am making are offered here:   I agree with the findings and recommendations above. Noted that she has dementia and this does not mean she is not competent, but this will need to be monitored over time, and wise to establish POA while she is able to do so as opposed to allowing a dementia to probably progress to when she cannot. Follow closely. Andre Mckeon, AFSHIN  Neuropsychology

## 2020-03-25 ENCOUNTER — HOSPITAL ENCOUNTER (OUTPATIENT)
Dept: PHYSICAL THERAPY | Age: 85
Discharge: HOME | End: 2020-03-25
Payer: MEDICARE

## 2020-03-25 PROCEDURE — 97116 GAIT TRAINING THERAPY: CPT

## 2020-03-25 PROCEDURE — 97110 THERAPEUTIC EXERCISES: CPT

## 2020-03-25 NOTE — PROGRESS NOTES
PT DAILY TREATMENT NOTE    Patient Name: Yarely Wells  Date:3/25/2020  : 1930  [x]  Patient  Verified  Payor: [place] MED[place]ARE / Plan: [place] MEDICARE PART A & B / Product Type: Medicare /    In time:1045  Out time:1150  Total Treatment Time (min): 65  Total Timed Codes (min): 65  1:1 Treatment Time (MC/BCBS only): 65    Visit #: 44 of 50    Treatment Area:  Other abnormalities of gait and mobility [R26.89]    SUBJECTIVE  Pain Level (0-10 scale): 0  Any medication changes, allergies to medications, adverse drug reactions, diagnosis change, or new procedure performed?: [x] No    [] Yes (see summary sheet for update)  Subjective functional status/changes:   [x] No changes reported      OBJECTIVE        35 min Therapeutic Exercise:  [x] See flow sheet :standing, stepping, balance activities with focus on improved right leg WB   Rationale: increase ROM and increase strength to improve the patients ability to **      30 min Gait Trainin feet with RW device on level surfaces with min level of assist   Rationale:improve functional gait/endurance for ADL          With   [x] TE   [] TA   [] neuro   [] other: Patient Education: [x] Review HEP    [] Progressed/Changed HEP based on:   [] positioning   [] body mechanics   [] transfers   [] heat/ice application    [] other:      Other Objective/Functional Measures: requires multiple rest periods  Patient performed step up fwd onto 6 in box leading with left leg, step down leading with right leg x2, needs frequent VC/encouragement     Pain Level (0-10 scale) post treatment: 5    ASSESSMENT/Changes in Function: patient participating well with intermittent c/o right hip pain and requesting for rest period    Patient will continue to benefit from skilled PT services to address functional mobility deficits, address ROM deficits, address strength deficits, analyze and address soft tissue restrictions, analyze and cue movement patterns and assess and modify postural abnormalities to attain remaining goals. [x]  See Plan of Care  []  See progress note/recertification  []  See Discharge Summary         Progress towards goals / Updated goals:  Long Term Goals: LTG- To be accomplished in 16 treatment(s):  1.  Pt will be able to enter/exit clinic with FW RW and walk with step through gait pattern to indicate improved community intergration. Eval:entered with WC, step to gait pattern   Last PN:walked from gym to outside of clinic with mod A for RW advancement however still step to pattern  Current: 14min 30 seconds to walk with RW from parallel bars to car in parking lot, SBA with cues occasionally to advance RW before right LE, still step to pattern progressing 3/19/20       2.  Pt will improve 5 time sit to  less than 30 sec Mod I to indicate improved mobility and functional strength. Eval:5 time sit to stand: able to complete 3 times at 30.3 sec from 20 in with bilateral UE  Last PN:  Able to perform 5 sit to stands without rest from [facility]  Current: Progressing 20 unsure as to if 30 sec is realistic goal, increased tolerance to sit to stands can complete 5 without stopping from WC in 90.4 sec   2. Updated to 5 sit to stands from [facility] in less than 1 min Mod I to indicate improved mobility and functional strength  Last PN[de-identified] 41 secs with improved ability to transition between reps goal MET     3.  Pt will improve TUG to 30 sec with FW RW and mod I to indicate improved mobility. Eval:TU.56 sec with FW RW and CGA with step to gait pattern   Last PN: TUG: trial 1: 104 sec with RW, SBA and step through gait, took 20 sec to stand  Trial 2: 96 sec with RW and SBA and step through, took 19 sec to stand  Current: 4 min 3 seconds first  Trial, 2 min 25 sec second trials, 15 seconds sit to stand with increased time with turns   3. Updated: TUG in less than 94 sec with RW W RW and mod I to indicate improved mobility.   Last PN: 53.75 seconds goal MET     Updated goals 2/19/2020  1. Pt will be able to stand > 10 minutes before fatigue to demonstrate improved standing endurance for ADLs.    Last PN:: able to stand for > 5 minutes before rest today   Current: patient demonstrating ability to perform various standing activities for 5 min 35 sec with reports of right hip pain, progressing     2. Pt will be able to complete 8 alternating taps on step with UE assist consecutively to demonstrate improved weight shifting onto right LE. Last PN:  5 steps on same side with breaks in between, increased time lifting left LE  Last PN: able to complete 8 alternating to cones goal MET        Updated goals 3/13/2020:  1. Pt will be able to complete 3 steps ascending forwards and backwards with right HR to be able to enter through front of house  Last PN: challenged with step up onto 2in step in parallel bars  Current: able to complete 4 in step today with ascending and descending forwards 3/19/20  Status on 3/23/20: ability to negotiate 6 in step up/down x 2, progressing     2.  Pt will be able to complete TUG in < 45 seconds with RW to demonstrate improved functional gait speed  Last PN: 53.75 seconds with RW  Current: nt       PLAN  []  Upgrade activities as tolerated     [x]  Continue plan of care  []  Update interventions per flow sheet       []  Discharge due to:_  []  Other:_      Dariusz Marie PT 3/25/2020  10:56 AM    Future Appointments   Date Time Provider Gayle Valencia   3/27/2020 11:00 AM Hugo OvertonHPTBMELANY [facility]   3/31/2020  1:00 PM Yas Flores PT, DPT [facility]   4/3/2020 11:00 AM Yas Flores PT, DPT [facility]   4/7/2020  1:00 PM Alon Vazquez, PHD [address]   4/8/2020 11:30 AM Yas Flores PT, DPT [facility]   4/10/2020 11:45 AM [facility] Flores PT, [facility] [place]   4/14/2020 12:15 PM Yas Flores, PT, DPT [facility]   4/17/2020 11:45 AM Yas Flores PT, MARCO MIHPANN [facility] room air

## 2020-03-27 ENCOUNTER — HOSPITAL ENCOUNTER (OUTPATIENT)
Dept: PHYSICAL THERAPY | Age: 85
Discharge: HOME | End: 2020-03-27
Payer: MEDICARE

## 2020-03-27 PROCEDURE — 97116 GAIT TRAINING THERAPY: CPT

## 2020-03-27 PROCEDURE — 97530 THERAPEUTIC ACTIVITIES: CPT

## 2020-03-27 PROCEDURE — 97110 THERAPEUTIC EXERCISES: CPT

## 2020-03-27 NOTE — PROGRESS NOTES
PT DAILY TREATMENT NOTE    Patient Name: Bg Mcmahon  Date:3/27/2020  : 1930  [x]  Patient  Verified  Payor: Rica Villa / Plan: [place] MEDICARE PART A & B / Product Type: Medicare /    In time:12:10  Out time:1:15  Total Treatment Time (min): 65  Total Timed Codes (min): 65  1:1 Treatment Time ( [address] only): 65   Visit #: 40 of 50    Treatment Area: Other abnormalities of gait and mobility [R26.89]    SUBJECTIVE  Pain Level (0-10 scale): 4  Any medication changes, allergies to medications, adverse drug reactions, diagnosis change, or new procedure performed?: [x] No    [] Yes (see summary sheet for update)  Subjective functional status/changes:   [] No changes reported  \"the usual.\"    OBJECTIVE      30 min Therapeutic Exercise:  [x] See flow sheet :   Rationale: increase ROM and increase strength to improve the patients ability to perform daily activities with decreased pain and symptom levels    20 min Therapeutic Activity:  [x]  See flow sheet :   Rationale: improve coordination, improve balance and increase proprioception  to improve the patients ability to perform daily activities with decreased pain and symptom levels     15 min Gait Training:  Supervision with walking from stairs to front door of office, only correction x2 to decrease short step on right   Rationale: To improve ambulation safety and efficiency in order to improve patient's ability to safely ambulate at home for self care.      With   [] TE   [] TA   [] neuro   [] other: Patient Education: [x] Review HEP    [] Progressed/Changed HEP based on:   [] positioning   [] body mechanics   [] transfers   [] heat/ice application    [] other:      Other Objective/Functional Measures:   Challenged with 1 UE assist with step ups in parallel bars and stairs in clinic  Rest breaks due to fatigue and low back pain    Pain Level (0-10 scale) post treatment: 5    ASSESSMENT/Changes in Function: Pt needing less cues with walking with RW today with ability to take more consecutive steps. Very fatigued and challenged with step ups especially with 1 UE assist.  TUG improving slightly with bigger step length. Patient will continue to benefit from skilled PT services to modify and progress therapeutic interventions, address functional mobility deficits, address strength deficits, analyze and cue movement patterns, analyze and modify body mechanics/ergonomics, assess and modify postural abnormalities, address imbalance/dizziness and instruct in home and community integration to attain remaining goals. []  See Plan of Care  []  See progress note/recertification  []  See Discharge Summary         Progress towards goals / Updated goals:  Long Term Goals: LTG- To be accomplished in 16 treatment(s):  1.  Pt will be able to enter/exit clinic with FW RW and walk with step through gait pattern to indicate improved community intergration. Eval:entered with WC, step to gait pattern   Last PN:walked from gym to outside of clinic with mod A for RW advancement however still step to pattern  Current: 14min 30 seconds to walk with RW from parallel bars to car in parking lot, SBA with cues occasionally to advance RW before right LE, still step to pattern progressing 3/19/20       2.  Pt will improve 5 time sit to  less than 30 sec Mod I to indicate improved mobility and functional strength.   Eval:5 time sit to stand: able to complete 3 times at 30.3 sec from 20 in with bilateral UE  Last PN:  Able to perform 5 sit to stands without rest from [facility]  Current: Progressing 1/30/20 unsure as to if 30 sec is realistic goal, increased tolerance to sit to stands can complete 5 without stopping from WC in 90.4 sec   2. Updated to 5 sit to stands from [facility] in less than 1 min Mod I to indicate improved mobility and functional strength  Last PN[de-identified] 41 secs with improved ability to transition between reps goal MET     3.  Pt will improve TUG to 30 sec with FW RW and mod I to indicate improved mobility. Eval:TU.56 sec with FW RW and CGA with step to gait pattern   Last PN: TUG: trial 1: 104 sec with RW, SBA and step through gait, took 20 sec to stand  Trial 2: 96 sec with RW and SBA and step through, took 19 sec to stand  Current: 4 min 3 seconds first  Trial, 2 min 25 sec second trials, 15 seconds sit to stand with increased time with turns   3. Updated: TUG in less than 94 sec with RW W RW and mod I to indicate improved mobility. Last PN: 53.75 seconds goal MET     Updated goals 2020  1. Pt will be able to stand > 10 minutes before fatigue to demonstrate improved standing endurance for ADLs.    Last PN:: able to stand for > 5 minutes before rest today   Current: patient demonstrating ability to perform various standing activities for 5 min 35 sec with reports of right hip pain, progressing     2. Pt will be able to complete 8 alternating taps on step with UE assist consecutively to demonstrate improved weight shifting onto right LE. Last PN:  5 steps on same side with breaks in between, increased time lifting left LE  Last PN: able to complete 8 alternating to cones goal MET        Updated goals 3/13/2020:  1. Pt will be able to complete 3 steps ascending forwards and backwards with right HR to be able to enter through front of house  Last PN: challenged with step up onto 2in step in parallel bars  Current: able to complete 4 in step today with ascending and descending forwards 3/19/20  Status on 3/23/20: ability to negotiate 6 in step up/down x 2, progressing     2.  Pt will be able to complete TUG in < 45 seconds with RW to demonstrate improved functional gait speed  Last PN: 53.75 seconds with RW  Current: 57 seconds first trial, 52.72 sec second trial with RW progressing 3/27/2020     PLAN  [x]  Upgrade activities as tolerated     [x]  Continue plan of care  []  Update interventions per flow sheet       []  Discharge due to:_  []  Other:_      Darshana Ward Remesi 3/27/2020  12:39 PM    Future Appointments   Date Time Provider Gayle Annie   3/31/2020  1:00 PM Aiyana Gabriel PT, DPT [facility]   4/3/2020 11:00 AM Aiyana Gabriel PT, DPT [facility]   4/7/2020  1:00 PM Nelia Hannah, PHD [address]   4/8/2020 11:30 AM Aiyana Gabriel PT, DPT [facility]   4/10/2020 11:45 AM Aiyana Gabriel PT, DPT [facility]   4/14/2020 12:15 PM Aiyana Gabriel PT, DPT [facility]   4/17/2020 11:45 AM Aiyana Gabriel PT, DPT [facility]

## 2020-03-31 ENCOUNTER — APPOINTMENT (OUTPATIENT)
Dept: PHYSICAL THERAPY | Age: 85
End: 2020-03-31
Payer: MEDICARE

## 2020-04-03 ENCOUNTER — APPOINTMENT (OUTPATIENT)
Dept: PHYSICAL THERAPY | Age: 85
End: 2020-04-03

## 2020-04-08 ENCOUNTER — APPOINTMENT (OUTPATIENT)
Dept: PHYSICAL THERAPY | Age: 85
End: 2020-04-08

## 2020-04-10 ENCOUNTER — APPOINTMENT (OUTPATIENT)
Dept: PHYSICAL THERAPY | Age: 85
End: 2020-04-10

## 2020-04-14 ENCOUNTER — APPOINTMENT (OUTPATIENT)
Dept: PHYSICAL THERAPY | Age: 85
End: 2020-04-14

## 2020-04-17 ENCOUNTER — APPOINTMENT (OUTPATIENT)
Dept: PHYSICAL THERAPY | Age: 85
End: 2020-04-17

## 2020-06-23 ENCOUNTER — OFFICE VISIT (OUTPATIENT)
Dept: NEUROLOGY | Age: 85
End: 2020-06-23

## 2020-06-23 DIAGNOSIS — G30.9 MIXED DEMENTIA (HCC): Primary | ICD-10-CM

## 2020-06-23 DIAGNOSIS — F02.80 MIXED DEMENTIA (HCC): Primary | ICD-10-CM

## 2020-06-23 DIAGNOSIS — F01.50 MIXED DEMENTIA (HCC): Primary | ICD-10-CM

## 2020-06-23 NOTE — PROGRESS NOTES
Interactive Psychotherapy/office feedback        Interactive office feedback session with Ms. Hughes and her adult son. I reviewed the results of the recent Neuropsychological Evaluation  including the observed areas of neurocognitive strengths and weaknesses. Education was provided regarding my diagnostic impressions, and treatment plan/options were discussed. I also answered numerous questions related to the clinical findings, including the various methods to improve cognition and mood. CBT, psychoeducation, and supportive psychotherapy techniques were utilized. Prior to seeing the patient I reviewed the records, including the previously completed report, the records in [place], and any updated visits from other providers since I saw the patient last.       Diagnoses:      1. Mixed Dementia       The patient will follow up with the referring provider, and reported being very pleased with the services provided. [id number] with patient and her adult son. 45 minutes     This note will not be viewable in [place]. This note was created using voice recognition software. Despite editing, there may be syntax errors.
negative...

## 2020-08-12 NOTE — PROGRESS NOTES
[facility] at the [address], [place], [address] [facility]  Phone: [phone]      Fax:  [phone]    Discharge Summary    Patient name: Alina Hughes Start of Care: 12/17/2019   Referral source: Atilio Donato MD HOX: 20/8/8662               Medical Diagnosis: Other abnormalities of gait and mobility [R26.89]    Onset Date:DOI August 2019               Treatment Diagnosis: Gait Dysfunction    Prior Hospitalization: see medical history Provider#: [id number]   Medications: Verified on Patient summary List    Comorbidities: HTN, Allergies, Arthritis    Prior Level of Function: Prior to fall \"I just started using a cane\", Mod I with all ADLs     Medications: Verified on Patient Summary List    Visits from Start of Care: 40    Missed Visits: 0  Reporting Period : 12/17/19 to 3/27/2020    Long Term Goals: LTG- To be accomplished in 16 treatment(s):  1.  Pt will be able to enter/exit clinic with FW RW and walk with step through gait pattern to indicate improved community intergration. Eval:entered with WC, step to gait pattern   Last PN:walked from gym to outside of clinic with mod A for RW advancement however still step to pattern  Current: 14min 30 seconds to walk with RW from parallel bars to car in parking lot, SBA with cues occasionally to advance RW before right LE, still step to pattern progressing 3/19/20       2.  Pt will improve 5 time sit to  less than 30 sec Mod I to indicate improved mobility and functional strength.   Eval:5 time sit to stand: able to complete 3 times at 30.3 sec from 20 in with bilateral UE  Last PN:  Able to perform 5 sit to stands without rest from [facility]  Current: Progressing 1/30/20 unsure as to if 30 sec is realistic goal, increased tolerance to sit to stands can complete 5 without stopping from WC in 90.4 sec   2. Updated to 5 sit to stands from [facility] in less than 1 min Mod I to indicate improved mobility and functional strength  Last PN[de-identified] 41 secs with improved ability to transition between reps goal MET     3.  Pt will improve TUG to 30 sec with FW RW and mod I to indicate improved mobility. Eval:TU.56 sec with FW RW and CGA with step to gait pattern   Last PN: TUG: trial 1: 104 sec with RW, SBA and step through gait, took 20 sec to stand  Trial 2: 96 sec with RW and SBA and step through, took 19 sec to stand  Current: 4 min 3 seconds first  Trial, 2 min 25 sec second trials, 15 seconds sit to stand with increased time with turns   3. Updated: TUG in less than 94 sec with RW W RW and mod I to indicate improved mobility. Last PN: 53.75 seconds goal MET     Updated goals 2020  1. Pt will be able to stand > 10 minutes before fatigue to demonstrate improved standing endurance for ADLs.    Last PN:: able to stand for > 5 minutes before rest today   Current: patient demonstrating ability to perform various standing activities for 5 min 35 sec with reports of right hip pain, progressing     2. Pt will be able to complete 8 alternating taps on step with UE assist consecutively to demonstrate improved weight shifting onto right LE. Last PN:  5 steps on same side with breaks in between, increased time lifting left LE  Last PN: able to complete 8 alternating to cones goal MET        Updated goals 3/13/2020:  1. Pt will be able to complete 3 steps ascending forwards and backwards with right HR to be able to enter through front of house  Last PN: challenged with step up onto 2in step in parallel bars  Current: able to complete 4 in step today with ascending and descending forwards 3/19/20  Status on 3/23/20: ability to negotiate 6 in step up/down x 2, progressing     2.  Pt will be able to complete TUG in < 45 seconds with RW to demonstrate improved functional gait speed  Last PN: 53.75 seconds with RW  Current: 57 seconds first trial, 52.72 sec second trial with RW progressing 3/27/2020      Assessment/ Summary of Care: Pt presented to therapy with C/C of decreased mobility and right hip pain. Per pts son is S/P Right Tibia ORIF following fall in August when was walking inside home with FW RW. Pt attended 40 sessions including initial eval with making progress towards goals however continued decreased dania with walking with RW needing cues to take consecutive steps. Pt is ready to be DC at this time due to ulcer sx with getting HHPT at this time.      RECOMMENDATIONS:  [x]Discontinue therapy: []Patient has reached or is progressing toward set goals      [x]Patient is non-compliant or has abdicated      []Due to lack of appreciable progress towards set goals    Rain Cisnerosesiadi 8/12/2020 2:09 PM

## 2020-09-24 ENCOUNTER — TELEPHONE (OUTPATIENT)
Dept: NEUROLOGY | Age: 85
End: 2020-09-24

## 2020-11-19 ENCOUNTER — TELEPHONE (OUTPATIENT)
Dept: NEUROLOGY | Age: 85
End: 2020-11-19

## 2020-11-19 NOTE — TELEPHONE ENCOUNTER
[facility] requests a copy of Dr. vAa Chan CV faxed to: [phone]. Vickie #: [id number].

## 2022-06-02 ENCOUNTER — TRANSCRIBE ORDER (OUTPATIENT)
Dept: REGISTRATION | Age: 87
End: 2022-06-02

## 2022-06-02 ENCOUNTER — HOSPITAL ENCOUNTER (OUTPATIENT)
Dept: PREADMISSION TESTING | Age: 87
Discharge: HOME | End: 2022-06-02
Payer: MEDICARE

## 2022-06-02 DIAGNOSIS — M16.11 PRIMARY OSTEOARTHRITIS OF RIGHT HIP: Primary | ICD-10-CM

## 2022-06-02 DIAGNOSIS — M16.11 PRIMARY OSTEOARTHRITIS OF RIGHT HIP: ICD-10-CM

## 2022-06-02 LAB
ALBUMIN SERPL-MCNC: 2.9 G/DL (ref 3.4–5)
ALBUMIN/GLOB SERPL: 0.7 {RATIO} (ref 0.8–1.7)
ALP SERPL-CCNC: 79 U/L (ref 45–117)
ALT SERPL-CCNC: 16 U/L (ref 13–56)
ANION GAP SERPL CALC-SCNC: 4 MMOL/L (ref 3–18)
APTT PPP: 22.8 SEC (ref 23–36.4)
AST SERPL-CCNC: 22 U/L (ref 10–38)
ATRIAL RATE: 80 BPM
BASOPHILS # BLD: 0 K/UL (ref 0–0.1)
BASOPHILS NFR BLD: 0 % (ref 0–2)
BILIRUB SERPL-MCNC: 0.4 MG/DL (ref 0.2–1)
BUN SERPL-MCNC: 16 MG/DL (ref 7–18)
BUN/CREAT SERPL: 24 (ref 12–20)
CALCIUM SERPL-MCNC: 9.4 MG/DL (ref 8.5–10.1)
CALCULATED P AXIS, ECG09: 55 DEGREES
CALCULATED R AXIS, ECG10: 75 DEGREES
CALCULATED T AXIS, ECG11: 31 DEGREES
CHLORIDE SERPL-SCNC: 102 MMOL/L (ref 100–111)
CO2 SERPL-SCNC: 31 MMOL/L (ref 21–32)
CREAT SERPL-MCNC: 0.68 MG/DL (ref 0.6–1.3)
DIAGNOSIS, 93000: NORMAL
DIFFERENTIAL METHOD BLD: ABNORMAL
EOSINOPHIL # BLD: 0.2 K/UL (ref 0–0.4)
EOSINOPHIL NFR BLD: 2 % (ref 0–5)
ERYTHROCYTE [DISTWIDTH] IN BLOOD BY AUTOMATED COUNT: 14.6 % (ref 11.6–14.5)
ERYTHROCYTE [SEDIMENTATION RATE] IN BLOOD: 60 MM/HR (ref 0–30)
EST. AVERAGE GLUCOSE BLD GHB EST-MCNC: 120 MG/DL
GLOBULIN SER CALC-MCNC: 4.4 G/DL (ref 2–4)
GLUCOSE SERPL-MCNC: 86 MG/DL (ref 74–99)
HBA1C MFR BLD: 5.8 % (ref 4.2–5.6)
HCT VFR BLD AUTO: 40 % (ref 35–45)
HGB BLD-MCNC: 12.6 G/DL (ref 12–16)
IMM GRANULOCYTES # BLD AUTO: 0.1 K/UL (ref 0–0.04)
IMM GRANULOCYTES NFR BLD AUTO: 1 % (ref 0–0.5)
INR PPP: 1 (ref 0.8–1.2)
LYMPHOCYTES # BLD: 1.3 K/UL (ref 0.9–3.6)
LYMPHOCYTES NFR BLD: 15 % (ref 21–52)
MCH RBC QN AUTO: 31.2 PG (ref 24–34)
MCHC RBC AUTO-ENTMCNC: 31.5 G/DL (ref 31–37)
MCV RBC AUTO: 99 FL (ref 78–100)
MONOCYTES # BLD: 0.7 K/UL (ref 0.05–1.2)
MONOCYTES NFR BLD: 7 % (ref 3–10)
NEUTS SEG # BLD: 6.6 K/UL (ref 1.8–8)
NEUTS SEG NFR BLD: 75 % (ref 40–73)
NRBC # BLD: 0 K/UL (ref 0–0.01)
NRBC BLD-RTO: 0 PER 100 WBC
P-R INTERVAL, ECG05: 180 MS
PLATELET # BLD AUTO: 223 K/UL (ref 135–420)
PMV BLD AUTO: 10.3 FL (ref 9.2–11.8)
POTASSIUM SERPL-SCNC: 4.5 MMOL/L (ref 3.5–5.5)
PROT SERPL-MCNC: 7.3 G/DL (ref 6.4–8.2)
PROTHROMBIN TIME: 13.6 SEC (ref 11.5–15.2)
Q-T INTERVAL, ECG07: 398 MS
QRS DURATION, ECG06: 106 MS
QTC CALCULATION (BEZET), ECG08: 459 MS
RBC # BLD AUTO: 4.04 M/UL (ref 4.2–5.3)
SODIUM SERPL-SCNC: 137 MMOL/L (ref 136–145)
VENTRICULAR RATE, ECG03: 80 BPM
WBC # BLD AUTO: 8.9 K/UL (ref 4.6–13.2)

## 2022-06-02 PROCEDURE — 80053 COMPREHEN METABOLIC PANEL: CPT

## 2022-06-02 PROCEDURE — 85025 COMPLETE CBC W/AUTO DIFF WBC: CPT

## 2022-06-02 PROCEDURE — 36415 COLL VENOUS BLD VENIPUNCTURE: CPT

## 2022-06-02 PROCEDURE — 93005 ELECTROCARDIOGRAM TRACING: CPT

## 2022-06-02 PROCEDURE — 85730 THROMBOPLASTIN TIME PARTIAL: CPT

## 2022-06-02 PROCEDURE — 85652 RBC SED RATE AUTOMATED: CPT

## 2022-06-02 PROCEDURE — 85610 PROTHROMBIN TIME: CPT

## 2022-06-02 PROCEDURE — 83036 HEMOGLOBIN GLYCOSYLATED A1C: CPT

## 2022-06-03 LAB
BACTERIA SPEC CULT: NORMAL
BACTERIA SPEC CULT: NORMAL
SERVICE CMNT-IMP: NORMAL

## 2022-06-06 ENCOUNTER — HOSPITAL ENCOUNTER (OUTPATIENT)
Dept: PREADMISSION TESTING | Age: 87
Discharge: HOME | End: 2022-06-06

## 2022-06-06 VITALS — WEIGHT: 105 LBS | BODY MASS INDEX: 17.93 KG/M2 | HEIGHT: 64 IN

## 2022-06-06 RX ORDER — LANOLIN ALCOHOL/MO/W.PET/CERES
325 CREAM (GRAM) TOPICAL
COMMUNITY

## 2022-06-06 RX ORDER — ALBUTEROL SULFATE 90 UG/1
2 AEROSOL, METERED RESPIRATORY (INHALATION)
COMMUNITY

## 2022-06-06 RX ORDER — CEFAZOLIN SODIUM/WATER 2 G/20 ML
2 SYRINGE (ML) INTRAVENOUS ONCE
Status: CANCELLED | OUTPATIENT
Start: 2022-06-06 | End: 2022-06-06

## 2022-06-06 RX ORDER — FUROSEMIDE 20 MG/1
20 TABLET ORAL 2 TIMES DAILY
COMMUNITY

## 2022-06-06 RX ORDER — POTASSIUM CHLORIDE 20MEQ/15ML
20 LIQUID (ML) ORAL DAILY
COMMUNITY

## 2022-06-06 RX ORDER — ACETAMINOPHEN 500 MG
2000 TABLET ORAL
COMMUNITY
End: 2022-06-30

## 2022-06-06 RX ORDER — SODIUM CHLORIDE, SODIUM LACTATE, POTASSIUM CHLORIDE, CALCIUM CHLORIDE 600; 310; 30; 20 MG/100ML; MG/100ML; MG/100ML; MG/100ML
125 INJECTION, SOLUTION INTRAVENOUS CONTINUOUS
Status: CANCELLED | OUTPATIENT
Start: 2022-06-06

## 2022-06-06 NOTE — PERIOP NOTES
No sleep apnea, removable prosthetic devices or family history of malignant hyperthermia. CHG kit and instructions given and reviewed. PCP is aware of the surgery. No participation in clinical trial or research study. Do not bring any valuables on DOS- jewelry, wallet, cash, laptop, medications. Does not meet criteria for special population at this time. Possible time delay day of surgery reviewed. Covid card-Media DNR status-none. Patient's son Zia  aware to bring patient sometime this week or early next week for UA.

## 2022-06-21 NOTE — NURSE NAVIGATOR
Oma Scanlon watched the pre op seminar online and received a preoperative education booklet in anticipation of surgery    Nurse Navigator

## 2022-06-23 PROBLEM — M16.11 PRIMARY LOCALIZED OSTEOARTHRITIS OF RIGHT HIP: Chronic | Status: ACTIVE | Noted: 2022-06-23

## 2022-06-23 PROBLEM — M16.11 PRIMARY LOCALIZED OSTEOARTHRITIS OF RIGHT HIP: Status: ACTIVE | Noted: 2022-06-23

## 2022-06-23 NOTE — H&P
[address] [facility] [address]  History and Physical Exam    Patient: Cami Mccain MRN: [id number]  SSN: [id number]    YOB: 1930  Age: 80 y.o. Sex: female      Subjective:      Chief Complaint: right hip pain    History of Present Illness:  Patient complains of right hip pain and difficulty ambulating, which has progressed over the past several months. X-rays showed osteoarthritis of the joint. The patient's pain has persisted and progressed despite conservative treatments and therapies. The patient has been previously treated with nsaids. The patient has at this time opted for surgical intervention. Past Medical History:   Diagnosis Date    Asthma     Cancer ([address]     skin cancer-melanomas    GERD (gastroesophageal reflux disease)     Hypertension     OA (osteoarthritis) 2012    Right hip, knee    Primary localized osteoarthritis of right hip 6/23/2022    Scoliosis     of spine     Past Surgical History:   Procedure Laterality Date    HX GI  2020    upper endoscopy to repair bleeding ulcer    HX GI  2007    Colonoscopy-Dr. Fidel Laird    HX HEENT Bilateral 2002    Lasic surgery    HX HEENT  2021    Molar extraction by oral surgeon    HX ORTHOPAEDIC Right 2019    leg surg with hardware after fall-Dr. Bhumika Ayoub HX OTHER SURGICAL      multiple skin cancer removal     Social History     Occupational History    Not on file   Tobacco Use    Smoking status: Never Smoker    Smokeless tobacco: Never Used   Vaping Use    Vaping Use: Never used   Substance and Sexual Activity    Alcohol use: Not Currently    Drug use: Never    Sexual activity: Not on file     Prior to Admission medications    Medication Sig Start Date End Date Taking? Authorizing Provider   SUCRALFATE PO Take 15 mL by mouth daily. 1mg/10ml   Indications: for ulcers    Provider, Historical   potassium chloride (KAON 10%) 20 mEq/15 mL solution Take 20 mEq by mouth daily.  Indications: prevention of low potassium in the blood    Provider, Historical   furosemide (Lasix) 20 mg tablet Take 20 mg by mouth two (2) times a day. Indications: high blood pressure    Provider, Historical   KRILL OIL-OMEGA-3-DHA-EPA PO Take 350 mg by mouth daily. Provider, Historical   cholecalciferol (VITAMIN D3) (2,000 UNITS /50 MCG) cap capsule Take 2,000 Units by mouth nightly. Provider, Historical   OTHER,NON-FORMULARY, Take 1 Capsule by mouth daily. Prevagin    Provider, Historical   ferrous sulfate (Iron) 325 mg (65 mg iron) tablet Take 325 mg by mouth Daily (before breakfast). Provider, Historical   OTHER,NON-FORMULARY, Take 1 Tablet by mouth two (2) times a day. Nature's Bounty Calcium, Mg, Zinc with Vitamin D3    Provider, Historical   b complex-vitamin c-folic acid (NEPHROCAPS) 1 mg capsule Take 1 Capsule by mouth daily. Provider, Historical   albuterol (PROVENTIL HFA, VENTOLIN HFA, PROAIR HFA) 90 mcg/actuation inhaler Take 2 Puffs by inhalation every four (4) hours as needed for Wheezing or Shortness of Breath. Provider, Historical       Allergies: Allergies   Allergen Reactions    Ace Inhibitors Unknown (comments)     Patient and son unaware of the reaction.  Terramycin [Oxytetracycline] Rash        Review of Systems:  A comprehensive review of systems was negative except for that written in the History of Present Illness. Objective:       Physical Exam:  HEENT: Normocephalic, atraumatic  Lungs:  Clear to auscultation  Heart:   Regular rate and rhythm  Abdomen: Soft  Extremities:  Pain with range of motion of the right hip. Passive flexion  degrees,                       passive internal rotation 0-10 degrees, with pain throughout ROM,                        passive external rotation 10-20 degrees with pain at the arc of motion. Antalgic gait noted. Assessment:      Arthritis of the right hip. Plan:       Proceed with scheduled RIGHT TOTAL HIP ARTHROPLASTY.     The various methods of treatment have been discussed with the patient and family. After consideration of risks, benefits, and other options for treatment, the patient has consented to surgical interventions. Questions were answered and preoperative teaching was done by Dr Colin Sweeney.      Signed By: JALEEL Land     June 23, 2022

## 2022-06-23 NOTE — DISCHARGE INSTRUCTIONS
[address] [place] [address] Sports Medicine   Patient Discharge Instructions    Suyapa Bolanos / [id number] : 1930    Admitted (Not on file) Discharged: 2022     IF YOU HAVE ANY PROBLEMS ONCE YOU ARE  [facility]:   Main office number: [phone]    Your follow up appointment to see either Dr. Devyn Mason PA-C, or [facility] ARS[facility] as scheduled in 2 weeks. If you are unsure of your appointment date call the office at [phone]. Medication Instructions     · Resume your home medictions as directed, you may have directed not to resume supplements until after your follow up. · A prescription for pain medication has been given   · It is important that you take the medication exactly as they are prescribed. · Keep your medication in the bottles provided by the pharmacist and keep a list of the medication names, dosages, and times to be taken in your wallet. · Do not take other medications without consulting your doctor. What to do at [address] [place] [address] Ave your prehospital diet. If you have excessive nausea or vomitting call your doctor's office. Be sure to maintain adequate fluid intake. Some pain medications may cause constipation. Remember to drink fluids, stay as active as possible, and eat plenty of fiber-rich foods. Begin In-Home Physical Therapy; 3 times a week to work on gait training, range of motion, strengthening, and weight bearing exercises as tolerable. Continue to use your walker or cane when walking. May progress from the walker to a cane to complete total bearing as tolerable. Patient may shower. Wrap incision with plastic wrap/covering to prevent incision from getting wet. Avoid complete immersion. YOUR DRESSING SHOULD BE CHANGED BY YOUR HOME HEALTH NURSE 5-7 AFTER SURGERY ACCORDING TO THE DATE WRITTEN ON YOUR DRESSING.           When to Call    - Call if you have a temperature greater then 101  - Unable to keep food down  - Are unable to bear any wieght   - Need a pain medication refill     Information obtained by :  I understand that if any problems occur once I am at home I am to contact my physician. I understand and acknowledge receipt of the instructions indicated above.                                                                                                                                            Physician's or R.N.'s Signature                                                                  Date/Time                                                                                                                                              Patient or Representative Signature                                                          Date/Time

## 2022-06-27 ENCOUNTER — APPOINTMENT (OUTPATIENT)
Dept: GENERAL RADIOLOGY | Age: 87
DRG: 470 | End: 2022-06-27
Attending: ORTHOPAEDIC SURGERY
Payer: MEDICARE

## 2022-06-27 ENCOUNTER — HOME HEALTH ADMISSION (OUTPATIENT)
Dept: HOME HEALTH SERVICES | Facility: HOME HEALTH | Age: 87
End: 2022-06-27
Payer: MEDICARE

## 2022-06-27 ENCOUNTER — APPOINTMENT (OUTPATIENT)
Dept: GENERAL RADIOLOGY | Age: 87
DRG: 470 | End: 2022-06-27
Attending: PHYSICIAN ASSISTANT
Payer: MEDICARE

## 2022-06-27 ENCOUNTER — HOSPITAL ENCOUNTER (INPATIENT)
Age: 87
LOS: 2 days | Discharge: HOME HEALTH | DRG: 470 | End: 2022-06-29
Attending: ORTHOPAEDIC SURGERY | Admitting: ORTHOPAEDIC SURGERY
Payer: MEDICARE

## 2022-06-27 ENCOUNTER — ANESTHESIA (OUTPATIENT)
Dept: SURGERY | Age: 87
DRG: 470 | End: 2022-06-27
Payer: MEDICARE

## 2022-06-27 ENCOUNTER — ANESTHESIA EVENT (OUTPATIENT)
Dept: SURGERY | Age: 87
DRG: 470 | End: 2022-06-27
Payer: MEDICARE

## 2022-06-27 DIAGNOSIS — M16.11 PRIMARY LOCALIZED OSTEOARTHRITIS OF RIGHT HIP: Primary | Chronic | ICD-10-CM

## 2022-06-27 LAB
ABO + RH BLD: NORMAL
BLOOD GROUP ANTIBODIES SERPL: NORMAL
GLUCOSE BLD STRIP.AUTO-MCNC: 80 MG/DL (ref 70–110)
SPECIMEN EXP DATE BLD: NORMAL

## 2022-06-27 PROCEDURE — 86900 BLOOD TYPING SEROLOGIC ABO: CPT

## 2022-06-27 PROCEDURE — 77030011264 HC ELECTRD BLD EXT COVD -A: Performed by: ORTHOPAEDIC SURGERY

## 2022-06-27 PROCEDURE — 77030003666 HC NDL SPINAL BD -A: Performed by: ORTHOPAEDIC SURGERY

## 2022-06-27 PROCEDURE — 77030012893: Performed by: ORTHOPAEDIC SURGERY

## 2022-06-27 PROCEDURE — 74011000250 HC RX REV CODE- 250: Performed by: ORTHOPAEDIC SURGERY

## 2022-06-27 PROCEDURE — 76210000002 HC OR PH I REC 3 TO 3.5 HR: Performed by: ORTHOPAEDIC SURGERY

## 2022-06-27 PROCEDURE — 77030037713 HC CLOSR DEV INCIS ZIP STRY -B: Performed by: ORTHOPAEDIC SURGERY

## 2022-06-27 PROCEDURE — 74011000250 HC RX REV CODE- 250: Performed by: NURSE ANESTHETIST, CERTIFIED REGISTERED

## 2022-06-27 PROCEDURE — 77030020268 HC MISC GENERAL SUPPLY: Performed by: ORTHOPAEDIC SURGERY

## 2022-06-27 PROCEDURE — 77030031139 HC SUT VCRL2 J&J -A: Performed by: ORTHOPAEDIC SURGERY

## 2022-06-27 PROCEDURE — 77010033678 HC OXYGEN DAILY

## 2022-06-27 PROCEDURE — 74011250636 HC RX REV CODE- 250/636: Performed by: PHYSICIAN ASSISTANT

## 2022-06-27 PROCEDURE — 74011250637 HC RX REV CODE- 250/637: Performed by: PHYSICIAN ASSISTANT

## 2022-06-27 PROCEDURE — 76010000131 HC OR TIME 2 TO 2.5 HR: Performed by: ORTHOPAEDIC SURGERY

## 2022-06-27 PROCEDURE — 77030010785: Performed by: ORTHOPAEDIC SURGERY

## 2022-06-27 PROCEDURE — 77030030614 HC KT FEM BN PREP S&N -G1: Performed by: ORTHOPAEDIC SURGERY

## 2022-06-27 PROCEDURE — 77030041461 HC RETRCTR GRIPPR KUSA -C: Performed by: ORTHOPAEDIC SURGERY

## 2022-06-27 PROCEDURE — 74011250636 HC RX REV CODE- 250/636: Performed by: ORTHOPAEDIC SURGERY

## 2022-06-27 PROCEDURE — 65270000029 HC RM PRIVATE

## 2022-06-27 PROCEDURE — 77030020269 HC MISC IMPL: Performed by: ORTHOPAEDIC SURGERY

## 2022-06-27 PROCEDURE — 82962 GLUCOSE BLOOD TEST: CPT

## 2022-06-27 PROCEDURE — 97530 THERAPEUTIC ACTIVITIES: CPT

## 2022-06-27 PROCEDURE — 97162 PT EVAL MOD COMPLEX 30 MIN: CPT

## 2022-06-27 PROCEDURE — 77030020782 HC GWN BAIR PAWS FLX 3M -B: Performed by: ORTHOPAEDIC SURGERY

## 2022-06-27 PROCEDURE — 77030027138 HC INCENT SPIROMETER -A: Performed by: ORTHOPAEDIC SURGERY

## 2022-06-27 PROCEDURE — 73501 X-RAY EXAM HIP UNI 1 VIEW: CPT

## 2022-06-27 PROCEDURE — C1776 JOINT DEVICE (IMPLANTABLE): HCPCS | Performed by: ORTHOPAEDIC SURGERY

## 2022-06-27 PROCEDURE — 77030038010: Performed by: ORTHOPAEDIC SURGERY

## 2022-06-27 PROCEDURE — C1713 ANCHOR/SCREW BN/BN,TIS/BN: HCPCS | Performed by: ORTHOPAEDIC SURGERY

## 2022-06-27 PROCEDURE — 36415 COLL VENOUS BLD VENIPUNCTURE: CPT

## 2022-06-27 PROCEDURE — 76060000035 HC ANESTHESIA 2 TO 2.5 HR: Performed by: ORTHOPAEDIC SURGERY

## 2022-06-27 PROCEDURE — 2709999900 HC NON-CHARGEABLE SUPPLY: Performed by: ORTHOPAEDIC SURGERY

## 2022-06-27 PROCEDURE — 74011000250 HC RX REV CODE- 250: Performed by: PHYSICIAN ASSISTANT

## 2022-06-27 PROCEDURE — 77030012508 HC MSK AIRWY LMA AMBU -A: Performed by: STUDENT IN AN ORGANIZED HEALTH CARE EDUCATION/TRAINING PROGRAM

## 2022-06-27 PROCEDURE — 74011250636 HC RX REV CODE- 250/636: Performed by: STUDENT IN AN ORGANIZED HEALTH CARE EDUCATION/TRAINING PROGRAM

## 2022-06-27 PROCEDURE — 0SR90JA REPLACEMENT OF RIGHT HIP JOINT WITH SYNTHETIC SUBSTITUTE, UNCEMENTED, OPEN APPROACH: ICD-10-PCS | Performed by: ORTHOPAEDIC SURGERY

## 2022-06-27 PROCEDURE — 77030013708 HC HNDPC SUC IRR PULS STRY –B: Performed by: ORTHOPAEDIC SURGERY

## 2022-06-27 PROCEDURE — 77030041075 HC DRSG AG OPTIFRM MDII -B: Performed by: ORTHOPAEDIC SURGERY

## 2022-06-27 PROCEDURE — 77030040361 HC SLV COMPR DVT MDII -B: Performed by: ORTHOPAEDIC SURGERY

## 2022-06-27 PROCEDURE — 74011250636 HC RX REV CODE- 250/636: Performed by: NURSE ANESTHETIST, CERTIFIED REGISTERED

## 2022-06-27 PROCEDURE — 97166 OT EVAL MOD COMPLEX 45 MIN: CPT

## 2022-06-27 DEVICE — OR3O DUAL MOBILITY XLPE INSERT 28/42
Type: IMPLANTABLE DEVICE | Site: HIP | Status: FUNCTIONAL
Brand: OR3O DUAL MOBILITY

## 2022-06-27 DEVICE — ALPINE CENTRALIZER 	11	MM
Type: IMPLANTABLE DEVICE | Site: HIP | Status: FUNCTIONAL
Brand: ALPINE

## 2022-06-27 DEVICE — REDAPT MODULAR SHELL 54MM
Type: IMPLANTABLE DEVICE | Site: HIP | Status: FUNCTIONAL
Brand: REDAPT

## 2022-06-27 DEVICE — REDAPT LOCKING SCREW 25MM
Type: IMPLANTABLE DEVICE | Site: HIP | Status: FUNCTIONAL
Brand: REDAPT

## 2022-06-27 DEVICE — CEMENTED STEM 11 STD
Type: IMPLANTABLE DEVICE | Site: HIP | Status: FUNCTIONAL
Brand: ALPINE

## 2022-06-27 DEVICE — FEMORAL HEAD 28-12/14+0
Type: IMPLANTABLE DEVICE | Site: HIP | Status: FUNCTIONAL
Brand: DELTA CERAMIC FEMORAL HEAD

## 2022-06-27 DEVICE — OR3O DUAL MOBILITY LINER 42/54
Type: IMPLANTABLE DEVICE | Site: HIP | Status: FUNCTIONAL
Brand: OR3O DUAL MOBILITY

## 2022-06-27 DEVICE — DEPUY CMW 3 MEDIUM VISCOSITY BONE CEMENT 40G: Type: IMPLANTABLE DEVICE | Site: HIP | Status: FUNCTIONAL

## 2022-06-27 RX ORDER — TRANEXAMIC ACID 650 1/1
1950 TABLET ORAL ONCE
Status: DISCONTINUED | OUTPATIENT
Start: 2022-06-27 | End: 2022-06-27

## 2022-06-27 RX ORDER — DOCUSATE SODIUM 100 MG/1
100 CAPSULE, LIQUID FILLED ORAL 2 TIMES DAILY
Status: DISCONTINUED | OUTPATIENT
Start: 2022-06-27 | End: 2022-06-29 | Stop reason: HOSPADM

## 2022-06-27 RX ORDER — ALBUTEROL SULFATE 0.83 MG/ML
2.5 SOLUTION RESPIRATORY (INHALATION)
Status: DISCONTINUED | OUTPATIENT
Start: 2022-06-27 | End: 2022-06-27 | Stop reason: HOSPADM

## 2022-06-27 RX ORDER — FENTANYL CITRATE 50 UG/ML
INJECTION, SOLUTION INTRAMUSCULAR; INTRAVENOUS AS NEEDED
Status: DISCONTINUED | OUTPATIENT
Start: 2022-06-27 | End: 2022-06-27 | Stop reason: HOSPADM

## 2022-06-27 RX ORDER — SODIUM CHLORIDE 0.9 % (FLUSH) 0.9 %
5-40 SYRINGE (ML) INJECTION EVERY 8 HOURS
Status: DISCONTINUED | OUTPATIENT
Start: 2022-06-27 | End: 2022-06-27 | Stop reason: HOSPADM

## 2022-06-27 RX ORDER — OXYCODONE HYDROCHLORIDE 5 MG/1
5 TABLET ORAL
Qty: 60 TABLET | Refills: 0 | Status: SHIPPED | OUTPATIENT
Start: 2022-06-27 | End: 2022-07-04

## 2022-06-27 RX ORDER — SODIUM CHLORIDE, SODIUM LACTATE, POTASSIUM CHLORIDE, CALCIUM CHLORIDE 600; 310; 30; 20 MG/100ML; MG/100ML; MG/100ML; MG/100ML
50 INJECTION, SOLUTION INTRAVENOUS CONTINUOUS
Status: DISCONTINUED | OUTPATIENT
Start: 2022-06-27 | End: 2022-06-27 | Stop reason: HOSPADM

## 2022-06-27 RX ORDER — NALOXONE HYDROCHLORIDE 0.4 MG/ML
0.4 INJECTION, SOLUTION INTRAMUSCULAR; INTRAVENOUS; SUBCUTANEOUS AS NEEDED
Status: DISCONTINUED | OUTPATIENT
Start: 2022-06-27 | End: 2022-06-29 | Stop reason: HOSPADM

## 2022-06-27 RX ORDER — LIDOCAINE HYDROCHLORIDE 20 MG/ML
INJECTION, SOLUTION EPIDURAL; INFILTRATION; INTRACAUDAL; PERINEURAL AS NEEDED
Status: DISCONTINUED | OUTPATIENT
Start: 2022-06-27 | End: 2022-06-27 | Stop reason: HOSPADM

## 2022-06-27 RX ORDER — SODIUM CHLORIDE 9 MG/ML
75 INJECTION, SOLUTION INTRAVENOUS CONTINUOUS
Status: DISPENSED | OUTPATIENT
Start: 2022-06-27 | End: 2022-06-28

## 2022-06-27 RX ORDER — KETAMINE HCL IN 0.9 % NACL 50 MG/5 ML
SYRINGE (ML) INTRAVENOUS AS NEEDED
Status: DISCONTINUED | OUTPATIENT
Start: 2022-06-27 | End: 2022-06-27 | Stop reason: HOSPADM

## 2022-06-27 RX ORDER — MELOXICAM 7.5 MG/1
7.5 TABLET ORAL 2 TIMES DAILY
Qty: 28 TABLET | Refills: 0 | Status: SHIPPED | OUTPATIENT
Start: 2022-06-27 | End: 2022-07-11

## 2022-06-27 RX ORDER — CEFAZOLIN SODIUM/WATER 2 G/20 ML
2 SYRINGE (ML) INTRAVENOUS EVERY 8 HOURS
Status: COMPLETED | OUTPATIENT
Start: 2022-06-27 | End: 2022-06-28

## 2022-06-27 RX ORDER — KETOROLAC TROMETHAMINE 15 MG/ML
15 INJECTION, SOLUTION INTRAMUSCULAR; INTRAVENOUS EVERY 6 HOURS
Status: COMPLETED | OUTPATIENT
Start: 2022-06-27 | End: 2022-06-28

## 2022-06-27 RX ORDER — CEFADROXIL 500 MG/1
500 CAPSULE ORAL 2 TIMES DAILY
Qty: 10 CAPSULE | Refills: 0 | Status: SHIPPED | OUTPATIENT
Start: 2022-06-27 | End: 2022-07-02

## 2022-06-27 RX ORDER — DEXAMETHASONE SODIUM PHOSPHATE 4 MG/ML
INJECTION, SOLUTION INTRA-ARTICULAR; INTRALESIONAL; INTRAMUSCULAR; INTRAVENOUS; SOFT TISSUE AS NEEDED
Status: DISCONTINUED | OUTPATIENT
Start: 2022-06-27 | End: 2022-06-27 | Stop reason: HOSPADM

## 2022-06-27 RX ORDER — FENTANYL CITRATE 50 UG/ML
50 INJECTION, SOLUTION INTRAMUSCULAR; INTRAVENOUS
Status: DISCONTINUED | OUTPATIENT
Start: 2022-06-27 | End: 2022-06-27 | Stop reason: HOSPADM

## 2022-06-27 RX ORDER — SODIUM CHLORIDE 0.9 % (FLUSH) 0.9 %
5-40 SYRINGE (ML) INJECTION AS NEEDED
Status: DISCONTINUED | OUTPATIENT
Start: 2022-06-27 | End: 2022-06-27 | Stop reason: HOSPADM

## 2022-06-27 RX ORDER — ACETAMINOPHEN 500 MG
1000 TABLET ORAL ONCE
Status: COMPLETED | OUTPATIENT
Start: 2022-06-27 | End: 2022-06-27

## 2022-06-27 RX ORDER — SUCRALFATE 1 G/1
1 TABLET ORAL DAILY
Status: DISCONTINUED | OUTPATIENT
Start: 2022-06-28 | End: 2022-06-29 | Stop reason: HOSPADM

## 2022-06-27 RX ORDER — ONDANSETRON 2 MG/ML
INJECTION INTRAMUSCULAR; INTRAVENOUS AS NEEDED
Status: DISCONTINUED | OUTPATIENT
Start: 2022-06-27 | End: 2022-06-27 | Stop reason: HOSPADM

## 2022-06-27 RX ORDER — OXYCODONE HYDROCHLORIDE 5 MG/1
5 TABLET ORAL
Status: DISCONTINUED | OUTPATIENT
Start: 2022-06-27 | End: 2022-06-29 | Stop reason: HOSPADM

## 2022-06-27 RX ORDER — ALBUTEROL SULFATE 90 UG/1
2 AEROSOL, METERED RESPIRATORY (INHALATION)
Status: DISCONTINUED | OUTPATIENT
Start: 2022-06-27 | End: 2022-06-27 | Stop reason: CLARIF

## 2022-06-27 RX ORDER — ASPIRIN 81 MG/1
81 TABLET ORAL 2 TIMES DAILY
Status: DISCONTINUED | OUTPATIENT
Start: 2022-06-27 | End: 2022-06-29 | Stop reason: HOSPADM

## 2022-06-27 RX ORDER — TRANEXAMIC ACID 10 MG/ML
1 INJECTION, SOLUTION INTRAVENOUS ONCE
Status: COMPLETED | OUTPATIENT
Start: 2022-06-27 | End: 2022-06-27

## 2022-06-27 RX ORDER — SODIUM CHLORIDE 0.9 % (FLUSH) 0.9 %
5-40 SYRINGE (ML) INJECTION EVERY 8 HOURS
Status: DISCONTINUED | OUTPATIENT
Start: 2022-06-27 | End: 2022-06-29 | Stop reason: HOSPADM

## 2022-06-27 RX ORDER — DEXAMETHASONE SODIUM PHOSPHATE 4 MG/ML
4 INJECTION, SOLUTION INTRA-ARTICULAR; INTRALESIONAL; INTRAMUSCULAR; INTRAVENOUS; SOFT TISSUE ONCE
Status: COMPLETED | OUTPATIENT
Start: 2022-06-27 | End: 2022-06-27

## 2022-06-27 RX ORDER — SODIUM CHLORIDE, SODIUM LACTATE, POTASSIUM CHLORIDE, CALCIUM CHLORIDE 600; 310; 30; 20 MG/100ML; MG/100ML; MG/100ML; MG/100ML
125 INJECTION, SOLUTION INTRAVENOUS CONTINUOUS
Status: DISCONTINUED | OUTPATIENT
Start: 2022-06-27 | End: 2022-06-29 | Stop reason: HOSPADM

## 2022-06-27 RX ORDER — ALBUTEROL SULFATE 0.83 MG/ML
2.5 SOLUTION RESPIRATORY (INHALATION)
Status: DISCONTINUED | OUTPATIENT
Start: 2022-06-27 | End: 2022-06-29 | Stop reason: HOSPADM

## 2022-06-27 RX ORDER — SODIUM CHLORIDE 0.9 % (FLUSH) 0.9 %
5-40 SYRINGE (ML) INJECTION AS NEEDED
Status: DISCONTINUED | OUTPATIENT
Start: 2022-06-27 | End: 2022-06-29 | Stop reason: HOSPADM

## 2022-06-27 RX ORDER — METOCLOPRAMIDE HYDROCHLORIDE 5 MG/ML
10 INJECTION INTRAMUSCULAR; INTRAVENOUS
Status: DISCONTINUED | OUTPATIENT
Start: 2022-06-27 | End: 2022-06-29 | Stop reason: HOSPADM

## 2022-06-27 RX ORDER — ACETAMINOPHEN 325 MG/1
650 TABLET ORAL EVERY 6 HOURS
Status: DISCONTINUED | OUTPATIENT
Start: 2022-06-27 | End: 2022-06-29 | Stop reason: HOSPADM

## 2022-06-27 RX ORDER — ONDANSETRON 2 MG/ML
4 INJECTION INTRAMUSCULAR; INTRAVENOUS ONCE
Status: COMPLETED | OUTPATIENT
Start: 2022-06-27 | End: 2022-06-27

## 2022-06-27 RX ORDER — NALOXONE HYDROCHLORIDE 0.4 MG/ML
0.04 INJECTION, SOLUTION INTRAMUSCULAR; INTRAVENOUS; SUBCUTANEOUS
Status: DISCONTINUED | OUTPATIENT
Start: 2022-06-27 | End: 2022-06-27 | Stop reason: HOSPADM

## 2022-06-27 RX ORDER — PANTOPRAZOLE SODIUM 40 MG/1
40 TABLET, DELAYED RELEASE ORAL ONCE
Status: COMPLETED | OUTPATIENT
Start: 2022-06-27 | End: 2022-06-27

## 2022-06-27 RX ORDER — PROPOFOL 10 MG/ML
INJECTION, EMULSION INTRAVENOUS AS NEEDED
Status: DISCONTINUED | OUTPATIENT
Start: 2022-06-27 | End: 2022-06-27 | Stop reason: HOSPADM

## 2022-06-27 RX ORDER — SODIUM CHLORIDE 9 MG/ML
300 INJECTION, SOLUTION INTRAVENOUS CONTINUOUS
Status: DISCONTINUED | OUTPATIENT
Start: 2022-06-27 | End: 2022-06-27

## 2022-06-27 RX ORDER — CEFAZOLIN SODIUM/WATER 2 G/20 ML
2 SYRINGE (ML) INTRAVENOUS ONCE
Status: COMPLETED | OUTPATIENT
Start: 2022-06-27 | End: 2022-06-27

## 2022-06-27 RX ORDER — DIPHENHYDRAMINE HYDROCHLORIDE 50 MG/ML
12.5 INJECTION, SOLUTION INTRAMUSCULAR; INTRAVENOUS
Status: DISCONTINUED | OUTPATIENT
Start: 2022-06-27 | End: 2022-06-27 | Stop reason: HOSPADM

## 2022-06-27 RX ORDER — LANOLIN ALCOHOL/MO/W.PET/CERES
1 CREAM (GRAM) TOPICAL 3 TIMES DAILY
Status: DISCONTINUED | OUTPATIENT
Start: 2022-06-27 | End: 2022-06-29 | Stop reason: HOSPADM

## 2022-06-27 RX ORDER — NALBUPHINE HYDROCHLORIDE 10 MG/ML
5 INJECTION, SOLUTION INTRAMUSCULAR; INTRAVENOUS; SUBCUTANEOUS
Status: DISCONTINUED | OUTPATIENT
Start: 2022-06-27 | End: 2022-06-27 | Stop reason: HOSPADM

## 2022-06-27 RX ORDER — OXYCODONE HYDROCHLORIDE 5 MG/1
10 TABLET ORAL
Status: DISCONTINUED | OUTPATIENT
Start: 2022-06-27 | End: 2022-06-29 | Stop reason: HOSPADM

## 2022-06-27 RX ORDER — GUAIFENESIN 100 MG/5ML
81 LIQUID (ML) ORAL 2 TIMES DAILY
Qty: 42 TABLET | Refills: 0 | Status: SHIPPED | OUTPATIENT
Start: 2022-06-27 | End: 2022-07-18

## 2022-06-27 RX ORDER — HYDROMORPHONE HYDROCHLORIDE 1 MG/ML
0.5 INJECTION, SOLUTION INTRAMUSCULAR; INTRAVENOUS; SUBCUTANEOUS AS NEEDED
Status: DISCONTINUED | OUTPATIENT
Start: 2022-06-27 | End: 2022-06-27 | Stop reason: HOSPADM

## 2022-06-27 RX ORDER — ONDANSETRON 2 MG/ML
4 INJECTION INTRAMUSCULAR; INTRAVENOUS
Status: DISCONTINUED | OUTPATIENT
Start: 2022-06-27 | End: 2022-06-29 | Stop reason: HOSPADM

## 2022-06-27 RX ORDER — DIPHENHYDRAMINE HYDROCHLORIDE 50 MG/ML
12.5 INJECTION, SOLUTION INTRAMUSCULAR; INTRAVENOUS
Status: DISCONTINUED | OUTPATIENT
Start: 2022-06-27 | End: 2022-06-29 | Stop reason: HOSPADM

## 2022-06-27 RX ADMIN — ASPIRIN 81 MG: 81 TABLET, COATED ORAL at 21:44

## 2022-06-27 RX ADMIN — ACETAMINOPHEN 1000 MG: 500 TABLET ORAL at 10:00

## 2022-06-27 RX ADMIN — Medication 2 G: at 10:58

## 2022-06-27 RX ADMIN — Medication 25 MG: at 11:05

## 2022-06-27 RX ADMIN — FERROUS SULFATE TAB 325 MG (65 MG ELEMENTAL FE) 325 MG: 325 (65 FE) TAB at 18:56

## 2022-06-27 RX ADMIN — HYDROMORPHONE HYDROCHLORIDE 0.5 MG: 1 INJECTION, SOLUTION INTRAMUSCULAR; INTRAVENOUS; SUBCUTANEOUS at 12:48

## 2022-06-27 RX ADMIN — NALXONE HYDROCHLORIDE 0.04 MG: 0.4 INJECTION INTRAMUSCULAR; INTRAVENOUS; SUBCUTANEOUS at 14:35

## 2022-06-27 RX ADMIN — SODIUM CHLORIDE, SODIUM LACTATE, POTASSIUM CHLORIDE, AND CALCIUM CHLORIDE 1000 ML: 600; 310; 30; 20 INJECTION, SOLUTION INTRAVENOUS at 09:53

## 2022-06-27 RX ADMIN — ONDANSETRON HYDROCHLORIDE 4 MG: 2 INJECTION INTRAMUSCULAR; INTRAVENOUS at 11:01

## 2022-06-27 RX ADMIN — KETOROLAC TROMETHAMINE 15 MG: 15 INJECTION, SOLUTION INTRAMUSCULAR; INTRAVENOUS at 23:46

## 2022-06-27 RX ADMIN — FENTANYL CITRATE 25 MCG: 50 INJECTION, SOLUTION INTRAMUSCULAR; INTRAVENOUS at 11:18

## 2022-06-27 RX ADMIN — SODIUM CHLORIDE, SODIUM LACTATE, POTASSIUM CHLORIDE, AND CALCIUM CHLORIDE 125 ML/HR: 600; 310; 30; 20 INJECTION, SOLUTION INTRAVENOUS at 12:36

## 2022-06-27 RX ADMIN — PANTOPRAZOLE SODIUM 40 MG: 40 TABLET, DELAYED RELEASE ORAL at 10:00

## 2022-06-27 RX ADMIN — SODIUM CHLORIDE, SODIUM LACTATE, POTASSIUM CHLORIDE, AND CALCIUM CHLORIDE 125 ML/HR: 600; 310; 30; 20 INJECTION, SOLUTION INTRAVENOUS at 09:53

## 2022-06-27 RX ADMIN — KETOROLAC TROMETHAMINE 15 MG: 15 INJECTION, SOLUTION INTRAMUSCULAR; INTRAVENOUS at 18:57

## 2022-06-27 RX ADMIN — TRANEXAMIC ACID 1 G: 10 INJECTION, SOLUTION INTRAVENOUS at 11:00

## 2022-06-27 RX ADMIN — TRANEXAMIC ACID 1 G: 10 INJECTION, SOLUTION INTRAVENOUS at 12:05

## 2022-06-27 RX ADMIN — DEXAMETHASONE SODIUM PHOSPHATE 4 MG: 4 INJECTION, SOLUTION INTRAMUSCULAR; INTRAVENOUS at 10:01

## 2022-06-27 RX ADMIN — ONDANSETRON 4 MG: 2 INJECTION INTRAMUSCULAR; INTRAVENOUS at 14:53

## 2022-06-27 RX ADMIN — LIDOCAINE HYDROCHLORIDE 40 MG: 20 INJECTION, SOLUTION INTRAVENOUS at 10:49

## 2022-06-27 RX ADMIN — CEFAZOLIN 2 G: 10 INJECTION, POWDER, FOR SOLUTION INTRAVENOUS at 18:56

## 2022-06-27 RX ADMIN — ACETAMINOPHEN 650 MG: 325 TABLET ORAL at 18:56

## 2022-06-27 RX ADMIN — PROPOFOL 80 MG: 10 INJECTION, EMULSION INTRAVENOUS at 10:49

## 2022-06-27 RX ADMIN — SODIUM CHLORIDE 75 ML/HR: 9 INJECTION, SOLUTION INTRAVENOUS at 19:20

## 2022-06-27 RX ADMIN — FENTANYL CITRATE 25 MCG: 50 INJECTION, SOLUTION INTRAMUSCULAR; INTRAVENOUS at 11:11

## 2022-06-27 RX ADMIN — PROPOFOL 40 MG: 10 INJECTION, EMULSION INTRAVENOUS at 10:51

## 2022-06-27 RX ADMIN — DEXAMETHASONE SODIUM PHOSPHATE 4 MG: 4 INJECTION, SOLUTION INTRAMUSCULAR; INTRAVENOUS at 11:01

## 2022-06-27 NOTE — PERIOP NOTES
TRANSFER - OUT REPORT:    Verbal report given to [address] on Classeliel Ego  being transferred to [address] [place] [address] for routine progression of care       Report consisted of patients Situation, Background, Assessment and   Recommendations(SBAR). Information from the following report(s) SBAR, Kardex, Procedure Summary and MAR was reviewed with the receiving nurse. Lines:   Peripheral IV 13/35/21 Right Cephalic (Active)   Site Assessment Clean, dry, & intact 06/27/22 1600   Phlebitis Assessment 0 06/27/22 1600   Infiltration Assessment 0 06/27/22 1600   Dressing Status Clean, dry, & intact 06/27/22 1600   Dressing Type Tape;Transparent 06/27/22 1600   Hub Color/Line Status Patent;Green; Infusing 06/27/22 1600   Alcohol Cap Used No 06/27/22 0956        Opportunity for questions and clarification was provided.       Patient transported with:   O2 @ 3 liters  Registered Nurse

## 2022-06-27 NOTE — PROGRESS NOTES
RN noted crackles to patient's bilateral bases and edema to BLE L>R. JALEEL Monique notified and scheduled post operative normal saline decreased from 125mls/hr to 75mls/hr. Patient diet changed to full liquids per patient's son's [facility] request. Patient's son states \"she drinks better than she eats. \"

## 2022-06-27 NOTE — INTERVAL H&P NOTE
Update History & Physical    The Patient's History and Physical of June 23, 2022 was reviewed with the patient and I examined the patient. There was no change. The surgical site was confirmed by the patient and me. Plan:  The risk, benefits, expected outcome, and alternative to the recommended procedure have been discussed with the patient. Patient understands and wants to proceed with the procedure.     Electronically signed by Savannah Alvarado MD on 6/27/2022 at 10:25 AM

## 2022-06-27 NOTE — ANESTHESIA POSTPROCEDURE EVALUATION
Post-Anesthesia Evaluation and Assessment    Cardiovascular Function/Vital Signs  Visit Vitals  BP (!) 101/56   Pulse 81   Temp 36.5 °C (97.7 °F)   Resp 8   Wt 47.6 kg (105 lb)   SpO2 99%   BMI 18.02 kg/m²       Patient is status post Procedure(s):  RIGHT TOTAL HIP REPLACEMENT, ANTERIOR APPROACH W/C-ARM. Nausea/Vomiting: Controlled. Postoperative hydration reviewed and adequate. Pain:  Pain Scale 1: Visual (06/27/22 1343)  Pain Intensity 1: 0 (06/27/22 1343)   Managed. Neurological Status:   Neuro (WDL): Exceptions to WDL (06/27/22 1318)   At baseline. Mental Status and Level of Consciousness: Baseline and appropriate for discharge. Pulmonary Status:   O2 Device: Nasal cannula (06/27/22 1343)   Adequate oxygenation and airway patent. Complications related to anesthesia: None    Post-anesthesia assessment completed. No concerns. Patient has met all discharge requirements.     Signed By: Berenice Davis MD    June 27, 2022

## 2022-06-27 NOTE — PROGRESS NOTES
Problem: Mobility Impaired (Adult and Pediatric)  Goal: *Acute Goals and Plan of Care (Insert Text)  Description: In 1-7 days pt will be able to perform:  ST.  Bed mobility:  Rolling L to R to L CGA for positioning. 2.  Supine to sit to supine CGA with HR for meals. 3.  Sit to stand to sit SBA with RW in prep for ambulation. LT.  Gait:  Ambulate >50ft CGA with RW, WBAT, for home/ mobility. 2.  Activity tolerance: Tolerate up in chair 1-2 hours for ADL's.  3.  Patient/Family Education:  Patient/family to be independent with HEP for follow-up care and safe discharge. Note: []  Patient has met MD carolyn patrick for d/c home   []  Recommend HH with 24 hour adult care   [x]  Benefit from additional acute PT session to address:  transfer training, gait training    PHYSICAL THERAPY EVALUATION    Patient: Kina Ware (35 y.o. female)  Date: 2022  Primary Diagnosis: Primary localized osteoarthritis of right hip [M16.11]  Procedure(s) (LRB):  RIGHT TOTAL HIP REPLACEMENT, ANTERIOR APPROACH W/C-ARM (Right) Day of Surgery   Precautions:   Fall,WBAT    PLOF: Home care staff A pt w/ bathing, ambulation w/ RW in home; lives w/ son; sleeps on couch    ASSESSMENT :  Based on the objective data described below, the patient presents with decreased mobility in regards to bed mobility, transfers, gt quality and tolerance, balance, stair negotiation and safety due to R ANITA surgery. Decreased AROM of R hip, dec strength of R hip, pain in R hip, dec sensation of R hip, frail presentation w/ respiratory MCCLENDON also impacting pt functional mobility. Pt rating pain on numerical pain scale pre/post and during session 2/10. Pt and son ed regarding mobility safety, WB, HEP, ice application/use, environmental safety and need to use call bell for activity. Son present is hypervigilant and gives instruction overtop of therapist.  Pt requires additional time for motor processing and answering simple questions. Pt able to perform supine<>sit w/ min/mod Ax1-2 and sit<>stand w/ mod Ax2 bed elevated for success. Safety vc required throughout session to reinforce safety. Pt able to stand at bedside w/ mod/min Ax2 knees remaining flexed despite vc and tc. Pt unable to initiate steps and kept knees together during upright trial.  Answered questions by pt and son in regards to PT and mobility. Pt left supine in bed w/ all needs within reach, B SCD applied and ice pack to R hip. Recommend use of bedpan for toileting for now due to safety concerns. MCCLENDON noted throughout session. Son requests d/c to home however pt not safe at this time. Pt may benefit from SNF rehab if progress is slow. Nurse Kerry aware of session and outcomes. BP prior to session supine at rest 108/64, sitting /88 and at end of session supine in bed 97/59. Patient will benefit from skilled intervention to address the above impairments. Patient's rehabilitation potential is considered to be Fair  Factors which may influence rehabilitation potential include:   []         None noted  []         Mental ability/status  [x]         Medical condition  [x]         Home/family situation and support systems  [x]         Safety awareness  [x]         Pain tolerance/management  []         Other:      PLAN :  Recommendations and Planned Interventions:   [x]           Bed Mobility Training             []    Neuromuscular Re-Education  [x]           Transfer Training                   []    Orthotic/Prosthetic Training  [x]           Gait Training                          [x]    Modalities  [x]           Therapeutic Exercises           [x]    Edema Management/Control  [x]           Therapeutic Activities            [x]    Family Training/Education  [x]           Patient Education  []           Other (comment):    Frequency/Duration: Patient will be followed by physical therapy 1-2 times per day/4-7 days per week to address goals.   Discharge Recommendations: To Be Determined  Further Equipment Recommendations for Discharge: N/A     SUBJECTIVE:   Patient stated I don't feel like it.     OBJECTIVE DATA SUMMARY:     Past Medical History:   Diagnosis Date    Asthma     Cancer ([address]     skin cancer-melanomas    GERD (gastroesophageal reflux disease)     Hypertension     OA (osteoarthritis) 2012    Right hip, knee    Primary localized osteoarthritis of right hip 6/23/2022    Scoliosis     of spine     Past Surgical History:   Procedure Laterality Date    HX GI  2020    upper endoscopy to repair bleeding ulcer    HX GI  2007    Colonoscopy-Dr. Francesca Arndt    HX HEENT Bilateral 2002    Lasic surgery    HX HEENT  2021    Molar extraction by oral surgeon    HX ORTHOPAEDIC Right 2019    leg surg with hardware after fall-Dr. Kim Mansfield OTHER SURGICAL      multiple skin cancer removal     Barriers to Learning/Limitations: yes;  physical, anesthesia  Compensate with: Visual Cues, Verbal Cues, and Tactile Cues  Home Situation:  Home Situation  Home Environment: Private residence  Wheelchair Ramp: Yes  One/Two Story Residence: Two story, live on 1st floor  Living Alone: No  Support Systems: Child(grey)  Patient Expects to be Discharged to[de-identified] Home with home health  Current DME Used/Available at Home: [employer], rolling,Wheelchair,Safety frame [employer]  Critical Behavior:  Neurologic State: Drowsy  Orientation Level: Oriented to person  Cognition: Decreased command following;Decreased attention/concentration  Safety/Judgement: Decreased awareness of environment;Decreased awareness of need for safety;Decreased insight into deficits  Psychosocial  Patient Behaviors: Calm; Cooperative;Flat affect  Family  Behaviors: Hyper-vigilant  Skin Condition/Temp: Warm  Family  Behaviors: Hyper-vigilant  Skin Integrity: Incision (comment) (R hip)  Skin Integumentary  Skin Color: Pale  Skin Condition/Temp: Warm  Skin Integrity: Incision (comment) (R hip)  Strength:    Strength: Generally decreased, functional  Tone & Sensation:   Tone: Normal  Sensation: Impaired (R hip)  Range Of Motion:  AROM: Generally decreased, functional  Functional Mobility:  Bed Mobility:  Supine to Sit: Minimum assistance; Additional time (vc)  Sit to Supine: Moderate assistance;Assist x2; Additional time (vc)  Scooting: Additional time;Minimum assistance (vc)  Transfers:  Sit to Stand: Moderate assistance;Assist x2; Additional time (bed elevated)  Stand to Sit: Minimum assistance;Assist x2 (bed elevated)  Balance:   Sitting: Intact  Standing: Impaired; With support  Standing - Static: Poor;Fair;Constant support  Standing - Dynamic : Not tested  Ambulation/Gait Training:  Right Side Weight Bearing: As tolerated  Therapeutic Exercises:   Encouraged HEP  Pain:  Pain level pre-treatment: 2/10   Pain level post-treatment: 2/10   Pain Intervention(s) : Medication (see MAR); Rest, Ice, Repositioning  Response to intervention: Nurse notified, See doc flow    Activity Tolerance:   Fair -  Please refer to the flowsheet for vital signs taken during this treatment. After treatment:   []         Patient left in no apparent distress sitting up in chair  [x]         Patient left in no apparent distress in bed  [x]         Call bell left within reach  [x]         Nursing notified  [x]         Caregiver present  []         Bed alarm activated  []         SCDs applied    COMMUNICATION/EDUCATION:   [x]         Role of Physical Therapy in the acute care setting. [x]         Fall prevention education was provided and the patient/caregiver indicated understanding. [x]         Patient/family have participated as able in goal setting and plan of care. [x]         Patient/family agree to work toward stated goals and plan of care. []         Patient understands intent and goals of therapy, but is neutral about his/her participation. []         Patient is unable to participate in goal setting/plan of care: ongoing with therapy staff.   [] Other:    Thank you for this referral.  Kiya Buckner, PT   Time Calculation: 36 mins      Eval Complexity: History: HIGH Complexity :3+ comorbidities / personal factors will impact the outcome/ POC Exam:MEDIUM Complexity : 3 Standardized tests and measures addressing body structure, function, activity limitation and / or participation in recreation  Presentation: MEDIUM Complexity : Evolving with changing characteristics  Clinical Decision Making:High Complexity    Overall Complexity:MEDIUM

## 2022-06-27 NOTE — PROGRESS NOTES
1932 - Assumed care at this time. 2035 - Patient A&Ox3, 3L NC. Denies chest pain, c/o MCCLENDON. Lung sounds coarse. Silver mepilex dressing to right hip C/D/I. Denies numbness/tingling/calf pain. Pain 0/10. compression device bilaterally. Pt educated on IS use, q2h rounds, pain management. Pt verbalized understanding, no concerns voiced. Call bell within reach, bed in lowest position. Pt encouraged to call for assistance.

## 2022-06-27 NOTE — PROGRESS NOTES
Transition of Care (SHANE) Plan: Stephen Levi Perry with physician follow up     Chart reviewed. Pt admitted for an elective surgical procedure  (RIGHT TOTAL HIP REPLACEMENT, ANTERIOR APPROACH W/C-ARM)  . Pt has been prearranged with [facility] Perry to assist with care transition. CM contacted this agency and they have confirmed orders and spoke with pt's son. Anticipate pt will transition home with the above services. No other transition of care needs identified at this time. Anticipate pt will be medically stable for discharge within the next 24-48 hours with [facility] and  physician follow up. CM available to assist as needed. SHANE Transportation:   How is patient being transported at discharge? Family/Friend      When? Once cleared by physician     Is transport scheduled? N/A      Follow-up appointment and transportation:   PCP/Specialist?  See AVS for Appointment         Who is transporting to the follow-up appointment? Self/Family/Friend      Is transport for follow up appointment scheduled? N/A    Communication plan (with patient/family): Who is being called? Patient or Next of Kin? Responsible party? Patient      What number(s) is to be used? See Facesheet      What service provider is calling for [facility] services? When are they calling? Readmission Risk? (Green/Low; Yellow/Moderate; Red/High):  [employer] here to complete [address] including selection of the Healthcare Decision Maker Relationship (ie \"Primary\")      Care Management Interventions  Mode of Transport at Discharge: Other (see comment)  Transition of Care Consult (CM Consult): [address]: Yes  Support Systems: Child(grey)  The Plan for Transition of Care is Related to the Following Treatment Goals :  Murphy Perry with physician follow up  The Patient and/or Patient Representative was Provided with a Choice of Provider and Agrees with the Discharge Plan?: Yes  Name of the Patient Representative Who was Provided with a Choice of Provider and Agrees with the Discharge Plan: family  Freedom of Choice List was Provided with Basic Dialogue that Supports the Patient's Individualized Plan of Care/Goals, Treatment Preferences and Shares the Quality Data Associated with the Providers?: Yes  Discharge Location  Patient Expects to be Discharged to[de-identified] Home with home health

## 2022-06-27 NOTE — ANESTHESIA PREPROCEDURE EVALUATION
Relevant Problems   No relevant active problems       Anesthetic History   No history of anesthetic complications     Pertinent negatives: No PONV       Review of Systems / Medical History  Patient summary reviewed, nursing notes reviewed and pertinent labs reviewed    Pulmonary            Asthma   Pertinent negatives: No COPD and sleep apnea     Neuro/Psych   Within defined limits        Pertinent negatives: No seizures and CVA   Cardiovascular    Hypertension            Pertinent negatives: No past MI and CHF       GI/Hepatic/Renal     GERD        Pertinent negatives: No liver disease and renal disease   Endo/Other        Arthritis  Pertinent negatives: No diabetes   Other Findings              Physical Exam    Airway  Mallampati: II  TM Distance: 4 - 6 cm  Neck ROM: normal range of motion   Mouth opening: Normal     Cardiovascular               Dental    Dentition: Poor dentition     Pulmonary                 Abdominal  GI exam deferred       Other Findings            Anesthetic Plan    ASA: 3  Anesthesia type: spinal            Anesthetic plan and risks discussed with: Patient

## 2022-06-27 NOTE — PERIOP NOTES
Reviewed PTA medication list with patient/caregiver and patient/caregiver denies any additional medications. Patient admits to having a responsible adult care for them at home for at least 24 hours after surgery. Patient encouraged to use gown warming system and informed that using said warming gown to regulate body temperature prior to a procedure has been shown to help reduce the risks of blood clots and infection. Patient's pharmacy of choice verified and documented in PTA medication section. Dual skin assessment & fall risk band verification completed with Marylou Slater RN.

## 2022-06-27 NOTE — OP NOTES
[address] Medicine  Total Hip Arthroplasty      Patient: Jorge Bean MRN: [id number]  SSN: [id number]    YOB: 1930  Age: 80 y.o. Sex: female      Date of Surgery: 6/27/2022   Preoperative Diagnosis: RIGHT HIP OSTEOARTHRITIS   Postoperative Diagnosis: RIGHT HIP OSTEOARTHRITIS   Location: [facility]  Surgeon: Mimi Shea MD  Assistant: Kym Sawant PA-C    Anesthesia: spinal    Procedure: Total Right Hip Arthroplasty    Findings: Degenerative joint disease of the right hip. Estimated Blood Loss: 250ml    Specimens: None    Complications: none    Implants:   Implant Name Type Inv.  Item Serial No.  Lot No. LRB No. Used Action   LINER ACET 42X54 MM HIP 2 MOBILITY XLPE OR3O - [id number]  LINER ACET 42X54 MM HIP 2 MOBILITY XLPE OR3O  BELL AND NEPHEW ORTHOPAEDICS_"[employer]" [id number] Right 1 Implanted   SHELL ACET MOD REDAPT 54MM - [id number]  SHELL ACET MOD REDAPT 54MM  BELL AND NEPHEW_ [id number] Right 1 Implanted   SCREW BNE L25MM TI ALLY ACET HIP ASYA ANG RENO REV REDAPT - [id number]  SCREW BNE L25MM TI ALLY ACET HIP ASYA ANG RENO REV REDAPT  BELL AND NEPHEW ORTHOPAEDICS_ [id number] Right 1 Implanted   CEMENT BNE 40GM FULL DOSE PMMA W/ GENT M VISC RADPQ FAST - [id number]  CEMENT BNE 40GM FULL DOSE PMMA W/ GENT M VISC RADPQ FAST  [id number] ORTHOPEDICS_ [id number] Right 2 Implanted   HEAD FEM OD28MM +0MM OFFSET DELT CERAMIC 12 14 TAPR - [id number]  HEAD FEM OD28MM +0MM OFFSET DELT CERAMIC 12 14 TAPR  [employer]_WD [id number] Right 1 Implanted   STEM FEM SZ 11 STD ALPINE BEE - [id number]  STEM FEM SZ 11 STD ALPINE BEE  ORTH[employer]_WD [id number] Right 1 Implanted   CENTRALIZER STEM OD11MM ALPINE - [id number]  CENTRALIZER STEM OD11MM ALPINE  ORTH[employer]_WD [id number] Right 1 Implanted   INSERT TIB 28X42 MM 2 MOBILITY XLPE OR3O - [id number]  INSERT TIB 28X42 MM 2 MOBILITY XLPE OR3O  BELL AND NEPHEW ORTHOPAEDICS_"[employer]" [id number] Right 1 Implanted       Procedure Detail:  After the patient was brought to the operating suite, She was effectively anesthetized using general anesthesia, then transferred to the [place] table and secured in a standard fashion. Her right hip was then prepped and draped in a normal sterile orthopedic fashion. She was given appropriate intravenous antibiotics preoperatively. After a proper timeout was performed, a direct anterior approach to the hip was performed using a short Espinoza-Davalos interval. Anterior capsulotomy was performed. The degenerative changes of the hip were noted. Femoral neck osteotomy was then performed to the templated area. The head and neck were removed. The pulvinar and labrum were excised. The acetabulum was then reamed up to 54 mm with good bleeding cancellous bone obtained. The cup was then irrigated with pulse lavage system. A 54 mm S&N R3 cup was then impacted in place with excellent stable fixation obtained, placing the cup at about 45 degrees of abduction, 20 degrees of anteversion. The liner for the dual mobility construct was then impacted in place. A screw was placed. Attention was turned to the femur, which was delivered into the wound with a combination of extension, external rotation, and adduction, and using the hook on the [place] table to deliver the femur into the wound. The canal was broached up to a size 10 for the cemented Alpine stem system with excellent stable fixation obtained. A trial reduction was then performed with the standard neck offset and 28 mm head balls with various neck lengths wit the appropriate dual mobility liner. With the +0, she appeared to have equalization of leg lengths and restoration of offset radiographically, and excellent functional stability was noted. The trial broach was removed. The canal was irrigated with the pulse lavage system. The final components were impacted in place with excellent stable fixation obtained once again.  The final reduction was performed and once again leg lengths and offset were restored radiographically, using the C-arm radiographically intraoperatively, and excellent functional stability was noted. The wound was then irrigated one more time, and then closed in layers. The fascia of the tensor was closed with #1 Vicryl in a running type stitch. Subcutaneous tissue was closed with 2-0 Vicryl in a simple buried stitch, and the skin was closed with Prineo. Dry, sterile dressing was then applied. She tolerated this well, was transferred to the bed, and taken to recovery room, extubated, in stable condition. All sponge and needle counts were correct. Signed By: Jaxon Day MD     June 27, 2022           Operative Note    Patient: Rakan Box  YOB: 1930  MRN: [id number]    Date of Procedure: 6/27/2022     Pre-Op Diagnosis: RIGHT HIP OSTEOARTHRITIS    Post-Op Diagnosis: Same as preoperative diagnosis. Procedure(s):  RIGHT TOTAL HIP REPLACEMENT, ANTERIOR APPROACH W/C-ARM    Surgeon(s):  Garima Qureshi MD    Surgical Assistant: Physician Assistant: JALEEL Read  Surg Asst-1: Janeth Martell Later    Anesthesia: General     Estimated Blood Loss (mL):  770     Complications: None    Specimens: * No specimens in log *     Implants:   Implant Name Type Inv.  Item Serial No.  Lot No. LRB No. Used Action   LINER ACET 42X54 MM HIP 2 MOBILITY XLPE OR3O - [id number]  LINER ACET 42X54 MM HIP 2 MOBILITY XLPE OR3O  BELL AND NEPH ORTHOPAEDICS_ [id number] Right 1 Implanted   SHELL ACET MOD REDAPT 54MM - [id number]  SHELL ACET MOD REDAPT 54MM  BELL AND NEPHEW_ [id number] Right 1 Implanted   SCREW BNE L25MM TI ALLY ACET HIP ASYA ANG RENO REV REDAPT - [id number]  SCREW BNE L25MM TI ALLY ACET HIP ASYA ANG RENO REV REDAPT  BELL AND NEPHEW ORTHOPAEDICS_ [id number] Right 1 Implanted   CEMENT BNE 40GM FULL DOSE PMMA W/ GENT M VISC RADPQ FAST - [id number]  CEMENT BNE 40GM FULL DOSE PMMA W/ GENT M VISC RADPQ FAST  [employer] ORTHOPEDICS_WD [id number] Right 2 Implanted   HEAD FEM OD28MM +0MM OFFSET DELT CERAMIC 12 14 TAPR - [id number]  HEAD FEM OD28MM +0MM OFFSET DELT CERAMIC 12 14 TAPR  ORTH[employer]ELOP[employer]P_WD [id number] Right 1 Implanted   STEM FEM SZ 11 STD ALPINE BEE - [id number]  STEM FEM SZ 11 STD ALPINE BEE  ORTH[employer] DEVELOP[employer]P_WD [id number] Right 1 Implanted   CENTRALIZER STEM OD11MM ALPINE - [id number]  CENTRALIZER STEM OD11MM ALPINE  ORTHO [employer]_WD [id number] Right 1 Implanted   INSERT TIB 28X42 MM 2 MOBILITY XLPE OR3O - [id number]  INSERT TIB 28X42 MM 2 MOBILITY XLPE Yee Agu[employer] AND NEPHEW ORTHOPAEDICS_WD [id number] Right 1 Implanted       Drains: * No LDAs found *    Findings: see above op note    Detailed Description of Procedure:   See above op note    Electronically Signed by Nickolas Navarro MD on 6/27/2022 at 2:03 PM

## 2022-06-27 NOTE — PROGRESS NOTES
Problem: Self Care Deficits Care Plan (Adult)  Goal: *Acute Goals and Plan of Care (Insert Text)  Description: Initial Occupational Therapy Goals (6/27/2022) Within 7 day(s):    1. Patient will perform grooming seated with setup/supervision for increased independence with ADLs. 2. Patient will perform LB dressing with min A & A/E PRN for increased independence with ADLs. 3. Patient will perform toilet transfer with CGA for increased independence with ADLs. 4. Patient will perform all aspects of toileting with CGA for increased independence with ADLs. 5. Patient will independently apply energy conservation techniques with 1 verbal cue(s)for increased independence with ADLs. 6. Patient will perform upper body dressing with supervision for increased independence with ADLs. Outcome: Progressing Towards Goal     OCCUPATIONAL THERAPY EVALUATION    Patient: Christie Melgar (73 y.o. female)  Date: 6/27/2022  Primary Diagnosis: Primary localized osteoarthritis of right hip [M16.11]  Procedure(s) (LRB):  RIGHT TOTAL HIP REPLACEMENT, ANTERIOR APPROACH W/C-ARM (Right) Day of Surgery   Precautions: Fall,WBAT  PLOF: pt lives with son, ambulates with RW inside home, has aid M-F to assist with bathing and other ADLs    ASSESSMENT AND RECOMMENDATIONS:  Based on the objective data described below, the patient presents with RLE decreased ROM and strength affecting LE ADLs. Pt found supine in bed, /64, reporting pain 2/10, agreeable to therapy after motivation provided. Pt seen with PT for additional set of skilled hands. Pt demonstrates decreased attention/command following during tasks. Pt seems to perform best with simple one step instructions. Pt sat up to EOB with min A, pt requires additional time to complete all functional mobility. Pt performed STS with mod Ax2, B knees in flexed position, pt unable to stand up straight, unable to take any steps. Pt able to stand ~1 minute and then returned to seated.  Pt scooted along EOB to the R with min A, and required mod Ax2 to return to supine. Son present during session, son assists pt at home with STS transfers and bed mobility. Son reports pt requires extended amount of time to eat meals, and he will typically mince pt's food for her, RN notified. Recommend SNF placement (vs. Home with home health pending progress) at hospital d/c to maximize safety/independence with ADLs. Education: Reviewed home safety, body mechanics, importance of moving every hour to prevent joint stiffness, role of ice for edema/pain control, Rolling Walker management/safety, and adaptive dressing techniques with patient verbalizing  understanding at this time     Patient will benefit from skilled intervention to address the above impairments. Patient's rehabilitation potential is considered to be Fair  Factors which may influence rehabilitation potential include:   []             None noted  [x]             Mental ability/status  [x]             Medical condition  [x]             Home/family situation and support systems  [x]             Safety awareness  []             Pain tolerance/management  []             Other:        PLAN :  Recommendations and Planned Interventions:   [x]               Self Care Training                  [x]      Therapeutic Activities  [x]               Functional Mobility Training   [x]      Cognitive Retraining  [x]               Therapeutic Exercises           [x]      Endurance Activities  [x]               Balance Training                    []      Neuromuscular Re-Education  []               Visual/Perceptual Training     [x]      Home Safety Training  [x]               Patient Education                   [x]      Family Training/Education  []               Other (comment):    Frequency/Duration: Patient will be followed by Occupational Therapy 1-2 times per day/4-7 days per week to address goals.   Discharge Recommendations: SNF (vs. Home with home health pending hospital progress)  Further Equipment Recommendations for Discharge: N/A     SUBJECTIVE:   Patient stated i'm trying.     OBJECTIVE DATA SUMMARY:     Past Medical History:   Diagnosis Date    Asthma     Cancer ([address]     skin cancer-melanomas    GERD (gastroesophageal reflux disease)     Hypertension     OA (osteoarthritis) 2012    Right hip, knee    Primary localized osteoarthritis of right hip 6/23/2022    Scoliosis     of spine     Past Surgical History:   Procedure Laterality Date    HX GI  2020    upper endoscopy to repair bleeding ulcer    HX GI  2007    Colonoscopy-Dr. Bari Eisenmenger    HX HEENT Bilateral 2002    Lasic surgery    HX HEENT  2021    Molar extraction by oral surgeon    HX ORTHOPAEDIC Right 2019    leg surg with hardware after fall-Dr. Carlos De Luna OTHER SURGICAL      multiple skin cancer removal     Barriers to Learning/Limitations: yes;  cognitive and physical  Compensate with: visual, verbal, tactile, kinesthetic cues/model    Home Situation/Prior Level of Function:   Home Situation  Home Environment: Private residence  Wheelchair Ramp: Yes  One/Two Story Residence: Two story, live on 1st floor  Living Alone: No  Support Systems: Child(grey)  Patient Expects to be Discharged to[de-identified] Home with home health  Current DME Used/Available at Home: [employer], [employer]  []  Right hand dominant   []  Left hand dominant    Cognitive/Behavioral Status:  Neurologic State: Drowsy  Orientation Level: Oriented to person  Cognition: Decreased attention/concentration;Decreased command following  Safety/Judgement: Decreased awareness of environment;Decreased awareness of need for safety;Decreased insight into deficits    Skin: R hip incision w/ Mepilex   Edema: compression hose in place & applied ice     Coordination: BUE  Coordination: Within functional limits  Fine Motor Skills-Upper: Left Intact; Right Intact    Gross Motor Skills-Upper: Left Intact; Right Intact    Balance:  Sitting: Intact  Standing: Impaired; With support  Standing - Static: Poor;Fair;Constant support  Standing - Dynamic : Not tested    Strength: BUE  Strength: Generally decreased, functional    Tone & Sensation:BUE  Tone: Normal  Sensation: Impaired (R hip)    Range of Motion: BUE  AROM: Generally decreased, functional    Functional Mobility and Transfers for ADLs:  Bed Mobility:  Supine to Sit: Minimum assistance; Additional time (vc)  Sit to Supine: Moderate assistance;Assist x2 (vc)  Scooting: Minimum assistance; Additional time (vc)  Transfers:  Sit to Stand: Moderate assistance;Assist x2; Additional time (bed elevated)    ADL Assessment:  Feeding: Minimum assistance  Oral Facial Hygiene/Grooming: Minimum assistance  Bathing: Maximum assistance  Upper Body Dressing: Moderate assistance  Lower Body Dressing: Total assistance  Toileting: Maximum assistance    ADL Intervention:  Cognitive Retraining  Safety/Judgement: Decreased awareness of environment;Decreased awareness of need for safety;Decreased insight into deficits    Pain:  Pain level pre-treatment: 2/10  Pain level post-treatment: 2/10  Pain Intervention(s): Medication administer by Nursing (see MAR); Rest, Ice, Repositioning   Response to intervention: Nurse notified, see doc flow     Activity Tolerance:   Poor. Patient able to stand ~1 minute(s). Patient requires assist to complete ADLs. Pt limited by pain, strength, ROM, balance    Please refer to the flowsheet for vital signs taken during this treatment. After treatment:   []  Patient left in no apparent distress sitting up in chair  []  Patient sitting on EOB  [x]  Patient left in no apparent distress in bed  [x]  Call bell left within reach  [x]  Nursing notified  [x]  Caregiver present  [x]  Ice applied  [x]  SCD's on while back in bed  [] Bed alarm activated    COMMUNICATION/EDUCATION:   Communication/Collaboration:  [x]       Role of Occupational Therapy in the acute care setting.   [x]      Home safety education was provided and the patient/caregiver indicated understanding. [x]      Patient/family have participated as able in goal setting and plan of care. [x]      Patient/family agree to work toward stated goals and plan of care. []      Patient understands intent and goals of therapy, but is neutral about his/her participation. []      Patient is unable to participate in plan of care at this time. Thank you for this referral.  Sandra Price, OTR/L  Time Calculation: 38 mins    Eval Complexity: History: MEDIUM Complexity : Expanded review of history including physical, cognitive and psychosocial  history ; Examination: MEDIUM Complexity : 3-5 performance deficits relating to physical, cognitive , or psychosocial skils that result in activity limitations and / or participation restrictions; Decision Making:MEDIUM Complexity : Patient may present with comorbidities that affect occupational performnce.  Miniml to moderate modification of tasks or assistance (eg, physical or verbal ) with assesment(s) is necessary to enable patient to complete evaluation

## 2022-06-28 LAB
ANION GAP SERPL CALC-SCNC: 6 MMOL/L (ref 3–18)
BASOPHILS # BLD: 0 K/UL (ref 0–0.1)
BASOPHILS NFR BLD: 0 % (ref 0–2)
BUN SERPL-MCNC: 19 MG/DL (ref 7–18)
BUN/CREAT SERPL: 22 (ref 12–20)
CALCIUM SERPL-MCNC: 7.7 MG/DL (ref 8.5–10.1)
CHLORIDE SERPL-SCNC: 105 MMOL/L (ref 100–111)
CO2 SERPL-SCNC: 27 MMOL/L (ref 21–32)
CREAT SERPL-MCNC: 0.86 MG/DL (ref 0.6–1.3)
DIFFERENTIAL METHOD BLD: ABNORMAL
EOSINOPHIL # BLD: 0 K/UL (ref 0–0.4)
EOSINOPHIL NFR BLD: 0 % (ref 0–5)
ERYTHROCYTE [DISTWIDTH] IN BLOOD BY AUTOMATED COUNT: 14.1 % (ref 11.6–14.5)
GLUCOSE SERPL-MCNC: 123 MG/DL (ref 74–99)
HCT VFR BLD AUTO: 30.3 % (ref 35–45)
HGB BLD-MCNC: 9.5 G/DL (ref 12–16)
IMM GRANULOCYTES # BLD AUTO: 0.1 K/UL (ref 0–0.04)
IMM GRANULOCYTES NFR BLD AUTO: 1 % (ref 0–0.5)
LYMPHOCYTES # BLD: 1.1 K/UL (ref 0.9–3.6)
LYMPHOCYTES NFR BLD: 7 % (ref 21–52)
MCH RBC QN AUTO: 31.3 PG (ref 24–34)
MCHC RBC AUTO-ENTMCNC: 31.4 G/DL (ref 31–37)
MCV RBC AUTO: 99.7 FL (ref 78–100)
MONOCYTES # BLD: 1.2 K/UL (ref 0.05–1.2)
MONOCYTES NFR BLD: 8 % (ref 3–10)
NEUTS SEG # BLD: 12.8 K/UL (ref 1.8–8)
NEUTS SEG NFR BLD: 84 % (ref 40–73)
NRBC # BLD: 0 K/UL (ref 0–0.01)
NRBC BLD-RTO: 0 PER 100 WBC
PLATELET # BLD AUTO: 202 K/UL (ref 135–420)
PMV BLD AUTO: 11.4 FL (ref 9.2–11.8)
POTASSIUM SERPL-SCNC: 4.1 MMOL/L (ref 3.5–5.5)
RBC # BLD AUTO: 3.04 M/UL (ref 4.2–5.3)
SODIUM SERPL-SCNC: 138 MMOL/L (ref 136–145)
WBC # BLD AUTO: 15.3 K/UL (ref 4.6–13.2)

## 2022-06-28 PROCEDURE — 36415 COLL VENOUS BLD VENIPUNCTURE: CPT

## 2022-06-28 PROCEDURE — 97116 GAIT TRAINING THERAPY: CPT

## 2022-06-28 PROCEDURE — 80048 BASIC METABOLIC PNL TOTAL CA: CPT

## 2022-06-28 PROCEDURE — 97530 THERAPEUTIC ACTIVITIES: CPT

## 2022-06-28 PROCEDURE — 74011250637 HC RX REV CODE- 250/637: Performed by: PHYSICIAN ASSISTANT

## 2022-06-28 PROCEDURE — 85025 COMPLETE CBC W/AUTO DIFF WBC: CPT

## 2022-06-28 PROCEDURE — 74011250637 HC RX REV CODE- 250/637: Performed by: ORTHOPAEDIC SURGERY

## 2022-06-28 PROCEDURE — 74011000250 HC RX REV CODE- 250: Performed by: PHYSICIAN ASSISTANT

## 2022-06-28 PROCEDURE — 65270000029 HC RM PRIVATE

## 2022-06-28 PROCEDURE — 74011250636 HC RX REV CODE- 250/636: Performed by: PHYSICIAN ASSISTANT

## 2022-06-28 RX ORDER — FUROSEMIDE 20 MG/1
20 TABLET ORAL 2 TIMES DAILY
Status: DISCONTINUED | OUTPATIENT
Start: 2022-06-28 | End: 2022-06-29 | Stop reason: HOSPADM

## 2022-06-28 RX ORDER — FUROSEMIDE 20 MG/1
20 TABLET ORAL 2 TIMES DAILY
Status: DISCONTINUED | OUTPATIENT
Start: 2022-06-28 | End: 2022-06-28

## 2022-06-28 RX ADMIN — SODIUM CHLORIDE, PRESERVATIVE FREE 10 ML: 5 INJECTION INTRAVENOUS at 14:00

## 2022-06-28 RX ADMIN — KETOROLAC TROMETHAMINE 15 MG: 15 INJECTION, SOLUTION INTRAMUSCULAR; INTRAVENOUS at 05:25

## 2022-06-28 RX ADMIN — CEFAZOLIN 2 G: 10 INJECTION, POWDER, FOR SOLUTION INTRAVENOUS at 03:04

## 2022-06-28 RX ADMIN — ACETAMINOPHEN 650 MG: 325 TABLET ORAL at 05:25

## 2022-06-28 RX ADMIN — FERROUS SULFATE TAB 325 MG (65 MG ELEMENTAL FE) 325 MG: 325 (65 FE) TAB at 15:56

## 2022-06-28 RX ADMIN — ASPIRIN 81 MG: 81 TABLET, COATED ORAL at 09:04

## 2022-06-28 RX ADMIN — KETOROLAC TROMETHAMINE 15 MG: 15 INJECTION, SOLUTION INTRAMUSCULAR; INTRAVENOUS at 14:33

## 2022-06-28 RX ADMIN — DOCUSATE SODIUM 100 MG: 100 CAPSULE ORAL at 21:32

## 2022-06-28 RX ADMIN — FERROUS SULFATE TAB 325 MG (65 MG ELEMENTAL FE) 325 MG: 325 (65 FE) TAB at 21:32

## 2022-06-28 RX ADMIN — FUROSEMIDE 20 MG: 20 TABLET ORAL at 21:32

## 2022-06-28 RX ADMIN — ACETAMINOPHEN 650 MG: 325 TABLET ORAL at 14:33

## 2022-06-28 RX ADMIN — ACETAMINOPHEN 650 MG: 325 TABLET ORAL at 18:00

## 2022-06-28 RX ADMIN — SODIUM CHLORIDE, PRESERVATIVE FREE 10 ML: 5 INJECTION INTRAVENOUS at 21:33

## 2022-06-28 RX ADMIN — ASPIRIN 81 MG: 81 TABLET, COATED ORAL at 21:32

## 2022-06-28 RX ADMIN — FUROSEMIDE 20 MG: 20 TABLET ORAL at 15:41

## 2022-06-28 RX ADMIN — SODIUM CHLORIDE, PRESERVATIVE FREE 10 ML: 5 INJECTION INTRAVENOUS at 05:25

## 2022-06-28 RX ADMIN — ACETAMINOPHEN 650 MG: 325 TABLET ORAL at 23:39

## 2022-06-28 RX ADMIN — FERROUS SULFATE TAB 325 MG (65 MG ELEMENTAL FE) 325 MG: 325 (65 FE) TAB at 09:05

## 2022-06-28 RX ADMIN — SUCRALFATE 1 G: 1 TABLET ORAL at 09:04

## 2022-06-28 RX ADMIN — POTASSIUM BICARBONATE 20 MEQ: 782 TABLET, EFFERVESCENT ORAL at 09:04

## 2022-06-28 NOTE — ROUTINE PROCESS
Bedside and Verbal shift change report given to Em Glasgow RN by Michael Mccoy RN. Report included the following information SBAR, Kardex, Intake/Output and MAR.

## 2022-06-28 NOTE — PROGRESS NOTES
Progress Note        Patient: Steph Hudson MRN: [id number]  SSN: [id number]    YOB: 1930  Age: 80 y.o. Sex: female      1 Day Post-Op status post Procedure(s) (LRB):  RIGHT TOTAL HIP REPLACEMENT, ANTERIOR APPROACH W/C-ARM (Right)    Admit Date: 2022  Admit Diagnosis: Primary localized osteoarthritis of right hip [M16.11]    Subjective:      Doing well. No complaints. No SOB. No Chest Pain. No Nausea or Vomiting. No problems eating or voiding. Objective:        Temp (24hrs), Av.8 °F (36.6 °C), Min:97.4 °F (36.3 °C), Max:98.6 °F (37 °C)    Body mass index is 19.72 kg/m². Patient Vitals for the past 12 hrs:   BP Temp Pulse Resp SpO2 Weight   22 0719 (!) 85/46 97.5 °F (36.4 °C) 83 18 96 % --   22 0326 (!) 100/54 97.5 °F (36.4 °C) 81 16 97 % --   22 2209 107/65 97.8 °F (36.6 °C) 82 16 100 % 52.1 kg (114 lb 14.4 oz)   22 98/64 97.4 °F (36.3 °C) 79 16 100 % --     Recent Labs     22  0527   HGB 9.5*   HCT 30.3*      K 4.1      CO2 27   BUN 19*   CREA 0.86   *       Physical Exam:  Vital Signs are Stable. No Acute Distress. Alert and Oriented. Negative Homans sign. Toes AROM Full. Neurovascular exam is normal.    Dressing is Clean, Dry, and Intact. Assessment/Plan:     1. Pt stable s/p thr. She was minimal ambulator at home, lives with son and was primarily transfer only to and from bed to wheelchair to toilet. Would rehab appropriately with goal for independent transfers and minimal ambulation.    2. D/c planning    Continue PT/OT  Discharge Plan: Home    Signed By: Lisa Cunningham MD     2022

## 2022-06-28 NOTE — PROGRESS NOTES
Problem: Mobility Impaired (Adult and Pediatric)  Goal: *Acute Goals and Plan of Care (Insert Text)  Description: In 1-7 days pt will be able to perform:  ST.  Bed mobility:  Rolling L to R to L CGA for positioning. 2.  Supine to sit to supine CGA with HR for meals. 3.  Sit to stand to sit SBA with RW in prep for ambulation. LT.  Gait:  Ambulate >50ft CGA with RW, WBAT, for home/ mobility. 2.  Activity tolerance: Tolerate up in chair 1-2 hours for ADL's.  3.  Patient/Family Education:  Patient/family to be independent with HEP for follow-up care and safe discharge. 2022 1417 by Sofiya Austin, PTA  Outcome: Progressing Towards Goal  2022 1032 by Sofiya Austin, PTA  Outcome: Not Progressing Towards Goal   []  Patient has met MD carolyn patrick for d/c home   []  Recommend HH with 24 hour adult care   [x]  Benefit from additional acute PT session to address:  possible rehab placement    PHYSICAL THERAPY TREATMENT    Patient: Timothy Dennis (60 y.o. female)  Date: 2022  Diagnosis: Primary localized osteoarthritis of right hip [M16.11] Primary localized osteoarthritis of right hip  Procedure(s) (LRB):  RIGHT TOTAL HIP REPLACEMENT, ANTERIOR APPROACH W/C-ARM (Right) 1 Day Post-Op  Precautions: Fall,WBAT  PLOF: ambulatory in home with RW, w/c in community, lives with son, sleeps on couch    ASSESSMENT:  Pt supine in bed upon arrival.  SpO2 at rest on 3L 95%. Janeth and increased time to carry out sitting up EOB. Sit to stand performed with Janeth and bed in low position. Pt ambulated 20ft with RW and on 3L O2. Step to gt pattern, slightly toe walking on the RLE (likely due to heel pain), no LOB, increased VC and MC for RW mgmt and posture. Returned to sitting EOB. SpO2 88-89% on 3L s/p ambulation. 2 minutes needed to recover to 95% and VC for seated posture to increased lung volume. Min/modA back to supine in bed.   Reduced SpO2 to 2L and will check saturation in 10 minutes. Lee Hyde .. SpO2 88% on 2L, increased O2 to 3L and notified nurse. Progression toward goals:   []      Improving appropriately and progressing toward goals  [x]      Improving slowly and progressing toward goals  []      Not making progress toward goals and plan of care will be adjusted     PLAN:  Patient continues to benefit from skilled intervention to address the above impairments. Continue treatment per established plan of care. Discharge Recommendations:  Home Physical Therapy vs Skilled Nursing Facility (son wants to take pt home)  Further Equipment Recommendations for Discharge:  rolling walker     SUBJECTIVE:   Patient stated Mickiel Shorten.     OBJECTIVE DATA SUMMARY:   Critical Behavior:  Neurologic State: Alert,Confused  Orientation Level: Disoriented to situation,Disoriented to time,Oriented to person,Oriented to place  Cognition: Decreased attention/concentration,Follows commands  Safety/Judgement: Decreased awareness of environment,Decreased awareness of need for safety,Decreased insight into deficits  Functional Mobility Training:  Bed Mobility:    Supine to Sit: Minimum assistance; Additional time  Sit to Supine: Minimum assistance; Moderate assistance; Additional time  Scooting: Additional time;Stand-by assistance  Transfers:  Sit to Stand: Minimum assistance  Stand to Sit: Minimum assistance  Balance:  Sitting: Intact; With support  Standing: Impaired; With support  Standing - Static: Fair (-)  Standing - Dynamic : Fair (-)   Ambulation/Gait Training:  Distance (ft): 20 Feet (ft)  Assistive Device: Gait belt;Walker, rolling  Ambulation - Level of Assistance: Minimal assistance  Gait Abnormalities: Antalgic;Decreased step clearance; Toe walking  Right Side Weight Bearing: As tolerated  Speed/Francie: Slow;Delayed  Step Length: Right shortened;Left shortened        Pain:  Pain level pre-treatment: 7/10  Pain level post-treatment: 8/10   Pain Intervention(s): Medication (see MAR);  Rest, Ice, Repositioning Response to intervention: Nurse notified, See doc flow    Activity Tolerance:   Fair-  Please refer to the flowsheet for vital signs taken during this treatment. After treatment:   [] Patient left in no apparent distress sitting up in chair  [x] Patient left in no apparent distress in bed  [x] Call bell left within reach  [x] Nursing notified  [x] Caregiver present  [] Bed alarm activated  [x] SCDs applied      COMMUNICATION/EDUCATION:   [x]         Role of Physical Therapy in the acute care setting. [x]         Fall prevention education was provided and the patient/caregiver indicated understanding. [x]         Patient/family have participated as able in working toward goals and plan of care. [x]         Patient/family agree to work toward stated goals and plan of care. []         Patient understands intent and goals of therapy, but is neutral about his/her participation.   []         Patient is unable to participate in stated goals/plan of care: ongoing with therapy staff.  []         Other:        Ani Osorio, PTA   Time Calculation: 24 mins

## 2022-06-28 NOTE — PROGRESS NOTES
Problem: Mobility Impaired (Adult and Pediatric)  Goal: *Acute Goals and Plan of Care (Insert Text)  Description: In 1-7 days pt will be able to perform:  ST.  Bed mobility:  Rolling L to R to L CGA for positioning. 2.  Supine to sit to supine CGA with HR for meals. 3.  Sit to stand to sit SBA with RW in prep for ambulation. LT.  Gait:  Ambulate >50ft CGA with RW, WBAT, for home/ mobility. 2.  Activity tolerance: Tolerate up in chair 1-2 hours for ADL's.  3.  Patient/Family Education:  Patient/family to be independent with HEP for follow-up care and safe discharge. Note: [x]  Patient has met MD carolyn patrick for d/c home   [x]  Recommend HH with 24 hour adult care   []  Benefit from additional acute PT session to address:      PHYSICAL THERAPY TREATMENT    Patient: Raina Camp (75 y.o. female)  Date: 2022  Diagnosis: Primary localized osteoarthritis of right hip [M16.11] Primary localized osteoarthritis of right hip  Procedure(s) (LRB):  RIGHT TOTAL HIP REPLACEMENT, ANTERIOR APPROACH W/C-ARM (Right) 1 Day Post-Op  Precautions: Fall,WBAT    ASSESSMENT:  At request of PA pt seen for additional PT session and requested to wean O2 in hopes of d/c home tonight. Discussed with RN prior to session. Pt supine in bed upon entering, on 2L O2. SPO2 97%. O2 removed and pt quickly decreases to 71% with labored breathing and evident use of accessory muscles. Pt become more lethargic when SPO2 decreases. Attempted to utilize incentive spirometer and cues for breathing technique with no improvement. 2L O2 reapplied and after several minutes returned to 91%. Pt transitioned to sitting EOB with Js. Re attempted to wean to 1L sitting EOB with cues for posture and breathing technique and pt desaturation to 80%. Returned to 2L O2.   Pt performed stand transfer with much encouragement and extended time for rest. Pt ambulated 20 feet with RW and CGA, with cues almost every step to continue, fatigues quickly. Patient's son present throughout session, and reports this is nearly her baseline and is able to give her this level of assistance at home. He has multiple healthcare professional coming into the home, w/c for mobility as needs and no steps to enter home. RN notified of pt performance and technique. Attempted to call PA. Consider home health PT at d/c. Progression toward goals:   []      Improving appropriately and progressing toward goals  [x]      Improving slowly and progressing toward goals  []      Not making progress toward goals and plan of care will be adjusted     PLAN:  Patient continues to benefit from skilled intervention to address the above impairments. Continue treatment per established plan of care. Discharge Recommendations:  Home Health with 24 hour assistance and supervision  Further Equipment Recommendations for Discharge:  pt has all necessary equipment at home     SUBJECTIVE:   Patient stated I just can't do it, I can't breathe.     OBJECTIVE DATA SUMMARY:   Critical Behavior:  Neurologic State: Alert,Confused  Orientation Level: Disoriented to situation,Disoriented to time,Oriented to person,Oriented to place  Cognition: Decreased attention/concentration,Follows commands  Safety/Judgement: Decreased awareness of environment,Decreased awareness of need for safety,Decreased insight into deficits  Functional Mobility Training:  Bed Mobility:   Supine to Sit: Minimum assistance; Additional time;Bed Modified  Sit to Supine: Minimum assistance; Additional time;Bed Modified  Scooting: Additional time;Stand-by assistance  Transfers:  Sit to Stand: Minimum assistance  Stand to Sit: Minimum assistance  Balance:  Sitting: Intact; With support  Standing: Impaired; With support  Standing - Static: Fair  Standing - Dynamic : Fair   Ambulation/Gait Training:  Distance (ft): 20 Feet (ft)  Assistive Device: Gait belt;Walker, rolling  Ambulation - Level of Assistance: Minimal assistance  Gait Abnormalities: Antalgic;Decreased step clearance; Step to gait  Right Side Weight Bearing: As tolerated  Speed/Francie: Slow;Delayed  Step Length: Right shortened;Left shortened  Pain:  Pain level pre-treatment: 3/10  Pain level post-treatment: 3/10   Pain Intervention(s): Medication (see MAR); Rest, Ice, Repositioning   Response to intervention: Nurse notified, See doc flow    Activity Tolerance:   Poor  Please refer to the flowsheet for vital signs taken during this treatment. After treatment:   [] Patient left in no apparent distress sitting up in chair  [x] Patient left in no apparent distress in bed  [x] Call bell left within reach  [x] Nursing notified  [] Caregiver present  [] Bed alarm activated  [] SCDs applied      COMMUNICATION/EDUCATION:   [x]         Role of Physical Therapy in the acute care setting. [x]         Fall prevention education was provided and the patient/caregiver indicated understanding. [x]         Patient/family have participated as able in working toward goals and plan of care. []         Patient/family agree to work toward stated goals and plan of care. []         Patient understands intent and goals of therapy, but is neutral about his/her participation.   []         Patient is unable to participate in stated goals/plan of care: ongoing with therapy staff.  []         Other:        Karin Stewart   Time Calculation: 53 mins

## 2022-06-28 NOTE — PROGRESS NOTES
Problem: Mobility Impaired (Adult and Pediatric)  Goal: *Acute Goals and Plan of Care (Insert Text)  Description: In 1-7 days pt will be able to perform:  ST.  Bed mobility:  Rolling L to R to L CGA for positioning. 2.  Supine to sit to supine CGA with HR for meals. 3.  Sit to stand to sit SBA with RW in prep for ambulation. LT.  Gait:  Ambulate >50ft CGA with RW, WBAT, for home/ mobility. 2.  Activity tolerance: Tolerate up in chair 1-2 hours for ADL's.  3.  Patient/Family Education:  Patient/family to be independent with HEP for follow-up care and safe discharge. Outcome: Not Progressing Towards Goal   []  Patient has met MD mobilization critieria for d/c home   []  Recommend HH with 24 hour adult care   [x]  Benefit from additional acute PT session to address:  possible SNF placement    PHYSICAL THERAPY TREATMENT    Patient: Cami Mccain (46 y.o. female)  Date: 2022  Diagnosis: Primary localized osteoarthritis of right hip [M16.11] Primary localized osteoarthritis of right hip  Procedure(s) (LRB):  RIGHT TOTAL HIP REPLACEMENT, ANTERIOR APPROACH W/C-ARM (Right) 1 Day Post-Op  Precautions: Fall,WBAT  PLOF: ambulatory with a RW in the home, w/c outside, lives with son, sleeps on the couch    ASSESSMENT:  Pt supine in bed upon arrival.  SpO2 97% on 2L. Removed nasal cannula. Js and increased time needed to sit up EOB. SpO2 very quickly dropped to 77% then 69% and maintained. Attempted deep breathing tech but pt breathing very shallow with head down and shoulders rounded. Attempted to adjust pt posture with no success. ModA back to supine. Increased O2 to 3L, retrieved nurse. Upon returning to room with nurse, SpO2 99% on 3L. Pt son wants to take pt home and push PT but pt is not safe to progress at this time due to severe drop in O2 saturation with minimal activity.   Attempted to explain the bodies stress after surgery to son and possible problems that can arise, son is very resistant to education and safety. Progression toward goals:   []      Improving appropriately and progressing toward goals  [x]      Improving slowly and progressing toward goals  []      Not making progress toward goals and plan of care will be adjusted     PLAN:  Patient continues to benefit from skilled intervention to address the above impairments. Continue treatment per established plan of care. Discharge Recommendations:  [facility]on[facility] Liriano  Further Equipment Recommendations for Discharge:  rolling walker     SUBJECTIVE:   Patient stated My foot hurts.   (R heel pain, floated heel with pillow at end of session)    OBJECTIVE DATA SUMMARY:   Critical Behavior:  Neurologic State: Alert,Confused  Orientation Level: Disoriented to situation,Disoriented to time,Oriented to person,Oriented to place  Cognition: Decreased attention/concentration,Follows commands  Safety/Judgement: Decreased awareness of environment,Decreased awareness of need for safety,Decreased insight into deficits  Functional Mobility Training:  Bed Mobility:    Supine to Sit: Minimum assistance; Additional time  Sit to Supine: Moderate assistance;Maximum assistance  Balance:  Sitting: Intact; With support         Pain:  Pain level pre-treatment: 0/10  Pain level post-treatment: 3/10   Pain Intervention(s): Medication (see MAR); Rest, Ice, Repositioning   Response to intervention: Nurse notified, See doc flow    Activity Tolerance:   Poor due to O2 saturation  Please refer to the flowsheet for vital signs taken during this treatment. After treatment:   [] Patient left in no apparent distress sitting up in chair  [x] Patient left in no apparent distress in bed  [x] Call bell left within reach  [x] Nursing notified  [x] Caregiver present  [] Bed alarm activated  [x] SCDs applied      COMMUNICATION/EDUCATION:   [x]         Role of Physical Therapy in the acute care setting.   [x]         Fall prevention education was provided and the patient/caregiver indicated understanding. [x]         Patient/family have participated as able in working toward goals and plan of care. [x]         Patient/family agree to work toward stated goals and plan of care. []         Patient understands intent and goals of therapy, but is neutral about his/her participation.   []         Patient is unable to participate in stated goals/plan of care: ongoing with therapy staff.  []         Other:        Skye Alejandre, PTA   Time Calculation: 29 mins

## 2022-06-29 VITALS
HEART RATE: 72 BPM | TEMPERATURE: 97.6 F | OXYGEN SATURATION: 100 % | RESPIRATION RATE: 16 BRPM | WEIGHT: 114.9 LBS | DIASTOLIC BLOOD PRESSURE: 48 MMHG | SYSTOLIC BLOOD PRESSURE: 91 MMHG | BODY MASS INDEX: 19.72 KG/M2

## 2022-06-29 LAB
ANION GAP SERPL CALC-SCNC: 3 MMOL/L (ref 3–18)
BASOPHILS # BLD: 0 K/UL (ref 0–0.1)
BASOPHILS NFR BLD: 0 % (ref 0–2)
BUN SERPL-MCNC: 17 MG/DL (ref 7–18)
BUN/CREAT SERPL: 27 (ref 12–20)
CALCIUM SERPL-MCNC: 6.9 MG/DL (ref 8.5–10.1)
CHLORIDE SERPL-SCNC: 108 MMOL/L (ref 100–111)
CO2 SERPL-SCNC: 29 MMOL/L (ref 21–32)
CREAT SERPL-MCNC: 0.64 MG/DL (ref 0.6–1.3)
DIFFERENTIAL METHOD BLD: ABNORMAL
EOSINOPHIL # BLD: 0.1 K/UL (ref 0–0.4)
EOSINOPHIL NFR BLD: 1 % (ref 0–5)
ERYTHROCYTE [DISTWIDTH] IN BLOOD BY AUTOMATED COUNT: 14.5 % (ref 11.6–14.5)
GLUCOSE SERPL-MCNC: 80 MG/DL (ref 74–99)
HCT VFR BLD AUTO: 26.9 % (ref 35–45)
HGB BLD-MCNC: 8.4 G/DL (ref 12–16)
IMM GRANULOCYTES # BLD AUTO: 0.1 K/UL (ref 0–0.04)
IMM GRANULOCYTES NFR BLD AUTO: 1 % (ref 0–0.5)
LYMPHOCYTES # BLD: 1.1 K/UL (ref 0.9–3.6)
LYMPHOCYTES NFR BLD: 10 % (ref 21–52)
MCH RBC QN AUTO: 31.3 PG (ref 24–34)
MCHC RBC AUTO-ENTMCNC: 31.2 G/DL (ref 31–37)
MCV RBC AUTO: 100.4 FL (ref 78–100)
MONOCYTES # BLD: 0.9 K/UL (ref 0.05–1.2)
MONOCYTES NFR BLD: 9 % (ref 3–10)
NEUTS SEG # BLD: 8.1 K/UL (ref 1.8–8)
NEUTS SEG NFR BLD: 79 % (ref 40–73)
NRBC # BLD: 0 K/UL (ref 0–0.01)
NRBC BLD-RTO: 0 PER 100 WBC
PLATELET # BLD AUTO: 146 K/UL (ref 135–420)
PMV BLD AUTO: 10.9 FL (ref 9.2–11.8)
POTASSIUM SERPL-SCNC: 3.8 MMOL/L (ref 3.5–5.5)
RBC # BLD AUTO: 2.68 M/UL (ref 4.2–5.3)
SODIUM SERPL-SCNC: 140 MMOL/L (ref 136–145)
WBC # BLD AUTO: 10.3 K/UL (ref 4.6–13.2)

## 2022-06-29 PROCEDURE — 85025 COMPLETE CBC W/AUTO DIFF WBC: CPT

## 2022-06-29 PROCEDURE — 97530 THERAPEUTIC ACTIVITIES: CPT

## 2022-06-29 PROCEDURE — 97116 GAIT TRAINING THERAPY: CPT

## 2022-06-29 PROCEDURE — 74011000250 HC RX REV CODE- 250: Performed by: PHYSICIAN ASSISTANT

## 2022-06-29 PROCEDURE — 74011250637 HC RX REV CODE- 250/637: Performed by: PHYSICIAN ASSISTANT

## 2022-06-29 PROCEDURE — 36415 COLL VENOUS BLD VENIPUNCTURE: CPT

## 2022-06-29 PROCEDURE — 74011250636 HC RX REV CODE- 250/636: Performed by: ORTHOPAEDIC SURGERY

## 2022-06-29 PROCEDURE — 80048 BASIC METABOLIC PNL TOTAL CA: CPT

## 2022-06-29 PROCEDURE — 77010033678 HC OXYGEN DAILY

## 2022-06-29 RX ADMIN — DOCUSATE SODIUM 100 MG: 100 CAPSULE ORAL at 09:02

## 2022-06-29 RX ADMIN — FERROUS SULFATE TAB 325 MG (65 MG ELEMENTAL FE) 325 MG: 325 (65 FE) TAB at 09:02

## 2022-06-29 RX ADMIN — SODIUM CHLORIDE, SODIUM LACTATE, POTASSIUM CHLORIDE, AND CALCIUM CHLORIDE 125 ML/HR: 600; 310; 30; 20 INJECTION, SOLUTION INTRAVENOUS at 09:01

## 2022-06-29 RX ADMIN — ACETAMINOPHEN 650 MG: 325 TABLET ORAL at 12:05

## 2022-06-29 RX ADMIN — ACETAMINOPHEN 650 MG: 325 TABLET ORAL at 06:19

## 2022-06-29 RX ADMIN — ASPIRIN 81 MG: 81 TABLET, COATED ORAL at 09:02

## 2022-06-29 RX ADMIN — SODIUM CHLORIDE, PRESERVATIVE FREE 10 ML: 5 INJECTION INTRAVENOUS at 06:19

## 2022-06-29 RX ADMIN — SUCRALFATE 1 G: 1 TABLET ORAL at 09:02

## 2022-06-29 NOTE — PROGRESS NOTES
Patient discharged home with son at 1 via personal vehicle. Patient denied any pain or discomfort. Discharge instructions reviewed, patient and son verbalized understanding and denied any questions or concerns. Personal belongings sent home with patient. IV  removed. Patient discharged with portable O2 tank, education provided on safety and uses. Advised patient to call unit if they shall have any questions or concerns. Vs stable.

## 2022-06-29 NOTE — PROGRESS NOTES
Oxygen Walk Test    Step 1)   Check patient at rest, without oxygen. Record: Oxygen saturation 83% on room air, at rest.    If oxygen is less that 88%, conclude this test. If more than 88%, continue to step 2. Step 2)  Check patient during 6-minute ambulation/activity. Record: Oxygen saturation is ?% with exertion while walking for 6 minutes. Allow patient to rest as needed and apply oxygen. Step 3)  Check patient at rest again, after applying oxygen. Titrate to maintain >88%    Record: Oxygen saturation is ?%, while at rest, with ? L/min Oxygen. Step 4)   Check patient during 6-minute ambulation/activity with applied oxygen. Record: Oxygen saturation is?% with exertion, while walking for 6 minutes, with ? L/min Oxygen.      Check delivery system:  Via:     [] Nasal Cannula           [] Simple Face Mask            [] Non-Re-Breather Mask          [] Partial Re-Breather Mask            [] Venturi Mask

## 2022-06-29 NOTE — PROGRESS NOTES
Progress Note        Patient: Saloni Salgado MRN: [id number]  SSN: [id number]    YOB: 1930  Age: 80 y.o. Sex: female      2 Days Post-Op status post Procedure(s) (LRB):  RIGHT TOTAL HIP REPLACEMENT, ANTERIOR APPROACH W/C-ARM (Right)    Admit Date: 2022  Admit Diagnosis: Primary localized osteoarthritis of right hip [M16.11]    Subjective:      Doing well. No complaints. No SOB. No Chest Pain. No Nausea or Vomiting. No problems eating or voiding. Objective:        Temp (24hrs), Av °F (36.7 °C), Min:97.5 °F (36.4 °C), Max:98.5 °F (36.9 °C)    Body mass index is 19.72 kg/m². Patient Vitals for the past 12 hrs:   BP Temp Pulse Resp SpO2   22 0300 (!) 99/51 98.1 °F (36.7 °C) 77 16 98 %   22 2206 (!) 93/55 98.5 °F (36.9 °C) 80 18 100 %   22 1926 102/83 98.5 °F (36.9 °C) 87 16 98 %     Recent Labs     22  0334   HGB 8.4*   HCT 26.9*      K 3.8      CO2 29   BUN 17   CREA 0.64   GLU 80       Physical Exam:  Vital Signs are Stable. No Acute Distress. Alert and Oriented. Negative Homans sign. Toes AROM Full. Neurovascular exam is normal.    Dressing is Clean, Dry, and Intact. Expected swelling thigh, mild bruising noted. N/v intact. Assessment/Plan:     1. Stable s/p thr.   2. Pt anxious to go home. Continue oob as tolerated. Arrange for home O2 as needed.   3. D/c planning    Continue PT/OT  Discharge Plan: Home    Signed By: Aretha Mera MD     2022

## 2022-06-29 NOTE — PROGRESS NOTES
Problem: Mobility Impaired (Adult and Pediatric)  Goal: *Acute Goals and Plan of Care (Insert Text)  Description: In 1-7 days pt will be able to perform:  ST.  Bed mobility:  Rolling L to R to L CGA for positioning. 2.  Supine to sit to supine CGA with HR for meals. 3.  Sit to stand to sit SBA with RW in prep for ambulation. LT.  Gait:  Ambulate >50ft CGA with RW, WBAT, for home/ mobility. 2.  Activity tolerance: Tolerate up in chair 1-2 hours for ADL's.  3.  Patient/Family Education:  Patient/family to be independent with HEP for follow-up care and safe discharge. Outcome: Not Progressing Towards Goal   []  Patient has met MD mobilization critieria for d/c home   []  Recommend HH with 24 hour adult care   [x]  Benefit from additional acute PT session to address:  poor O2 saturation with minimal to no activity, failed O2 test, not safe to d/c home    PHYSICAL THERAPY TREATMENT    Patient: Rudy Fox (51 y.o. female)  Date: 2022  Diagnosis: Primary localized osteoarthritis of right hip [M16.11] Primary localized osteoarthritis of right hip  Procedure(s) (LRB):  RIGHT TOTAL HIP REPLACEMENT, ANTERIOR APPROACH W/C-ARM (Right) 2 Days Post-Op  Precautions: Fall,WBAT  PLOF: ambulatory in home with RW, w/c in community, lives with son, sleeps on couch    ASSESSMENT:  Removed nasal cannula for O2 walk test.  After 7 minutes SpO2 83% on RA. Test terminated. Donned nasal cannula at 2L for PT session. SpO2 increased to 93% with 2L. Increased time and Janeth needed with RLE to sit up EOB. Long pause sitting EOB due to SOB. Pt using accessory muscles to breathe, when using incentive spirometer pt unable to get to 500, only able to get to 1st line. Elevated bed to assist with sit to stand and Janeth needed. Pt only able to tolerate 8-10ft of ambulation with RW due to gasping for air and upper back pain. Returned to sitting EOB, SpO2 89% on 2L s/p activity. ModA with LEs back to supine.   Pt is not safe for d/c home at this time. Progression toward goals:   []      Improving appropriately and progressing toward goals  []      Improving slowly and progressing toward goals  [x]      Not making progress toward goals and plan of care will be adjusted     PLAN:  Patient continues to benefit from skilled intervention to address the above impairments. Continue treatment per established plan of care. Discharge Recommendations:  [facility]on[facility] Liriano  Further Equipment Recommendations for Discharge:  rolling walker     SUBJECTIVE:   Patient stated my stomach hurts.     OBJECTIVE DATA SUMMARY:   Critical Behavior:  Neurologic State: Alert,Confused  Orientation Level: Oriented to person,Disoriented to place,Disoriented to situation,Disoriented to time  Cognition: Follows commands,Impaired decision making,Decreased attention/concentration,Poor safety awareness  Safety/Judgement: Decreased awareness of environment,Decreased awareness of need for safety,Decreased insight into deficits  Functional Mobility Training:  Bed Mobility:    Supine to Sit: Minimum assistance; Additional time  Sit to Supine: Minimum assistance; Moderate assistance  Scooting: Additional time  Transfers:  Sit to Stand: Minimum assistance  Stand to Sit: Minimum assistance  Balance:  Sitting: Intact; With support  Standing: Impaired; With support  Standing - Static: Fair  Standing - Dynamic : Fair (-)   Ambulation/Gait Training:  Distance (ft): 10 Feet (ft)  Assistive Device: Gait belt;Walker, rolling  Ambulation - Level of Assistance: Minimal assistance  Gait Abnormalities: Antalgic;Decreased step clearance; Step to gait  Right Side Weight Bearing: As tolerated  Speed/Francie: Slow;Delayed  Step Length: Right shortened;Left shortened        Pain:  Pain level pre-treatment: 5-7/10  Pain level post-treatment: 5-7/10   Pain Intervention(s): Medication (see MAR);  Rest, Ice, Repositioning   Response to intervention: Nurse notified, See doc flow    Activity Tolerance:   poor  Please refer to the flowsheet for vital signs taken during this treatment. After treatment:   [] Patient left in no apparent distress sitting up in chair  [x] Patient left in no apparent distress in bed  [x] Call bell left within reach  [x] Nursing notified  [x] Caregiver present  [] Bed alarm activated  [] SCDs applied      COMMUNICATION/EDUCATION:   [x]         Role of Physical Therapy in the acute care setting. [x]         Fall prevention education was provided and the patient/caregiver indicated understanding. [x]         Patient/family have participated as able in working toward goals and plan of care. [x]         Patient/family agree to work toward stated goals and plan of care. []         Patient understands intent and goals of therapy, but is neutral about his/her participation.   []         Patient is unable to participate in stated goals/plan of care: ongoing with therapy staff.  []         Other:        Ragini Mir, PTA   Time Calculation: 38 mins

## 2022-06-29 NOTE — PROGRESS NOTES
D/C plan: Home w/ [facility] and O2. Pt's son to transport. 56: Educated the pt's son via phone on the importance of having the concentrator that she will run out of O2 without the concentrator. He verbalized an understanding. CM on call reached out to [address] to advise them call the son back in 30 mins in order to deliver the O2 as he is currently out picking up the pt's meds. CM met w/ pt and son at bedside. Informed them that therapy has recommended SNF. Pt's son will want to bring her home and resume the private duty aide that he has coming in 5 days a week to help w/ bathing and getting the pt dressed. He also has a PT that comes in 3 days a week to work w/ her. The pt states she wants to go home as well. Discussed HH again. Also advised that [facility] orders would be for PT/OT/SN/MSW. Pt's son is in agreement. Pt requires O2. Tank delivered to the room. Bedside RN to educate on the tank. Pt's son advised to call [address] when he gets home to have [address] deliver the concentrator. Number is on the AVS and bedside RN will highlight the number. Care Management Interventions  Mode of Transport at Discharge: Other (see comment)  Transition of Care Consult (CM Consult): [address]: Yes  Support Systems: Child(grey)  The Plan for Transition of Care is Related to the Following Treatment Goals :  [facility] with physician follow up  The Patient and/or Patient Representative was Provided with a Choice of Provider and Agrees with the Discharge Plan?: Yes  Name of the Patient Representative Who was Provided with a Choice of Provider and Agrees with the Discharge Plan: family  Freedom of Choice List was Provided with Basic Dialogue that Supports the Patient's Individualized Plan of Care/Goals, Treatment Preferences and Shares the Quality Data Associated with the Providers?: Yes  Discharge Location  Patient Expects to be Discharged to[de-identified] Home with home health

## 2022-06-30 ENCOUNTER — HOME CARE VISIT (OUTPATIENT)
Dept: HOME HEALTH SERVICES | Facility: HOME HEALTH | Age: 87
End: 2022-06-30
Payer: MEDICARE

## 2022-06-30 ENCOUNTER — HOME CARE VISIT (OUTPATIENT)
Dept: SCHEDULING | Facility: HOME HEALTH | Age: 87
End: 2022-06-30
Payer: MEDICARE

## 2022-06-30 VITALS
TEMPERATURE: 97.7 F | OXYGEN SATURATION: 96 % | SYSTOLIC BLOOD PRESSURE: 130 MMHG | DIASTOLIC BLOOD PRESSURE: 80 MMHG | HEART RATE: 81 BPM | RESPIRATION RATE: 18 BRPM

## 2022-06-30 PROCEDURE — A6213 FOAM DRG >16<=48 SQ IN W/BDR: HCPCS

## 2022-06-30 PROCEDURE — G0299 HHS/HOSPICE OF RN EA 15 MIN: HCPCS

## 2022-06-30 PROCEDURE — G0151 HHCP-SERV OF PT,EA 15 MIN: HCPCS

## 2022-06-30 PROCEDURE — 400018 HH-NO PAY CLAIM PROCEDURE

## 2022-06-30 PROCEDURE — 3331090001 HH PPS REVENUE CREDIT

## 2022-06-30 PROCEDURE — 3331090002 HH PPS REVENUE DEBIT

## 2022-06-30 PROCEDURE — 400013 HH SOC

## 2022-06-30 NOTE — Clinical Note
80 y.o. female with right THR. Patient lives in a 2 story home with her son and there is a complex family history. The son she lives with is her primary caregiver. Patient is alert and oriented x 2-3. Patient is pleasant and cooperative. Patient is sleeping on the sofa and her son sleep's on the floor in case she needs anything. Patient wears depends but rarely has incontinence, according to her son. Patient has pain in her right hip/thigh area. Dressing is non-removeable and is to be changed on 7/4/22. VS wnl, lungs clear, heart rate regular, bowel sounds present x 4, med rec performed. Patient and caregiver educated on oxycodone IR to include taking with water and with food, do not crush, chew or break, do not double up on this medication and possibility of constipation.

## 2022-06-30 NOTE — Clinical Note
patient will be seen 2w1 3w2  for PT to improve overall functional mobility by graded therapeutic exercises, therapeutic activities, gait training, balance training and patient/caregiver education for safety at home.   Thank you for your referral

## 2022-07-01 ENCOUNTER — HOME CARE VISIT (OUTPATIENT)
Dept: HOME HEALTH SERVICES | Facility: HOME HEALTH | Age: 87
End: 2022-07-01
Payer: MEDICARE

## 2022-07-01 PROCEDURE — 3331090001 HH PPS REVENUE CREDIT

## 2022-07-01 PROCEDURE — 3331090002 HH PPS REVENUE DEBIT

## 2022-07-01 NOTE — HOME HEALTH
Skilled services/Home bound verification:     Skilled Reason for admission/summary of clinical condition:  Female patient who underwent R THR  by Dr. Heather Ford    This patient is homebound for the following reasons Requires considerable and taxing effort to leave the home . Caregiver: son Nohelia Chu assists with IADL's. Medications reconciled and all medications are available in the home this visit. High risk medication teaching regarding anticoagulants, hyperglycemic agents or opiod narcotics performed (specify) roxicodone for risk of overdose,    Dr. Heather Ford notified of any discrepancies/look a like medications/medication interactions no severe interaction noted . Home health supplies by type and quantity ordered/delivered this visit include: mepilex dressing     Patient education provided this visit to include: HEP. fall prevention, dc planning . Patient level of understanding of education provided: Patient was able to partially teach back HEP     Sharps Education Provided: n/a    Patient response to procedure performed:  Patient needed verbal cues for safety with gait technique using RW     Home exercise program/Homework provided: patient was educated with HEP including supine ankle oumps, ankle circles, quad sets, hamstring sets, heel slides and gluteal sets x 20 reps x 3 sets daily to improve ROM and MMT on R hip to improve gait and transfers followed by ice x 20 minutes at a time on R hip  to decrease pain and swelling. Patient educated with fall prevention technique by decluttering space, proper use of AD and footwear    Pt/Caregiver instructed on plan of care and are agreeable to plan of care at this time. Physician Dr. Heather Ford notified of patient admission to home health and plan of care including anticipated frequency of 2w1 3w2 for PT     Discharge planning discussed with patient and caregiver.   Discharge planning as follows: dc to family with MD supervision when all goals are met . Pt/Caregiver did verbalize understanding of discharge planning. Next MD appointment 2 weeks Patient/caregiver encouraged/instructed to keep appointment as lack of follow through with physician appointment could result in discontinuation of home care services for non-compliance. PMHx:Asthma      Cancer ([address]        skin cancer-melanomas    GERD (gastroesophageal reflux disease)      Hypertension      OA (osteoarthritis) 2012      Right hip, knee    Primary  localized osteoarthritis of right hip 6/23/2022    Scoliosis        of spine   S: pain on R hip  5/10 that is managed by pain medication as needed and ice x 20 minutes at a time   O:Patients Goals= Be able to go back to PLOF  Wound/Incision: location, description, drainage: intact mepilex dressing on R hip that is due to be changed on 7/4/22. Left with 2 mepilex dressing. As of 7/4/22, SN changed dressing   PLOF: Lives with son in a 1 level house with >4 steps to go to main floor  prior to surgery, she was independent with ADL's and IADL's with difficulty due to chronic pain on R hip and did not use any AD for gait   STRENGTH R knee flexor 3-/5, R hip flexor 2+/5, LLE wfl  BALANCE  Tinetti 9/28 high fall risk due to reduced strength on LLE, gait deviation and decreased efficiency of balance reactions. Patient demonstrated poor use of hip and ankle strategy during standing balance activity. Patient requires support from AD at all times for safety and stability. GAIT Patient was able to ambulate with RW x 20 feet with Js  with antalgic gait, decreased step height and stride length on RLE.  Patient was educated with heel to toe gait pattern technique to prevent LOB and falls and maintaining upright posture to prevent LOB   BED MOBILITY sleeps on the couch and needed Min A to lift RLE in and out of couch   TRANSFERS Patient needed Mod A x1 with verbal cues for safety and technique to and from bed, chair and toilet using RW  A: PT evaluation completed with the presence of son Syeda Murillo  who is the primary CG, POC established, Med rec done, HEP established  P:Home Safety eval/recommendations: Home health physical therapy initial evaluation performed. Patient demonstrates decreased strength, balance, and endurance which increases patient's overall fall risk and burden of care. Patient would benefit from home health physical therapy to improve balance, strength, and endurance which would decrease fall risk and allow patient to return to prior level of function once all functional goals or full rehab potential is met. patient was educated with HEP including supine ankle oumps, ankle circles, quad sets, hamstring sets, heel slides and gluteal sets x 20 reps x 3 sets daily to improve ROM and MMT on  RLE to improve gait and transfers followed by ice x 20 minutes at a time on R hip to decrease pain and swelling.  Patient educated with fall prevention technique by decluttering space, proper use of AD and footwear

## 2022-07-02 ENCOUNTER — HOME CARE VISIT (OUTPATIENT)
Dept: SCHEDULING | Facility: HOME HEALTH | Age: 87
End: 2022-07-02
Payer: MEDICARE

## 2022-07-02 PROCEDURE — 3331090002 HH PPS REVENUE DEBIT

## 2022-07-02 PROCEDURE — G0157 HHC PT ASSISTANT EA 15: HCPCS

## 2022-07-02 PROCEDURE — 3331090001 HH PPS REVENUE CREDIT

## 2022-07-03 ENCOUNTER — HOME CARE VISIT (OUTPATIENT)
Dept: HOME HEALTH SERVICES | Facility: HOME HEALTH | Age: 87
End: 2022-07-03
Payer: MEDICARE

## 2022-07-03 ENCOUNTER — HOME CARE VISIT (OUTPATIENT)
Dept: SCHEDULING | Facility: HOME HEALTH | Age: 87
End: 2022-07-03
Payer: MEDICARE

## 2022-07-03 VITALS
RESPIRATION RATE: 18 BRPM | TEMPERATURE: 98.3 F | HEART RATE: 87 BPM | OXYGEN SATURATION: 98 % | DIASTOLIC BLOOD PRESSURE: 60 MMHG | SYSTOLIC BLOOD PRESSURE: 102 MMHG

## 2022-07-03 PROCEDURE — 3331090002 HH PPS REVENUE DEBIT

## 2022-07-03 PROCEDURE — 3331090001 HH PPS REVENUE CREDIT

## 2022-07-03 PROCEDURE — G0157 HHC PT ASSISTANT EA 15: HCPCS

## 2022-07-04 ENCOUNTER — HOME CARE VISIT (OUTPATIENT)
Dept: SCHEDULING | Facility: HOME HEALTH | Age: 87
End: 2022-07-04
Payer: MEDICARE

## 2022-07-04 VITALS
DIASTOLIC BLOOD PRESSURE: 64 MMHG | SYSTOLIC BLOOD PRESSURE: 105 MMHG | OXYGEN SATURATION: 95 % | HEART RATE: 91 BPM | RESPIRATION RATE: 16 BRPM | TEMPERATURE: 97.7 F

## 2022-07-04 VITALS
SYSTOLIC BLOOD PRESSURE: 122 MMHG | HEART RATE: 80 BPM | OXYGEN SATURATION: 96 % | DIASTOLIC BLOOD PRESSURE: 62 MMHG | TEMPERATURE: 98.2 F

## 2022-07-04 PROCEDURE — G0495 RN CARE TRAIN/EDU IN HH: HCPCS

## 2022-07-04 PROCEDURE — 3331090002 HH PPS REVENUE DEBIT

## 2022-07-04 PROCEDURE — 3331090001 HH PPS REVENUE CREDIT

## 2022-07-04 NOTE — HOME HEALTH
SUBJECTIVE: My legs are swollen  CAREGIVER INVOLVEMENT/ASSISTANCE NEEDED FOR: Ele Darling, son, assist with cooking. HOME HEALTH SUPPLIES BY TYPE AND QUANTITY ORDERED/DELIVERED THIS VISIT INCLUDE: none  OBJECTIVE:  See interventions. ASSESSMENT OF PROGRESS TOWARD GOALS: Pt gloria ther-ex fairly, but arthritis in knee limiting patient participation and ROM.   PATIENT RESPONSE TO TREATMENT:  Pain increased with ther-ex but resolved after rest.  PATIENT LEVEL OF UNDERSTANDING OF EDUCATION PROVIDED: Pt educated on HEP to perform daily  CONTINUED NEED FOR THE FOLLOWING SKILLS: CONT PT to address MMT, ROM, gait training, bed mob, transfers, stair training and other goals  PLAN FOR NEXT VISIT: Progress with gait training and transfers  Samina THE PATIENT/CAREGIVER:  CONT PT 3wk1, 1wk1

## 2022-07-04 NOTE — CASE COMMUNICATION
At today's SN visit, the patient's son was yelling at the pt for falling asleep while I was there. [facility] and also a private duty PT coming see her daily, sometimes twice a day. The private duty PT had already been there today and the pt, who is 80, just had surgery and is exhausted. Concerns for the son pushing her so hard and not even letting her rest. The dynamic is very strange with the son. When I tried to [address] the pt needed to rest, the son told me to be quiet.

## 2022-07-04 NOTE — HOME HEALTH
Skilled services/Home bound verification:     Skilled Reason for admission/summary of clinical condition:  right THR  80 y.o. female with right THR. Patient lives in a 2 story home with her son and there is a complex family history. The son she lives with is her primary caregiver. Patient is alert and oriented x 2-3. Patient is pleasant and cooperative. Patient is sleeping on the sofa and her son sleep's on the floor in case she needs anything. Patient wears depends but rarely has incontinence, according to her son. Patient has pain in her right hip/thigh area. Dressing is non-removeable and is to be changed on 7/4/22. VS wnl, lungs clear, heart rate regular, bowel sounds present x 4, med rec performed. Patient and caregiver educated on oxycodone IR to include taking with water and with food, do not crush, chew or break, do not double up on this medication and possibility of constipation. This patient is homebound for the following reasons Requires considerable and taxing effort to leave the home , Requires the assistance of 1 or more persons to leave the home , Only leaves the home for medical reasons or [facility] services and are infrequent and of short duration for other reasons  and Decreased mental status requiring 24 hour supervision . Caregiver: relative. Caregiver assists with all IADL's and ADL's. Medications reconciled and all medications are available in the home this visit. The following education was provided regarding medications: all medications reviewed and educated on to include: name, dose, route, frequency, side effects and effectiveness  Medications  are too early to tell if effective at this time.       High risk medication teaching regarding anticoagulants, hyperglycemic agents or opiod narcotics performed (specify) oxycodone IR    Dr. Chrys Cranker notified of any discrepancies/look a like medications/medication interactions - none noted     Home health supplies by type and quantity ordered/delivered this visit include: n/a    Patient education provided this visit to include: see interventions    Patient level of understanding of education provided: see interventions    Sharps Education Provided: n/a  Patient response to procedure performed:  tolerated assessment well    Home exercise program/Homework provided: Report any s/sx of abnormal bleeding to MD immediately/seek medical treatment for any: black tary stools, dark/tea/blood, hemtoma, dizziness, frequent gum/nose bleeds, unusal brusing, or prolong bleeding. CONT ALL MEDICATIONS AS PRESCRIBED, DIET AS PRESCRIBED, IMPLEMENT FALL/INFECTION CONTROL/PRESSURE ULCER INTERVENTIONS,MONITOR FOR ABNORMAL S/SXREPORT TO MD IMMEDIATELY. KEEP ALL FOLLOW UP APPTS AS PRESCRIBED. READ ALL TEACHING PACKETS FOR EDUCATION OF DISEASE PROCESS & TO PREVENT COMPLICATIONS. Pt/Caregiver instructed on plan of care and are agreeable to plan of care at this time. Physician Dr. Paul Mera notified of patient admission to home health and plan of care including anticipated frequency of SN 1w4, 2prn and treatments/interventions/modalities of right hip wound care, s/p right THR, med management, pain management. Discharge planning discussed with patient and caregiver. Discharge planning as follows: discharge when all goals met. Pt/Caregiver did verbalize understanding of discharge planning. Next MD appointment TBD (date) with MD/NP/PA. Patient/caregiver encouraged/instructed to keep appointment as lack of follow through with physician appointment could result in discontinuation of home care services for non-compliance.

## 2022-07-04 NOTE — Clinical Note
Thanks for your insight. He was being more than just loud with her, he was rude to her and to me. He told me she does not need to rest, she needs to be up, moving. The PT was there today working on strengthening her and her walking. She was plain tired. ----- Message -----  From: Eliz Buck RN  Sent: 7/4/2022   6:30 PM EDT  To: Vivienne Vee RN  Subject: RE:                                                Alen Handsome,  In general, there is a complex family dynamic. According to patient and son the daughter lives across the street and has a camera outside videotaping the comings and Lonell Hue of their house. The patient can't always hear so the son does speak loud to her. The reason he \"yells\" at her to wake up is because she tends to lean to her left side in that recliner and he is afraid she will hyperextend her right hip where the recent surgery is. Which she noted at my admit she does do that. I encouraged her to lay down to nap if she is tired versus leaning to one side or the other in her chair. At the visit I was under the impression that the private PT was coming to do more stretching and massage vs hard core therapy exercises or at least that is what was verbalized. Maybe PT could talk with patient and son to see if they still need our services. Nursing should be out of there soon. Thanks,  Jose Truong  ----- Message -----  From: Ron Donis RN  Sent: 7/4/2022   2:41 PM EDT  To: Keaton Villavicencio RN      At today's SN visit, the patient's son was yelling at the pt for falling asleep while I was there. The patient is having [facility] PT and also a private duty PT coming see her daily, sometimes twice a day. The private duty PT had already been there today and the pt, who is 80, just had surgery and is exhausted. Concerns for the son pushing her so hard and not even letting her rest. The dynamic is very strange with the son. When I tried to explain the pt needed to rest, the son told me to be quiet.

## 2022-07-04 NOTE — CASE COMMUNICATION
Patient's son has a private duty PT seeing the patient daily, sometimes twice a day, along with home health PT. [facility] the son is paying out of pocket for this.  Case communication with Dr Damian Jansen

## 2022-07-04 NOTE — Clinical Note
I am sorry you and her had to experience that. He was not like that when I was there. But I sometimes think they are on their best behavior during first visits trying to portray a good first impression.  ----- Message -----  From: Heidi Dinh RN  Sent: 7/4/2022  12:00 AM EDT  To: Ivone Ramos RN  Subject: RE:                                                Thanks for your insight. He was being more than just loud with her, he was rude to her and to me. He told me she does not need to rest, she needs to be up, moving. The PT was there today working on strengthening her and her walking. She was plain tired. ----- Message -----  From: Cheri Monroy RN  Sent: 7/4/2022   6:30 PM EDT  To: Samantha Cisneros RN  Subject: RE:                                                Geoff Chew,  In general, there is a complex family dynamic. According to patient and son the daughter lives across the street and has a camera outside videotaping the comings and Elsa American of their house. The patient can't always hear so the son does speak loud to her. The reason he \"yells\" at her to wake up is because she tends to lean to her left side in that recliner and he is afraid she will hyperextend her right hip where the recent surgery is. Which she noted at my admit she does do that. I encouraged her to lay down to nap if she is tired versus leaning to one side or the other in her chair. At the visit I was under the impression that the private PT was coming to do more stretching and massage vs hard core therapy exercises or at least that is what was verbalized. Maybe PT could talk with patient and son to see if they still need our services. Nursing should be out of there soon. Thanks,  Margarita Dumont  ----- Message -----  From: Heidi Dinh RN  Sent: 7/4/2022   2:41 PM EDT  To: Ivone Ramos RN      At today's SN visit, the patient's son was yelling at the pt for falling asleep while I was there.  [facility] and also a private duty PT coming see her daily, sometimes twice a day. The private duty PT had already been there today and the pt, who is 80, just had surgery and is exhausted. Concerns for the son pushing her so hard and not even letting her rest. The dynamic is very strange with the son. When I tried to explain the pt needed to rest, the son told me to be quiet.

## 2022-07-04 NOTE — HOME HEALTH
SUBJECTIVE: I did some walking yesterday, but I am tired today  CAREGIVER INVOLVEMENT/ASSISTANCE NEEDED FOR: Waqas Tse, son, assist with [facility] ORDERED/DELIVERED THIS VISIT INCLUDE: none  OBJECTIVE:  See interventions. ASSESSMENT OF PROGRESS TOWARD GOALS: Pt not progressing well, does not want to perform ROM with RLE, protective of RLE. PATIENT RESPONSE TO TREATMENT:  Pain increased with exercises, transfers and ambulation. Complained of back pain with amb also. PATIENT LEVEL OF UNDERSTANDING OF EDUCATION PROVIDED: Pt educated on need to ice and elevation, patient still not icing after being told to ice yesteday.   CONTINUED NEED FOR THE FOLLOWING SKILLS: CONT PT to address MMT, ROM, gait training, bed mob, transfers, stair training and other goals  PLAN FOR NEXT VISIT: Progress with strengthening and   THE FOLLOWING DISCHARGE PLANNING WAS DISCUSSED WITH THE PATIENT/CAREGIVER:  CONT PT 2wk1, 1wk1

## 2022-07-04 NOTE — CASE COMMUNICATION
Patient has a private duty PT also seeing the patient daily, it is not through a [facility] agency, they are paying out of pocket for this.  Case communication with PT and SN

## 2022-07-04 NOTE — Clinical Note
Deepali Lux,  In general, there is a complex family dynamic. According to patient and son the daughter lives across the street and has a camera outside videotaping the comings and Lansandra Rosarioy of their house. The patient can't always hear so the son does speak loud to her. The reason he \"yells\" at her to wake up is because she tends to lean to her left side in that recliner and he is afraid she will hyperextend her right hip where the recent surgery is. Which she noted at my admit she does do that. I encouraged her to lay down to nap if she is tired versus leaning to one side or the other in her chair. At the visit I was under the impression that the private PT was coming to do more stretching and massage vs hard core therapy exercises or at least that is what was verbalized. Maybe PT could talk with patient and son to see if they still need our services. Nursing should be out of there soon. Thanks,  Leigh Ann Mcgregor  ----- Message -----  From: Sadie Jenkins RN  Sent: 7/4/2022   2:41 PM EDT  To: Amanda Trejo RN      At today's SN visit, the patient's son was yelling at the pt for falling asleep while I was there. [facility] and also a private duty PT coming see her daily, sometimes twice a day. The private duty PT had already been there today and the pt, who is 80, just had surgery and is exhausted. Concerns for the son pushing her so hard and not even letting her rest. The dynamic is very strange with the son. When I tried to explain the pt needed to rest, the son told me to be quiet.

## 2022-07-05 ENCOUNTER — HOME CARE VISIT (OUTPATIENT)
Dept: HOME HEALTH SERVICES | Facility: HOME HEALTH | Age: 87
End: 2022-07-05
Payer: MEDICARE

## 2022-07-05 ENCOUNTER — HOME CARE VISIT (OUTPATIENT)
Dept: SCHEDULING | Facility: HOME HEALTH | Age: 87
End: 2022-07-05
Payer: MEDICARE

## 2022-07-05 VITALS
DIASTOLIC BLOOD PRESSURE: 68 MMHG | HEART RATE: 86 BPM | SYSTOLIC BLOOD PRESSURE: 110 MMHG | OXYGEN SATURATION: 98 % | RESPIRATION RATE: 14 BRPM | TEMPERATURE: 98.4 F

## 2022-07-05 PROCEDURE — G0157 HHC PT ASSISTANT EA 15: HCPCS

## 2022-07-05 PROCEDURE — 3331090001 HH PPS REVENUE CREDIT

## 2022-07-05 PROCEDURE — 3331090002 HH PPS REVENUE DEBIT

## 2022-07-05 NOTE — HOME HEALTH
Skilled reason for visit: Patient was recently hospitalized for R THR, requiring observation by a SN for s/s of decomposition or adverse effects resulting from newly prescribed medications. Skilled observation needed to determine if new medication regimen prescribed requires modifications or other therapeutic interventions considered until pt's clinical condition or treatment has stabilized. Caregiver involvement:  Patient's caregiver is her son. Caregiver assists patient with bathing, dressing, walking, bathroom, meal prep and setup, medication management, grocery shopping, household chores, transportation to MD appointment and home exercise program.  Medications reconciled and all medications are available in the home this visit. The following education was provided regarding medications, medication interactions, and look alike modifications. oxyCODONE IR (ROXICODONE) 5 mg immediate release tablet       Contact Agency or MD with questions. Medications are effective at this time. Patient states understanding. Patient education provided this visit:  [facility] Disease Management: R THR teaching, pain management, constipation prevention, fall precautions, s/s of infection, deep breathing exercises. ..X... Wound Care:   Keep wound clean, covered and dry, s/s of infection, elevate and ice for 20 minutes in one location, then move to a new location, moving every 20 minutes. Patient is to   . Adelia Pierce...  Other:  safety and fall precautions, importance of repositioning self to reduce pressure points, Colt's sign and importance of wearing LEONARD hose to reduce edema and prevent DVT's  Patient level of understanding of education provided: Pt verbalized understanding of all education and repeated back teaching   Skilled Care Performed this visit: Disease process teaching, medication teaching, physical assessment and monitoring  Patient response to procedure performed:  Pt verbalized satisfaction with wound care  Sharps Education Provided: NA  Goals/teaching progressing. Patient's goal is to reduced pain. Progressing toward goals. Patient has remained free from falls, free from infection; no safety concerns at this time and is ambulating independently with  . SN to complete education of patient and patient to follow up with any further questions or concerns with Dr   Home exercise program: PT  Continued need for the following skills: Nursing, PT   Patient and/or caregiver notified and agree to changes in the Plan of Care: YES - Patient notified of SN discharge on next SN visit. The following discharge planning was discussed with the pt/caregiver:  SN to continue education of patient and discharge patient when teaching and goals are met. At today's SN visit, the patient's son was yelling at the pt for falling asleep while I was there. The patient is having [facility] PT and also a private duty PT coming see her daily, sometimes twice a day. The private duty PT had already been there today and the pt, who is 80, just had surgery and is exhausted. Concerns for the son pushing her so hard and not even letting her rest. The dynamic is very strange with the son. When I tried to explain the pt needed to rest, the son told me to be quiet.  Case communication with Dr [place], PT, OT

## 2022-07-06 ENCOUNTER — HOME CARE VISIT (OUTPATIENT)
Dept: SCHEDULING | Facility: HOME HEALTH | Age: 87
End: 2022-07-06
Payer: MEDICARE

## 2022-07-06 PROCEDURE — G0155 HHCP-SVS OF CSW,EA 15 MIN: HCPCS

## 2022-07-06 PROCEDURE — 3331090002 HH PPS REVENUE DEBIT

## 2022-07-06 PROCEDURE — 3331090001 HH PPS REVENUE CREDIT

## 2022-07-06 NOTE — HOME HEALTH
Subjective: Patient relays that she is not always sure how she feels but that at some times she has no to minimal hip pain. Her caregiver son is present and relays that they have additional help coming in during the day to help with ADLs such as bathing. Objective: Skilled home health physical therapy interventions completed today listed in care plan section. Interventions performed today to assist in return to prior level of function, address deficits as apparent upon time of initial evaluation, return to community and personal activities, and progress further towards goals as previously established in plan of care. There are not any changes to medications upon timing of this visit. Home health supplies were not ordered/delivered today. Lower extremity edema noted lateral right hip grade II. Trained caregiver in fall prevention encouraging use of improved footwear with better  to ensure traction during transfers and gait attempts. Assessment:  Patient is progressing towards goals as previously established in [address] with skilled home health physical therapy services at this time as made apparent by ability to complete long arc quads with no reports of hip pain after completing other exercises. Hip weakness continues. Patient was challenged by attempting to stand with toes forwards and feet apart but was able to do so for two attempts. Family/caregiver son and private health aide involvement is present/set-up at this point in time and assists with meals, transport ADLs, transfers, and general encouragement. Plan:  Discharge planning discussed at this visit regarding eventual discharge once patient has met goals and/or met maximum benefit from home health skilled PT services with patient understanding and agreeable at this time. Continued need for skilled home health PT at this time to address deficits, reduce risk of falls, and obtain goals as previously established per plan of care.  Further gait distance as tolerated and possibly attempt stair navigation.

## 2022-07-07 ENCOUNTER — HOME CARE VISIT (OUTPATIENT)
Dept: SCHEDULING | Facility: HOME HEALTH | Age: 87
End: 2022-07-07
Payer: MEDICARE

## 2022-07-07 VITALS
RESPIRATION RATE: 14 BRPM | TEMPERATURE: 97.8 F | SYSTOLIC BLOOD PRESSURE: 112 MMHG | DIASTOLIC BLOOD PRESSURE: 65 MMHG | HEART RATE: 86 BPM | OXYGEN SATURATION: 98 %

## 2022-07-07 PROCEDURE — 3331090002 HH PPS REVENUE DEBIT

## 2022-07-07 PROCEDURE — G0157 HHC PT ASSISTANT EA 15: HCPCS

## 2022-07-07 PROCEDURE — 3331090001 HH PPS REVENUE CREDIT

## 2022-07-07 NOTE — HOME HEALTH
MSW met with the pt and her son/POA Deyanira Dixon, who provided most of the information for the assessment. MSW provided the pt/POA documentation of social service resources, 211 (resource hotline), [place] [employer], and support programs. MSW also provided information about care assistance options/costs/funding sources and invited a return call if resource questions arise.

## 2022-07-08 ENCOUNTER — HOME CARE VISIT (OUTPATIENT)
Dept: SCHEDULING | Facility: HOME HEALTH | Age: 87
End: 2022-07-08
Payer: MEDICARE

## 2022-07-08 PROCEDURE — G0152 HHCP-SERV OF OT,EA 15 MIN: HCPCS

## 2022-07-08 PROCEDURE — 3331090001 HH PPS REVENUE CREDIT

## 2022-07-08 PROCEDURE — 3331090002 HH PPS REVENUE DEBIT

## 2022-07-08 NOTE — DISCHARGE SUMMARY
[address] [facility] [address]     DISCHARGE SUMMARY     PATIENT: Porfirio Moreno     MRN: [id number]   ADMIT DATE: 2022   [place]LL[place]   DISCHARGE DATE: 2022     ATTENDING: Bisi Brock MD   DICTATING: JALEEL Pandey     ADMISSION DIAGNOSIS: Primary localized osteoarthritis of right hip [M16.11]    DISCHARGE DIAGNOSIS: Status post RIGHT TOTAL HIP ARTHROPLASTY    HISTORY OF PRESENT ILLNESS: The patient is a 80y.o. year-old female   with ongoing right hip pain secondary to osteoarthritis of right hip. The patient's pain has persisted and progressed despite conservative treatments and therapies. The patient has at this time opted for surgical intervention. PAST MEDICAL HISTORY:   Past Medical History:   Diagnosis Date    Asthma     Cancer ([address]     skin cancer-melanomas    GERD (gastroesophageal reflux disease)     Hypertension     OA (osteoarthritis) 2012    Right hip, knee    Primary localized osteoarthritis of right hip 2022    Scoliosis     of spine       PAST SURGICAL HISTORY:   Past Surgical History:   Procedure Laterality Date    HX GI      upper endoscopy to repair bleeding ulcer    HX GI      Colonoscopy-Dr. Lott Hatchet    HX HEENT Bilateral 2002    Lasic surgery    HX HEENT      Molar extraction by oral surgeon    HX ORTHOPAEDIC Right 2019    leg surg with hardware after fall-Dr. Jaymie Nicole HX OTHER SURGICAL      multiple skin cancer removal       ALLERGIES:   Allergies   Allergen Reactions    Ace Inhibitors Unknown (comments)     Patient and son unaware of the reaction.  Terramycin [Oxytetracycline] Rash        CURRENT MEDICATIONS:  A list of medications prior to the time of admission include:  Prior to Admission medications    Medication Sig Start Date End Date Taking? Authorizing Provider   aspirin 81 mg chewable tablet Take 1 Tablet by mouth two (2) times a day for 21 days.  22 Yes Gerard Javier PA   meloxicam (MOBIC) 7.5 mg tablet Take 1 Tablet by mouth two (2) times a day for 14 days. 6/27/22 7/11/22 Yes Gerard Javier PA   SUCRALFATE PO Take 15 mL by mouth daily. 1mg/10ml   Indications: for ulcers   Yes Provider, Historical   potassium chloride (KAON 10%) 20 mEq/15 mL solution Take 20 mEq by mouth daily. Indications: prevention of low potassium in the blood   Yes Provider, Historical   furosemide (Lasix) 20 mg tablet Take 20 mg by mouth two (2) times a day. Indications: high blood pressure   Yes Provider, Historical   ferrous sulfate (Iron) 325 mg (65 mg iron) tablet Take 325 mg by mouth Daily (before breakfast). Yes Provider, Historical   calcium-vit D3-mag gly-zinc 400 mg-83.3 mcg -166.7 mg cap Take 1 Tablet by mouth two (2) times a day. Provider, Historical   OXYGEN-AIR DELIVERY SYSTEMS 3 L by IntraNASal route continuous. Provider, Historical   b complex-vitamin c-folic acid (NEPHROCAPS) 1 mg capsule Take 1 Capsule by mouth daily. Provider, Historical   albuterol (PROVENTIL HFA, VENTOLIN HFA, PROAIR HFA) 90 mcg/actuation inhaler Take 2 Puffs by inhalation every four (4) hours as needed for Wheezing or Shortness of Breath. Provider, Historical       FAMILY HISTORY: History reviewed. No pertinent family history. SOCIAL HISTORY:   Social History     Socioeconomic History    Marital status:    Tobacco Use    Smoking status: Never Smoker    Smokeless tobacco: Never Used   Vaping Use    Vaping Use: Never used   Substance and Sexual Activity    Alcohol use: Not Currently    Drug use: Never       REVIEW OF SYSTEMS: All review of systems are negative. PHYSICAL EXAMINATION: For a detailed physical exam, please refer to the patient's chart. HOSPITAL COURSE: The patient was taken to surgery the day of admission. she underwent right total hip replacement via the anterior approach. Operative course was benign. Estimated blood loss approximately 300 cc.  The patient was taken to the PACU in stable condition and was later taken to the floor in stable condition. Post-op Day #1, patient has done very well.  she has had little to no pain. she had been cleared by physical therapy with stair training. she was placed on Aspirin for DVT prophylaxis. her vitals have remained stable. she has also remained hemodynamically stable. The patient has been recommended for discharge home. DISCHARGE INSTRUCTIONS: The patient is to be discharged home. she is to continue on her prior medications per the medication reconciliation form, to which we will add:         1)  Aspirin 81mg; 1 tablet p.o. b.i.d. X 21 days         2)  Mobic 7.5 mg; 1 tablet p.o. BID x 14 days           3)  Roxicodone 5 mg; 1 tablets p.o. every 4 hours p.r.n. for pain         4)  Duricef 500 mg; 1 tablet p.o. every 12 hours x 5 days    The patient is to continue at home with home physical therapy 3 times a week to work on gait training, range of motion, strengthening, and weightbearing exercises as tolerated on her right lower extremity. The patient is to progress from a walker to a cane to complete total weightbearing as tolerable. The patient is to continue to keep her incision dry. The patient is to followup with Dr. Lauren Fontenot, Buddy Banuelos PA-C, and/or Kieran Corcoran PA-C in the office approximately 10-14 days status post for x-rays and further evaluation.       Enrique Potts 1723, [address]  6/6/[id number]:48 PM

## 2022-07-08 NOTE — Clinical Note
ASSESSMENT:   Pt has significant dementia and has needed assistance for dressing, bathing, grooming, and all transfers for several years. She continues to need Max assist for all ADLs except feeding and toiletting. Pt has restricted shoulder flexion to 90 degrees. Pt's son reports pt's function has not changed  in the past year. Pt's son and the pt were unable to come up with anything except walking better as a goal.  Pt is functioning at her PLF and has poor rehab potential to improve functional tasks. Pt's son agrees with OT discharge. Pt's doctor has been notified.

## 2022-07-08 NOTE — HOME HEALTH
Subjective: Patient states that she has had more back pain than hip pain. Patient's caregiver son relays that patient was combative yesterday and they had difficulty encouraging her to take her medication and be active with exercises as well as regular ambulation. Objective: Skilled home health physical therapy interventions completed today listed in care plan section. Interventions performed today to assist in return to prior level of function, address deficits as apparent upon time of initial evaluation, return to community and personal activities, and progress further towards goals as previously established in plan of care. There are not any changes to medications upon timing of this visit. Home health supplies were not ordered/delivered today. Encouraged regular transfers possibly at meal times to chair at dining room table instead of patient taking meals in recliner to improve overall mobility and activity tolerance. Assessment:  Patient is slowly progressing towards goals as previously established in POC with skilled home health physical therapy services at this time as made apparent by improving activity tolerance. Oxygen saturation stayed above 98% with activity today. Was able to transfer to/from dinning room table arm chair with stand by assist for safety needed. Caregiver relayed understanding of need for better traction footwear to reduce overall fall risk . Family/caregiver son and private home health aide involvement is present/set-up at this point in time and assists with meals, transport, ADLs, transfers, and general encouragement. Plan:  Discharge planning discussed at this visit regarding eventual discharge once patient has met goals and/or met maximum benefit from home health skilled PT services with patient understanding and agreeable at this time.  Continued need for skilled home health PT at this time to address deficits, reduce risk of falls, and obtain goals as previously established per plan of care. Continue with gait training and attempt additional standing exercises as tolerated. Patient has follow-up with surgeon on Monday.

## 2022-07-09 ENCOUNTER — HOME CARE VISIT (OUTPATIENT)
Dept: SCHEDULING | Facility: HOME HEALTH | Age: 87
End: 2022-07-09
Payer: MEDICARE

## 2022-07-09 VITALS
TEMPERATURE: 97.8 F | RESPIRATION RATE: 15 BRPM | DIASTOLIC BLOOD PRESSURE: 68 MMHG | SYSTOLIC BLOOD PRESSURE: 118 MMHG | HEART RATE: 86 BPM | OXYGEN SATURATION: 95 %

## 2022-07-09 PROCEDURE — G0299 HHS/HOSPICE OF RN EA 15 MIN: HCPCS

## 2022-07-09 PROCEDURE — 3331090001 HH PPS REVENUE CREDIT

## 2022-07-09 PROCEDURE — 3331090002 HH PPS REVENUE DEBIT

## 2022-07-09 NOTE — HOME HEALTH
Summary of clinical condition:  Pt had long term pain in her right hip secondary to osteoporosis. She did not improve with conservative treatment and opted for surgical repair. She had a THR on 6/27/2022. She recovered slowly and was kept in the hospital until 6/29/2022. She was discharged home [facility] supplemental O2 [facility]. Medical History:   Right ankle Fx, Asthma      Cancer ([address]        skin cancer-melanomas    GERD (gastroesophageal reflux disease)      Hypertension      OA (osteoarthritis) 2012      Right hip, knee    Primary  localized osteoarthritis of right hip 6/23/2022    Scoliosis        of spine   Medications review completed. No adverse reactions noted. Pt's son denied any changes since last SN visit. Caregiver involvement: Pt's son assists with toiletting and IADLs. She has personal care aide who assists with dressing, bathing, and general safety supervision. Patient education provided this visit:  I explained the difference between OT and PT in the home. Pt was unable to express understanding but her son was able to express the difference. Home exercise program:  None needed      ASSESSMENT:   Pt has significant dementia and has needed assistance for dressing, bathing, grooming, and all transfers for several years. She continues to need Max assist for all ADLs except feeding and toiletting. Pt has restricted shoulder flexion to 90 degrees. Pt's son reports pt's function has not changed  in the past year. Pt's son and the pt were unable to come up with anything except walking better as a goal.  Pt is functioning at her PLF and has poor rehab potential to improve functional tasks. Pt's son agrees with OT discharge. Pt's doctor has been notified.

## 2022-07-09 NOTE — Clinical Note
Patient was seen for right THR requiring dressing changes and incisional care. Pt remaining stable during [facility] services. Pt has PT therapy services. Dressing changes to right hip today with no s/s of infection noted. Pt is meeting all nursing goals and is ready for discipline DC. Pt/cg aware of disease process, s/s to monitor for, who to report to/when. Patient/cg is able to verbalize understanding of all medications at this time: side effects, purposes, dosages, frequencies. SN reviewed in detail all medications with patient/cg. No issues noted at discharge. Patient is aware to follow up with PCP and other MD's. Management aware of DC and approved. Please call #[phone] with any questions. Thank you!   Petra Daley, RN, BSN

## 2022-07-10 PROCEDURE — 3331090002 HH PPS REVENUE DEBIT

## 2022-07-10 PROCEDURE — 3331090001 HH PPS REVENUE CREDIT

## 2022-07-11 VITALS
SYSTOLIC BLOOD PRESSURE: 104 MMHG | RESPIRATION RATE: 18 BRPM | OXYGEN SATURATION: 99 % | HEART RATE: 79 BPM | DIASTOLIC BLOOD PRESSURE: 62 MMHG | TEMPERATURE: 98.8 F

## 2022-07-11 PROCEDURE — 3331090002 HH PPS REVENUE DEBIT

## 2022-07-11 PROCEDURE — 3331090001 HH PPS REVENUE CREDIT

## 2022-07-12 ENCOUNTER — HOME CARE VISIT (OUTPATIENT)
Dept: SCHEDULING | Facility: HOME HEALTH | Age: 87
End: 2022-07-12
Payer: MEDICARE

## 2022-07-12 PROCEDURE — G0157 HHC PT ASSISTANT EA 15: HCPCS

## 2022-07-12 PROCEDURE — 3331090002 HH PPS REVENUE DEBIT

## 2022-07-12 PROCEDURE — 3331090001 HH PPS REVENUE CREDIT

## 2022-07-12 NOTE — HOME HEALTH
Skilled reason for visit: right THR requiring dressing changes. Caregiver involvement: Patient's cg is her son. cg lives with patient and is available as needed for assistance with iadls, adls, meal prep, medication management, taking to md appointments. Medications reviewed and all medications are available in the home this visit. No reported medication changes on this visit. The following education was provided regarding medications, medication interactions, and look a like medications: reviewed side effects, purposes, dosage, frequencies. Medications  are effective at this time. Home health supplies by type and quantity ordered/delivered this visit include: dressings ordered on admission. Patient education provided this visit: patient/cg aware to monitor for edema/increase in edema, to elevate extremity when edema occurs and to notify md if edema exceeds normal limits for patient. pt aware to keep dressing clean, dry and intact as ordered. dressing to right hip changed today- incision healing well with no s/s of infection. patient aware to monitor for s/s of infection [increased swelling, increased redness around site, increased pain, foul smelling drainage, fever] aware who to report to/when. Instructed patient/caregiver on heart healthy diet-low cholesterol, low fat and low salt.  Patient notified to restrict sodium intake to 2 g/day, instructed patient/caregiver to reduce sodium if weight begins to increase, on tips to limit sodium (avoiding foods high in sodium, getting rid of salt shaker, using herbs/spices, using fresh foods, avoiding salt substitutes which may contain potassium, buying foods labeled low-sodium or sodium free), reading food labels, and making changes slowly to give taste buds time to adjust. Instructed patient to reduce intake of saturated and fatty acids (butter, creams, red meats, fried foods, margarines, high fat baked goods, shortening, cheeses, etc) and to increase daily fresh fruits and vegetables and whole grains, encouraged to avoid canned and processed foods as much as possible. patient instructed to monitor daily weight and to report weight gain of 2lbs overnight/5lbs in 1 week to MD. pt/cg encouraged to perform weight check first thing in the morning after first urination, around the same time each day for accuracy. SN reviewed stop light tool for CHF management, left with patient. pt aware to follow a high protein diet for healing- to try to get 90g protein daily. discussed fall precautions in detail- having lighted hallways, removing throw rugs, monitoring medication that may alter mental status. patient made aware to turn a minimum of every 2 hours and to keep pressure off of bony prominences, to monitor for any pressure ulcer development/worsening. Sharps education provided: na  Patient level of understanding of education provided: patient has a fair understanding of education that was provided at this time by engaging in all education provided and is able to verbalize understanding, pt denies any questions or concerns at this time. cg had good understanding. Skilled Care Performed this visit:  full body assessment and education as above. Patient response to procedure performed:   patient tolerated procedure with no signs of discomfort, grimacing or pain, no complications or concerns noted. Progress toward goals: Patient was seen for right THR requiring dressing changes and incisional care. [facility]. Pt has PT therapy services. Dressing changes to right hip today with no s/s of infection noted. Pt is meeting all nursing goals and is ready for discipline DC. Pt/cg aware of disease process, s/s to monitor for, who to report to/when. Patient/cg is able to verbalize understanding of all medications at this time: side effects, purposes, dosages, frequencies. SN reviewed in detail all medications with patient/cg.  No issues noted at discharge. Patient is aware to follow up with PCP and other MD's. Management aware of DC and approved. Home exercise program: patient instructed to perform sob hep 4-5 x daily and prn for sob, to promote lung expansion. pt also encouraged to use ICS q 2 hours and to perform sob hep during therapy. Continued need for the following skills:  Physical Therapy    The following discharge planning was discussed with the pt/caregiver: Patient is meeting all SN goals at this time and is ready for discipline discharge to other therapies. Patient is aware to follow up with PCP and to continue with other therapies. Opportunity for questions provided- none at this time.  Patient aware to refer any questions after DC to therapy/MD.

## 2022-07-13 PROCEDURE — 3331090002 HH PPS REVENUE DEBIT

## 2022-07-13 PROCEDURE — 3331090001 HH PPS REVENUE CREDIT

## 2022-07-14 ENCOUNTER — HOME CARE VISIT (OUTPATIENT)
Dept: SCHEDULING | Facility: HOME HEALTH | Age: 87
End: 2022-07-14
Payer: MEDICARE

## 2022-07-14 PROCEDURE — 3331090002 HH PPS REVENUE DEBIT

## 2022-07-14 PROCEDURE — G0157 HHC PT ASSISTANT EA 15: HCPCS

## 2022-07-14 PROCEDURE — 3331090001 HH PPS REVENUE CREDIT

## 2022-07-15 ENCOUNTER — HOME CARE VISIT (OUTPATIENT)
Dept: HOME HEALTH SERVICES | Facility: HOME HEALTH | Age: 87
End: 2022-07-15
Payer: MEDICARE

## 2022-07-15 VITALS
DIASTOLIC BLOOD PRESSURE: 62 MMHG | RESPIRATION RATE: 18 BRPM | TEMPERATURE: 98 F | OXYGEN SATURATION: 94 % | SYSTOLIC BLOOD PRESSURE: 110 MMHG | HEART RATE: 72 BPM

## 2022-07-15 PROCEDURE — 3331090002 HH PPS REVENUE DEBIT

## 2022-07-15 PROCEDURE — 3331090001 HH PPS REVENUE CREDIT

## 2022-07-15 NOTE — HOME HEALTH
SUBJECTIVE: Son reports his normal helpers are either on vacation or sick. Patient's son reports patient has already been seen by Dr Shukri Saucedo and will be starting outpatient therapy if they decide on this. CAREGIVER INVOLVEMENT/ASSISTANCE NEEDED FOR: Patient son is offering assist for all care as needed this week. He reports his mother does not follow HEP when he tries to get her to amb or to do it. HOME HEALTH SUPPLIES BY TYPE AND QUANTITY ORDERED/DELIVERED THIS VISIT INCLUDE:  None  OBJECTIVE:  See interventions. PATIENT LEVEL OF UNDERSTANDING OF EDUCATION PROVIDED: Good-patient and son   ASSESSMENT OF PROGRESS TOWARD GOALS: Upon arrival patient was not wearing nasal cannula and O2 SAT was 84% it took several minutes on 1 L min to raise to 94% requested to sn that O2 be put on at least 2 L per min. Feel patient should be wearing O2 espeically with any exerting activity-gait, transfer,exercises. Working to increase B foot cearance by utilizing increasing B heel strike patterning and upright posturing to increase balance during gait. Trialed to step up onto step -patient too fearful however at kitchen counter patient did agree to step up onto exercises step 1 x 5 leading with Left to ascend and stepping back down with R LE-Instructed patient to trial this with her son to work toward utilizing steps-patient has 3 steps from her family room to the main part of her home and she has a stair  installed so she could return to her bed. Patient continues to need encouragement and reinforcement with all moiblity tasks.   CONTINUED NEED FOR THE FOLLOWING SKILLS: Patient will be seen 2 x week for Physical Therapy to continue to work toward goals within POC and HHPT is medically necessary to address  dx and clinical findings: decreased ROM, decreased strength, impaired gait, decreased ability w stair negotiation, increased swelling, decreased bed mobility, decreased transfer status, decreased endurance, decreased balance and decreased safety, increased pain in order to improve functional mobility/quality of life, decrease burden of care, reduce risk for re-hospitalization, work towards patient's personal goals of return to PLOF w decrease risk for falls. PLAN FOR NEXT VISIT: Gait/transfer training, strengthening exercises, stair mobility  THE FOLLOWING DISCHARGE PLANNING WAS DISCUSSED WITH THE PATIENT/CAREGIVER: This is visit number 6  out of  8  planned visits. Discussed at length with patient and son whether patient would benefit from outpatient therapy vs therapy at home.   Patient is very fearful of stepping up on step-feels parallel bars could benefit patient and mirror form outaptient therpay could provide visual feedback for patient  NEXT MD APPT: in 4 weeks with Surgeon

## 2022-07-15 NOTE — HOME HEALTH
SUBJECTIVE: Patient smiling and not wearing her nasal cannula-\"I just finished my lunch. \"  CAREGIVER INVOLVEMENT/ASSISTANCE NEEDED FOR: Son is caring for patient this week  Caregiver is out sick and private PT is on vacation. HOME HEALTH SUPPLIES BY TYPE AND QUANTITY ORDERED/DELIVERED THIS VISIT : None    PATIENT RESPONSE TO TREATMENT:  Good- \"Oxygen may have been off for a couple of hours\" as per son-was 86 %  then charisma to 96% in 24 sec  after donning nasal cannula on and at 3 L/min . No increase in pain reported following treatment. PATIENT LEVEL OF UNDERSTANDING OF EDUCATION PROVIDED: Patient and son educated with fall risk prevention,pain mgmt and able to teach back cryotherapy    ASSESSMENT OF PROGRESS TOWARD GOALS: Patient became anxious during gait and requested to sit-resp was at 30 -O2 SAT 96% she was on 3L/min O2 via nasal cannula-after 5 in rest patient was at 26 resp. Discussed discharge plan at length with patient and son. Gait-Sup with FWW-cues to increase B foot clearance by increasing R and L hip/knee flexion-patient has difficulty with this despite cues and instruction. Sit to stand Sup -son blocks L foot in case it might slide. CONTINUED NEED FOR THE FOLLOWING SKILLS: Patient will be seen 3 x week for Physical Therapy to continue to work toward goals within POC and HHPT is medically necessary to address  dx and clinical findings: decreased ROM, decreased strength, impaired gait, decreased ability w stair negotiation, increased swelling, decreased bed mobility, decreased transfer status, decreased endurance, decreased balance and decreased safety, increased pain in order to improve functional mobility/quality of life, decrease burden of care, reduce risk for re-hospitalization, work towards patient's personal goals of return to PLOF w decrease risk for falls.   PLAN FOR NEXT VISIT: Discharge assessment from Supervising physical therapist  THE FOLLOWING DISCHARGE PLANNING WAS DISCUSSED WITH THE PATIENT/CAREGIVER: This is visit number 7  out of  8  planned visits. Discussed discharge with patient and son. Patient's son finally decided that he will stick with the private duty PT she has seen for years for the next few weeks. Overall he feels she is more cooperative for her regular therapsit due to the length of time he has seen her and Son reports heis going to start to return  to her Chiropractor \"for her back\" and e-stim treatments-he reports Dr Levi Barone told him to stay away from her R hip with chiropractor treatments. Son reports he may eventually take her to outpatient physical therapy-patient agreed with her son's plan. Son reports she will not walk with him or do the exercises with him but caregiver and private duty Physical Therapist will return next week. Son reports he has no difficulty using the W/C to get patient to the car and in/out of the car.   NEXT MD APPT:Patient is to return to her primary care MD in 2 months, will return to Dr Levi Barone in b[place] 1 month

## 2022-07-16 ENCOUNTER — HOME CARE VISIT (OUTPATIENT)
Dept: SCHEDULING | Facility: HOME HEALTH | Age: 87
End: 2022-07-16
Payer: MEDICARE

## 2022-07-16 VITALS
SYSTOLIC BLOOD PRESSURE: 110 MMHG | RESPIRATION RATE: 18 BRPM | TEMPERATURE: 97.7 F | HEART RATE: 76 BPM | OXYGEN SATURATION: 100 % | DIASTOLIC BLOOD PRESSURE: 70 MMHG

## 2022-07-16 PROCEDURE — G0151 HHCP-SERV OF PT,EA 15 MIN: HCPCS

## 2022-07-16 PROCEDURE — 3331090001 HH PPS REVENUE CREDIT

## 2022-07-16 PROCEDURE — 3331090002 HH PPS REVENUE DEBIT

## 2022-07-16 NOTE — HOME HEALTH
SUBJECTIVE intermittent pain on R hip 1/10 that is managed by rest   CAREGIVER ASSISTANCE:petty Esquivel is able to assist as needed   MEDICATIONS REVIEWED AND UPDATED: no changes in medications at this time  WOUND R hip intact no dressing or drainage noted, healing well   BED MOBILITY independent upon dc from [facility]  patient was able to demo supervision with transfers with RW from Mod A using RW  during eval   GAIT TRAINING patient was able to demo supervision with gait using RW from needing Min A using RW during eval   STAIRS Patient is supervision with stair negotiation from not able during eval  THERAPEUTIC EXERCISES supervision with HEP for THR   BALANCE Tinetti 22/28 from 11/28   PATIENT/CAREGIVER EDUCATION THIS VISIT: fall prevention, safety, need for assistance as needed for transfers and gait  ASSESSMENT:patient provided with skilled PT intervention consisting of graded therapeutic exercises, gait training, balance training, safe transfers training, caregiver education and Home exercise program education. Patient at time of discharge was able to demonstrate independence with bed mobility, supervision with transfers and gait with RW. pt. also noted that she is doing HEP 1x a day.   EDUCATION PROVIDED to continue with HEP daily    PATIENT LEVEL OF UNDERSTANDING OF EDUCATION PROVIDED Patient verbalized understanding   PATIENT RESPONSE TO PROCEDURE PERFORMED Patient was able to demo independence with HEP   PLAN dc at this time due to max potential achieved   DISCHARGE PLANNING DISCUSSED: dc to self and family under MD supervision

## 2022-07-17 PROCEDURE — 3331090001 HH PPS REVENUE CREDIT

## 2022-07-17 PROCEDURE — 3331090002 HH PPS REVENUE DEBIT

## 2022-07-17 NOTE — CASE COMMUNICATION
Made a return call to DR Yo Maldonado, Patient's PCP, regarding O2 SAT levels. Upon arrival yesterday patient did not have any O2 on and O2 SAT was 86% with a resp of 16-she had been sitting and eating lunch. Son reported O2 had been off for several hours. Placed patient on 3 L/mi of O2 via nasal cannula and within less than 1 min she was at 96%-when requested we amb bola 40' patient's resp increased to 30 then after sitting for 5 min with c ues to take deep breaths slow to 26 at 96% then slowed further and patient was able to speak in a normal voice and eventually slowed to 18 resp. .  0n 7/12 visit son had O2  1 L/min -during amb O2 SAT dropped to 83% and raised O2 to 2L/min and took several minutes to rise to 94%-Son was reluctant to raise O2 to 3L/min because he is \"trying to wean her off 3 L/min. \"  She is able to slow her breathing rate with cues and rest.  She apears v jesus anxious with all mobility tasks.

## 2022-07-18 PROCEDURE — 3331090002 HH PPS REVENUE DEBIT

## 2022-07-18 PROCEDURE — 3331090001 HH PPS REVENUE CREDIT

## 2022-07-19 PROCEDURE — 3331090001 HH PPS REVENUE CREDIT

## 2022-07-19 PROCEDURE — 3331090002 HH PPS REVENUE DEBIT

## 2022-07-20 PROCEDURE — 3331090002 HH PPS REVENUE DEBIT

## 2022-07-20 PROCEDURE — 3331090001 HH PPS REVENUE CREDIT

## 2022-07-21 VITALS
DIASTOLIC BLOOD PRESSURE: 52 MMHG | OXYGEN SATURATION: 96 % | SYSTOLIC BLOOD PRESSURE: 100 MMHG | HEART RATE: 86 BPM | RESPIRATION RATE: 16 BRPM

## 2022-07-21 PROCEDURE — 3331090002 HH PPS REVENUE DEBIT

## 2022-07-21 PROCEDURE — 3331090001 HH PPS REVENUE CREDIT

## 2022-07-21 NOTE — CASE COMMUNICATION
Discussed discharge with patient and son. Patient's son finally decided that he will stick with the private duty PT she has seen for years for the next few weeks. Overall he feels she is more cooperative for her regular therapsit due to the length of time he has seen her and Son reports heis going to start taking her back to her Chiropractor for her back and e-stim treatments-he reports Dr Yina Blair told him to stay away from her R hip w ith chiropractor treatments. Son reports he may eventually take her to outpatient physical therapy-patient agreed with her son's plan. He reports she will not walk with him or do the exercises with him but caregiver and private duty Physical Therapist will return next week. Son reports he has no difficulty using the W/C to get pateient to the car and in/out of the car.

## 2022-07-22 PROCEDURE — 3331090002 HH PPS REVENUE DEBIT

## 2022-07-22 PROCEDURE — 3331090001 HH PPS REVENUE CREDIT

## 2022-07-23 PROCEDURE — 3331090001 HH PPS REVENUE CREDIT

## 2022-07-23 PROCEDURE — 3331090002 HH PPS REVENUE DEBIT

## 2022-07-24 PROCEDURE — 3331090002 HH PPS REVENUE DEBIT

## 2022-07-24 PROCEDURE — 3331090001 HH PPS REVENUE CREDIT

## 2022-07-25 PROCEDURE — 3331090001 HH PPS REVENUE CREDIT

## 2022-07-25 PROCEDURE — 3331090002 HH PPS REVENUE DEBIT

## 2022-07-26 PROCEDURE — 3331090001 HH PPS REVENUE CREDIT

## 2022-07-26 PROCEDURE — 3331090002 HH PPS REVENUE DEBIT

## 2022-07-27 PROCEDURE — 3331090002 HH PPS REVENUE DEBIT

## 2022-07-27 PROCEDURE — 3331090001 HH PPS REVENUE CREDIT

## 2022-07-28 PROCEDURE — 3331090001 HH PPS REVENUE CREDIT

## 2022-07-28 PROCEDURE — 3331090002 HH PPS REVENUE DEBIT

## 2022-07-29 PROCEDURE — 3331090002 HH PPS REVENUE DEBIT

## 2022-07-29 PROCEDURE — 3331090001 HH PPS REVENUE CREDIT

## 2023-02-03 DIAGNOSIS — M16.11 PRIMARY OSTEOARTHRITIS OF RIGHT HIP: Primary | ICD-10-CM

## 2023-02-05 DIAGNOSIS — M16.11 PRIMARY OSTEOARTHRITIS OF RIGHT HIP: Primary | ICD-10-CM

## 2023-02-06 DIAGNOSIS — M16.11 PRIMARY OSTEOARTHRITIS OF RIGHT HIP: Primary | ICD-10-CM

## (undated) DEVICE — SUT VCRL + 2-0 36IN CT1 UD --

## (undated) DEVICE — ROCKER SWITCH PENCIL HOLSTER: Brand: VALLEYLAB

## (undated) DEVICE — SOL INJ L R 1000ML BG --

## (undated) DEVICE — NEEDLE SPNL 20GA L3.5IN YEL HUB S STL REG WALL FIT STYL W/

## (undated) DEVICE — ZIP 16 SURGICAL SKIN CLOSURE DEVICE: Brand: ZIP 16 SURGICAL SKIN CLOSURE DEVICE

## (undated) DEVICE — Device

## (undated) DEVICE — BLADE ELECTRODE: Brand: EDGE

## (undated) DEVICE — PACK PROCEDURE SURG TOT HIP ANTR CARTER CUST

## (undated) DEVICE — HANDPIECE SET WITH HIGH FLOW TIP AND SUCTION TUBE: Brand: INTERPULSE

## (undated) DEVICE — SUT VCRL + 1 36IN CT1 VIO --

## (undated) DEVICE — BLADE SAW 1.27X13X90 MM FOR LG BNE

## (undated) DEVICE — BOWL AND CEMENT CARTRIDGE WITH BREAKAWAY FEMORAL NOZZLE: Brand: ACM

## (undated) DEVICE — DRESSING ALG W4XL8IN AG FOAM SUPERABSORBENT SIL ANTIMIC

## (undated) DEVICE — SOLUTION IV NACL 0.9% 100 ML FLX CONTAINER

## (undated) DEVICE — GRIPPER SURGICAL RETRACTOR DISP

## (undated) DEVICE — SOL IRRIGATION INJ NACL 0.9% 500ML BTL

## (undated) DEVICE — GARMENT,MEDLINE,DVT,INT,CALF,MED, GEN2: Brand: MEDLINE

## (undated) DEVICE — SOLUTION IV 100ML 0.9% SOD CHL DIL INJ